# Patient Record
Sex: MALE | Race: WHITE | NOT HISPANIC OR LATINO | Employment: OTHER | URBAN - METROPOLITAN AREA
[De-identification: names, ages, dates, MRNs, and addresses within clinical notes are randomized per-mention and may not be internally consistent; named-entity substitution may affect disease eponyms.]

---

## 2018-09-05 ENCOUNTER — APPOINTMENT (OUTPATIENT)
Dept: PREADMISSION TESTING | Facility: HOSPITAL | Age: 78
End: 2018-09-05

## 2018-09-05 VITALS
TEMPERATURE: 97.2 F | HEIGHT: 67 IN | HEART RATE: 77 BPM | DIASTOLIC BLOOD PRESSURE: 70 MMHG | OXYGEN SATURATION: 96 % | SYSTOLIC BLOOD PRESSURE: 103 MMHG | WEIGHT: 201 LBS | RESPIRATION RATE: 18 BRPM | BODY MASS INDEX: 31.55 KG/M2

## 2018-09-05 LAB
ANION GAP SERPL CALCULATED.3IONS-SCNC: 12.7 MMOL/L
BILIRUB UR QL STRIP: NEGATIVE
BUN BLD-MCNC: 16 MG/DL (ref 8–23)
BUN/CREAT SERPL: 16 (ref 7–25)
CALCIUM SPEC-SCNC: 9.7 MG/DL (ref 8.6–10.5)
CHLORIDE SERPL-SCNC: 102 MMOL/L (ref 98–107)
CLARITY UR: CLEAR
CO2 SERPL-SCNC: 22.3 MMOL/L (ref 22–29)
COLOR UR: YELLOW
CREAT BLD-MCNC: 1 MG/DL (ref 0.76–1.27)
DEPRECATED RDW RBC AUTO: 45.2 FL (ref 37–54)
ERYTHROCYTE [DISTWIDTH] IN BLOOD BY AUTOMATED COUNT: 12.9 % (ref 11.5–14.5)
GFR SERPL CREATININE-BSD FRML MDRD: 72 ML/MIN/1.73
GLUCOSE BLD-MCNC: 182 MG/DL (ref 65–99)
GLUCOSE UR STRIP-MCNC: ABNORMAL MG/DL
HCT VFR BLD AUTO: 44.3 % (ref 40.4–52.2)
HGB BLD-MCNC: 14.8 G/DL (ref 13.7–17.6)
HGB UR QL STRIP.AUTO: NEGATIVE
KETONES UR QL STRIP: NEGATIVE
LEUKOCYTE ESTERASE UR QL STRIP.AUTO: NEGATIVE
MCH RBC QN AUTO: 32.1 PG (ref 27–32.7)
MCHC RBC AUTO-ENTMCNC: 33.4 G/DL (ref 32.6–36.4)
MCV RBC AUTO: 96.1 FL (ref 79.8–96.2)
NITRITE UR QL STRIP: NEGATIVE
PH UR STRIP.AUTO: 5.5 [PH] (ref 5–8)
PLATELET # BLD AUTO: 99 10*3/MM3 (ref 140–500)
PMV BLD AUTO: 11.4 FL (ref 6–12)
POTASSIUM BLD-SCNC: 4.5 MMOL/L (ref 3.5–5.2)
PROT UR QL STRIP: NEGATIVE
RBC # BLD AUTO: 4.61 10*6/MM3 (ref 4.6–6)
SODIUM BLD-SCNC: 137 MMOL/L (ref 136–145)
SP GR UR STRIP: 1.02 (ref 1–1.03)
UROBILINOGEN UR QL STRIP: ABNORMAL
WBC NRBC COR # BLD: 5.12 10*3/MM3 (ref 4.5–10.7)

## 2018-09-05 PROCEDURE — 81003 URINALYSIS AUTO W/O SCOPE: CPT | Performed by: UROLOGY

## 2018-09-05 PROCEDURE — 36415 COLL VENOUS BLD VENIPUNCTURE: CPT

## 2018-09-05 PROCEDURE — 80048 BASIC METABOLIC PNL TOTAL CA: CPT | Performed by: UROLOGY

## 2018-09-05 PROCEDURE — 93005 ELECTROCARDIOGRAM TRACING: CPT

## 2018-09-05 PROCEDURE — 93010 ELECTROCARDIOGRAM REPORT: CPT | Performed by: INTERNAL MEDICINE

## 2018-09-05 PROCEDURE — 85027 COMPLETE CBC AUTOMATED: CPT | Performed by: UROLOGY

## 2018-09-05 RX ORDER — FLUOXETINE HYDROCHLORIDE 20 MG/1
20 CAPSULE ORAL DAILY
COMMUNITY
End: 2019-05-08

## 2018-09-05 RX ORDER — NAPROXEN SODIUM 220 MG
220 TABLET ORAL 2 TIMES DAILY PRN
COMMUNITY
End: 2021-09-28

## 2018-09-05 RX ORDER — FINASTERIDE 5 MG/1
5 TABLET, FILM COATED ORAL DAILY
Status: ON HOLD | COMMUNITY
End: 2018-09-10

## 2018-09-05 RX ORDER — TAMSULOSIN HYDROCHLORIDE 0.4 MG/1
1 CAPSULE ORAL NIGHTLY
Status: ON HOLD | COMMUNITY
End: 2018-09-10

## 2018-09-05 RX ORDER — LISINOPRIL 5 MG/1
5 TABLET ORAL DAILY
COMMUNITY
End: 2022-11-01 | Stop reason: HOSPADM

## 2018-09-05 RX ORDER — LANOLIN ALCOHOL/MO/W.PET/CERES
1000 CREAM (GRAM) TOPICAL DAILY
COMMUNITY
End: 2019-05-08

## 2018-09-05 NOTE — DISCHARGE INSTRUCTIONS
Take the following medications the morning of surgery with a small sip of water:  NONE    Arrive to hospital on your day of surgery at 8:00 AM.      General Instructions:  • Do not eat or drink anything after midnight the night before surgery.  • Infants may have breast milk up to four hours before surgery.  • Infants drinking formula may drink formula up to six hours before surgery.   • Patients who avoid smoking, chewing tobacco and alcohol for 4 weeks prior to surgery have a reduced risk of post-operative complications.  Quit smoking as many days before surgery as you can.  • Do not smoke, use chewing tobacco or drink alcohol the day of surgery.   • If applicable bring your C-PAP/ BI-PAP machine.  • Bring any papers given to you in the doctor’s office.  • Wear clean comfortable clothes and socks.  • Do not wear contact lenses or make-up.  Bring a case for your glasses.   • Bring crutches or walker if applicable.  • Remove all piercings.  Leave jewelry and any other valuables at home.  • Hair extensions with metal clips must be removed prior to surgery.  • The Pre-Admission Testing nurse will instruct you to bring medications if unable to obtain an accurate list in Pre-Admission Testing.        If you were given a blood bank ID arm band remember to bring it with you the day of surgery.    Preventing a Surgical Site Infection:  • For 2 to 3 days before surgery, avoid shaving with a razor because the razor can irritate skin and make it easier to develop an infection.    • Any areas of open skin can increase the risk of a post-operative wound infection by allowing bacteria to enter and travel throughout the body.  Notify your surgeon if you have any skin wounds / rashes even if it is not near the expected surgical site.  The area will need assessed to determine if surgery should be delayed until it is healed.  • The night prior to surgery sleep in a clean bed with clean clothing.  Do not allow pets to sleep with  you.  • Shower on the morning of surgery using a fresh bar of anti-bacterial soap (such as Dial) and clean washcloth.  Dry with a clean towel and dress in clean clothing.  • Ask your surgeon if you will be receiving antibiotics prior to surgery.  • Make sure you, your family, and all healthcare providers clean their hands with soap and water or an alcohol based hand  before caring for you or your wound.    Day of surgery:  Upon arrival, a Pre-op nurse and Anesthesiologist will review your health history, obtain vital signs, and answer questions you may have.  The only belongings needed at this time will be your home medications and if applicable your C-PAP/BI-PAP machine.  If you are staying overnight your family can leave the rest of your belongings in the car and bring them to your room later.  A Pre-op nurse will start an IV and you may receive medication in preparation for surgery, including something to help you relax.  Your family will be able to see you in the Pre-op area.  While you are in surgery your family should notify the waiting room  if they leave the waiting room area and provide a contact phone number.    Please be aware that surgery does come with discomfort.  We want to make every effort to control your discomfort so please discuss any uncontrolled symptoms with your nurse.   Your doctor will most likely have prescribed pain medications.      If you are going home after surgery you will receive individualized written care instructions before being discharged.  A responsible adult must drive you to and from the hospital on the day of your surgery and stay with you for 24 hours.    If you are staying overnight following surgery, you will be transported to your hospital room following the recovery period.  [facility] has all private rooms.    You have received a list of surgical assistants for your reference.  If you have any questions please call Pre-Admission  Testing at [phone].  Deductibles and co-payments are collected on the day of service. Please be prepared to pay the required co-pay, deductible or deposit on the day of service as defined by your plan.

## 2018-09-10 ENCOUNTER — ANESTHESIA EVENT (OUTPATIENT)
Dept: PERIOP | Facility: HOSPITAL | Age: 78
End: 2018-09-10

## 2018-09-10 ENCOUNTER — ANESTHESIA (OUTPATIENT)
Dept: PERIOP | Facility: HOSPITAL | Age: 78
End: 2018-09-10

## 2018-09-10 ENCOUNTER — HOSPITAL ENCOUNTER (OUTPATIENT)
Facility: HOSPITAL | Age: 78
Discharge: HOME | End: 2018-09-12
Attending: UROLOGY | Admitting: UROLOGY

## 2018-09-10 DIAGNOSIS — N13.8 BPH WITH OBSTRUCTION/LOWER URINARY TRACT SYMPTOMS: Primary | ICD-10-CM

## 2018-09-10 DIAGNOSIS — N32.0 BLADDER OUTLET OBSTRUCTION: ICD-10-CM

## 2018-09-10 DIAGNOSIS — E11.9 TYPE 2 DIABETES MELLITUS WITHOUT COMPLICATION, WITH LONG-TERM CURRENT USE OF INSULIN (HCC): ICD-10-CM

## 2018-09-10 DIAGNOSIS — Z79.4 TYPE 2 DIABETES MELLITUS WITHOUT COMPLICATION, WITH LONG-TERM CURRENT USE OF INSULIN (HCC): ICD-10-CM

## 2018-09-10 DIAGNOSIS — N40.1 BPH WITH OBSTRUCTION/LOWER URINARY TRACT SYMPTOMS: Primary | ICD-10-CM

## 2018-09-10 DIAGNOSIS — I10 ESSENTIAL HYPERTENSION: ICD-10-CM

## 2018-09-10 PROBLEM — D69.6 THROMBOCYTOPENIA (HCC): Status: ACTIVE | Noted: 2018-09-10

## 2018-09-10 PROBLEM — E53.8 B12 DEFICIENCY: Status: ACTIVE | Noted: 2018-09-10

## 2018-09-10 LAB
GLUCOSE BLDC GLUCOMTR-MCNC: 130 MG/DL (ref 70–130)
GLUCOSE BLDC GLUCOMTR-MCNC: 161 MG/DL (ref 70–130)
GLUCOSE BLDC GLUCOMTR-MCNC: 236 MG/DL (ref 70–130)

## 2018-09-10 PROCEDURE — 25010000002 PROPOFOL 10 MG/ML EMULSION: Performed by: ANESTHESIOLOGY

## 2018-09-10 PROCEDURE — A9270 NON-COVERED ITEM OR SERVICE: HCPCS | Performed by: INTERNAL MEDICINE

## 2018-09-10 PROCEDURE — A9270 NON-COVERED ITEM OR SERVICE: HCPCS | Performed by: UROLOGY

## 2018-09-10 PROCEDURE — G0378 HOSPITAL OBSERVATION PER HR: HCPCS

## 2018-09-10 PROCEDURE — 25010000003 CEFAZOLIN IN DEXTROSE 2-4 GM/100ML-% SOLUTION: Performed by: UROLOGY

## 2018-09-10 PROCEDURE — 25010000002 ONDANSETRON PER 1 MG: Performed by: ANESTHESIOLOGY

## 2018-09-10 PROCEDURE — 88305 TISSUE EXAM BY PATHOLOGIST: CPT | Performed by: UROLOGY

## 2018-09-10 PROCEDURE — 63710000001 BELLADONNA-OPIUM 16.2-30 MG SUPPOSITORY: Performed by: UROLOGY

## 2018-09-10 PROCEDURE — 63710000001 INSULIN ASPART PER 5 UNITS: Performed by: INTERNAL MEDICINE

## 2018-09-10 PROCEDURE — 25010000002 MIDAZOLAM PER 1 MG: Performed by: ANESTHESIOLOGY

## 2018-09-10 PROCEDURE — 25010000002 FENTANYL CITRATE (PF) 100 MCG/2ML SOLUTION: Performed by: ANESTHESIOLOGY

## 2018-09-10 PROCEDURE — 63710000001 SENNOSIDES-DOCUSATE SODIUM 8.6-50 MG TABLET: Performed by: UROLOGY

## 2018-09-10 PROCEDURE — 82962 GLUCOSE BLOOD TEST: CPT

## 2018-09-10 RX ORDER — ONDANSETRON 4 MG/1
4 TABLET, ORALLY DISINTEGRATING ORAL EVERY 6 HOURS PRN
Status: DISCONTINUED | OUTPATIENT
Start: 2018-09-10 | End: 2018-09-12 | Stop reason: HOSPADM

## 2018-09-10 RX ORDER — CEFAZOLIN SODIUM 2 G/100ML
2 INJECTION, SOLUTION INTRAVENOUS ONCE
Status: COMPLETED | OUTPATIENT
Start: 2018-09-10 | End: 2018-09-10

## 2018-09-10 RX ORDER — FENTANYL CITRATE 50 UG/ML
INJECTION, SOLUTION INTRAMUSCULAR; INTRAVENOUS AS NEEDED
Status: DISCONTINUED | OUTPATIENT
Start: 2018-09-10 | End: 2018-09-10 | Stop reason: SURG

## 2018-09-10 RX ORDER — NALOXONE HCL 0.4 MG/ML
0.2 VIAL (ML) INJECTION AS NEEDED
Status: DISCONTINUED | OUTPATIENT
Start: 2018-09-10 | End: 2018-09-10 | Stop reason: HOSPADM

## 2018-09-10 RX ORDER — FLUMAZENIL 0.1 MG/ML
0.2 INJECTION INTRAVENOUS AS NEEDED
Status: DISCONTINUED | OUTPATIENT
Start: 2018-09-10 | End: 2018-09-10 | Stop reason: HOSPADM

## 2018-09-10 RX ORDER — OXYCODONE AND ACETAMINOPHEN 7.5; 325 MG/1; MG/1
1 TABLET ORAL ONCE AS NEEDED
Status: DISCONTINUED | OUTPATIENT
Start: 2018-09-10 | End: 2018-09-10 | Stop reason: HOSPADM

## 2018-09-10 RX ORDER — ATROPA BELLADONNA AND OPIUM 16.2; 6 MG/1; MG/1
30 SUPPOSITORY RECTAL DAILY PRN
Status: CANCELLED | OUTPATIENT
Start: 2018-09-10 | End: 2018-09-20

## 2018-09-10 RX ORDER — MIDAZOLAM HYDROCHLORIDE 1 MG/ML
1 INJECTION INTRAMUSCULAR; INTRAVENOUS
Status: DISCONTINUED | OUTPATIENT
Start: 2018-09-10 | End: 2018-09-10 | Stop reason: HOSPADM

## 2018-09-10 RX ORDER — SODIUM CHLORIDE 9 MG/ML
75 INJECTION, SOLUTION INTRAVENOUS CONTINUOUS
Status: DISCONTINUED | OUTPATIENT
Start: 2018-09-10 | End: 2018-09-12 | Stop reason: HOSPADM

## 2018-09-10 RX ORDER — PROPOFOL 10 MG/ML
VIAL (ML) INTRAVENOUS AS NEEDED
Status: DISCONTINUED | OUTPATIENT
Start: 2018-09-10 | End: 2018-09-10 | Stop reason: SURG

## 2018-09-10 RX ORDER — PROMETHAZINE HYDROCHLORIDE 25 MG/1
12.5 TABLET ORAL ONCE AS NEEDED
Status: DISCONTINUED | OUTPATIENT
Start: 2018-09-10 | End: 2018-09-10 | Stop reason: HOSPADM

## 2018-09-10 RX ORDER — ONDANSETRON 2 MG/ML
4 INJECTION INTRAMUSCULAR; INTRAVENOUS ONCE AS NEEDED
Status: DISCONTINUED | OUTPATIENT
Start: 2018-09-10 | End: 2018-09-10 | Stop reason: HOSPADM

## 2018-09-10 RX ORDER — ONDANSETRON 2 MG/ML
4 INJECTION INTRAMUSCULAR; INTRAVENOUS EVERY 6 HOURS PRN
Status: DISCONTINUED | OUTPATIENT
Start: 2018-09-10 | End: 2018-09-12 | Stop reason: HOSPADM

## 2018-09-10 RX ORDER — EPHEDRINE SULFATE 50 MG/ML
5 INJECTION, SOLUTION INTRAVENOUS ONCE AS NEEDED
Status: DISCONTINUED | OUTPATIENT
Start: 2018-09-10 | End: 2018-09-10 | Stop reason: HOSPADM

## 2018-09-10 RX ORDER — SODIUM CHLORIDE 0.9 % (FLUSH) 0.9 %
1-10 SYRINGE (ML) INJECTION AS NEEDED
Status: DISCONTINUED | OUTPATIENT
Start: 2018-09-10 | End: 2018-09-10 | Stop reason: HOSPADM

## 2018-09-10 RX ORDER — DIPHENHYDRAMINE HYDROCHLORIDE 50 MG/ML
12.5 INJECTION INTRAMUSCULAR; INTRAVENOUS
Status: DISCONTINUED | OUTPATIENT
Start: 2018-09-10 | End: 2018-09-10 | Stop reason: HOSPADM

## 2018-09-10 RX ORDER — DEXTROSE MONOHYDRATE 25 G/50ML
25 INJECTION, SOLUTION INTRAVENOUS
Status: DISCONTINUED | OUTPATIENT
Start: 2018-09-10 | End: 2018-09-12 | Stop reason: HOSPADM

## 2018-09-10 RX ORDER — LABETALOL HYDROCHLORIDE 5 MG/ML
5 INJECTION, SOLUTION INTRAVENOUS
Status: DISCONTINUED | OUTPATIENT
Start: 2018-09-10 | End: 2018-09-10 | Stop reason: HOSPADM

## 2018-09-10 RX ORDER — ONDANSETRON 4 MG/1
4 TABLET, FILM COATED ORAL EVERY 6 HOURS PRN
Status: DISCONTINUED | OUTPATIENT
Start: 2018-09-10 | End: 2018-09-12 | Stop reason: HOSPADM

## 2018-09-10 RX ORDER — HYDROCODONE BITARTRATE AND ACETAMINOPHEN 7.5; 325 MG/1; MG/1
1 TABLET ORAL ONCE AS NEEDED
Status: DISCONTINUED | OUTPATIENT
Start: 2018-09-10 | End: 2018-09-10 | Stop reason: HOSPADM

## 2018-09-10 RX ORDER — MIDAZOLAM HYDROCHLORIDE 1 MG/ML
2 INJECTION INTRAMUSCULAR; INTRAVENOUS
Status: DISCONTINUED | OUTPATIENT
Start: 2018-09-10 | End: 2018-09-10 | Stop reason: HOSPADM

## 2018-09-10 RX ORDER — PROMETHAZINE HYDROCHLORIDE 25 MG/ML
12.5 INJECTION, SOLUTION INTRAMUSCULAR; INTRAVENOUS ONCE AS NEEDED
Status: DISCONTINUED | OUTPATIENT
Start: 2018-09-10 | End: 2018-09-10 | Stop reason: HOSPADM

## 2018-09-10 RX ORDER — OXYCODONE AND ACETAMINOPHEN 7.5; 325 MG/1; MG/1
2 TABLET ORAL EVERY 4 HOURS PRN
Status: CANCELLED | OUTPATIENT
Start: 2018-09-10 | End: 2018-09-20

## 2018-09-10 RX ORDER — FAMOTIDINE 10 MG/ML
20 INJECTION, SOLUTION INTRAVENOUS ONCE
Status: COMPLETED | OUTPATIENT
Start: 2018-09-10 | End: 2018-09-10

## 2018-09-10 RX ORDER — MAGNESIUM HYDROXIDE 1200 MG/15ML
LIQUID ORAL AS NEEDED
Status: DISCONTINUED | OUTPATIENT
Start: 2018-09-10 | End: 2018-09-10 | Stop reason: HOSPADM

## 2018-09-10 RX ORDER — SENNA AND DOCUSATE SODIUM 50; 8.6 MG/1; MG/1
2 TABLET, FILM COATED ORAL NIGHTLY
Status: DISCONTINUED | OUTPATIENT
Start: 2018-09-10 | End: 2018-09-12 | Stop reason: HOSPADM

## 2018-09-10 RX ORDER — FENTANYL CITRATE 50 UG/ML
50 INJECTION, SOLUTION INTRAMUSCULAR; INTRAVENOUS
Status: DISCONTINUED | OUTPATIENT
Start: 2018-09-10 | End: 2018-09-10 | Stop reason: HOSPADM

## 2018-09-10 RX ORDER — LISINOPRIL 5 MG/1
5 TABLET ORAL DAILY
Status: DISCONTINUED | OUTPATIENT
Start: 2018-09-10 | End: 2018-09-10

## 2018-09-10 RX ORDER — SODIUM CHLORIDE, SODIUM LACTATE, POTASSIUM CHLORIDE, CALCIUM CHLORIDE 600; 310; 30; 20 MG/100ML; MG/100ML; MG/100ML; MG/100ML
9 INJECTION, SOLUTION INTRAVENOUS CONTINUOUS
Status: DISCONTINUED | OUTPATIENT
Start: 2018-09-10 | End: 2018-09-10

## 2018-09-10 RX ORDER — PROMETHAZINE HYDROCHLORIDE 25 MG/1
25 TABLET ORAL ONCE AS NEEDED
Status: DISCONTINUED | OUTPATIENT
Start: 2018-09-10 | End: 2018-09-10 | Stop reason: HOSPADM

## 2018-09-10 RX ORDER — CEFAZOLIN SODIUM 2 G/100ML
2 INJECTION, SOLUTION INTRAVENOUS EVERY 8 HOURS
Status: COMPLETED | OUTPATIENT
Start: 2018-09-10 | End: 2018-09-11

## 2018-09-10 RX ORDER — LIDOCAINE HYDROCHLORIDE 20 MG/ML
INJECTION, SOLUTION INFILTRATION; PERINEURAL AS NEEDED
Status: DISCONTINUED | OUTPATIENT
Start: 2018-09-10 | End: 2018-09-10 | Stop reason: SURG

## 2018-09-10 RX ORDER — NICOTINE POLACRILEX 4 MG
15 LOZENGE BUCCAL
Status: DISCONTINUED | OUTPATIENT
Start: 2018-09-10 | End: 2018-09-12 | Stop reason: HOSPADM

## 2018-09-10 RX ORDER — PROMETHAZINE HYDROCHLORIDE 25 MG/1
25 SUPPOSITORY RECTAL ONCE AS NEEDED
Status: DISCONTINUED | OUTPATIENT
Start: 2018-09-10 | End: 2018-09-10 | Stop reason: HOSPADM

## 2018-09-10 RX ORDER — ONDANSETRON 2 MG/ML
INJECTION INTRAMUSCULAR; INTRAVENOUS AS NEEDED
Status: DISCONTINUED | OUTPATIENT
Start: 2018-09-10 | End: 2018-09-10 | Stop reason: SURG

## 2018-09-10 RX ORDER — FLUOXETINE HYDROCHLORIDE 20 MG/1
20 CAPSULE ORAL DAILY
Status: DISCONTINUED | OUTPATIENT
Start: 2018-09-10 | End: 2018-09-12 | Stop reason: HOSPADM

## 2018-09-10 RX ADMIN — INSULIN ASPART 4 UNITS: 100 INJECTION, SOLUTION INTRAVENOUS; SUBCUTANEOUS at 22:07

## 2018-09-10 RX ADMIN — SODIUM CHLORIDE 75 ML/HR: 9 INJECTION, SOLUTION INTRAVENOUS at 17:46

## 2018-09-10 RX ADMIN — FENTANYL CITRATE 25 MCG: 50 INJECTION INTRAMUSCULAR; INTRAVENOUS at 12:06

## 2018-09-10 RX ADMIN — EPHEDRINE SULFATE 10 MG: 50 INJECTION INTRAMUSCULAR; INTRAVENOUS; SUBCUTANEOUS at 12:27

## 2018-09-10 RX ADMIN — CEFAZOLIN SODIUM 2 G: 2 INJECTION, SOLUTION INTRAVENOUS at 22:07

## 2018-09-10 RX ADMIN — CEFAZOLIN SODIUM 2 G: 2 INJECTION, SOLUTION INTRAVENOUS at 12:09

## 2018-09-10 RX ADMIN — DOCUSATE SODIUM -SENNOSIDES 2 TABLET: 50; 8.6 TABLET, COATED ORAL at 22:07

## 2018-09-10 RX ADMIN — FENTANYL CITRATE 25 MCG: 50 INJECTION INTRAMUSCULAR; INTRAVENOUS at 12:32

## 2018-09-10 RX ADMIN — PROPOFOL 80 MG: 10 INJECTION, EMULSION INTRAVENOUS at 12:06

## 2018-09-10 RX ADMIN — ONDANSETRON 4 MG: 2 INJECTION INTRAMUSCULAR; INTRAVENOUS at 12:06

## 2018-09-10 RX ADMIN — FENTANYL CITRATE 25 MCG: 50 INJECTION INTRAMUSCULAR; INTRAVENOUS at 12:05

## 2018-09-10 RX ADMIN — FAMOTIDINE 20 MG: 10 INJECTION, SOLUTION INTRAVENOUS at 08:58

## 2018-09-10 RX ADMIN — EPHEDRINE SULFATE 10 MG: 50 INJECTION INTRAMUSCULAR; INTRAVENOUS; SUBCUTANEOUS at 12:29

## 2018-09-10 RX ADMIN — SODIUM CHLORIDE, POTASSIUM CHLORIDE, SODIUM LACTATE AND CALCIUM CHLORIDE 9 ML/HR: 600; 310; 30; 20 INJECTION, SOLUTION INTRAVENOUS at 08:57

## 2018-09-10 RX ADMIN — ATROPA BELLADONNA AND OPIUM 30 MG: 16.2; 3 SUPPOSITORY RECTAL at 22:07

## 2018-09-10 RX ADMIN — FENTANYL CITRATE 25 MCG: 50 INJECTION INTRAMUSCULAR; INTRAVENOUS at 12:34

## 2018-09-10 RX ADMIN — MIDAZOLAM HYDROCHLORIDE 0.5 MG: 2 INJECTION, SOLUTION INTRAMUSCULAR; INTRAVENOUS at 08:57

## 2018-09-10 RX ADMIN — SODIUM CHLORIDE 75 ML/HR: 9 INJECTION, SOLUTION INTRAVENOUS at 22:17

## 2018-09-10 RX ADMIN — LIDOCAINE HYDROCHLORIDE 40 MG: 20 INJECTION, SOLUTION INFILTRATION; PERINEURAL at 12:06

## 2018-09-10 NOTE — ANESTHESIA POSTPROCEDURE EVALUATION
"Patient: Teto Castle    Procedure Summary     Date:  09/10/18 Room / Location:  [facility] OR 01 / [facility]    Anesthesia Start:  1202 Anesthesia Stop:  1257    Procedure:  TRANSURETHRAL RESECTION OF PROSTATE (N/A ) Diagnosis:       BPH with obstruction/lower urinary tract symptoms      (BPH with GIRON)    Surgeon:  Art Dickerson MD Provider:  Riley Fair MD    Anesthesia Type:  general ASA Status:  3          Anesthesia Type: general  Last vitals  BP   119/78 (09/10/18 1315)   Temp   36.7 °C (98.1 °F) (09/10/18 1255)   Pulse   77 (09/10/18 1315)   Resp   12 (09/10/18 1315)     SpO2   95 % (09/10/18 1315)     Post Anesthesia Care and Evaluation    Patient location during evaluation: PACU  Patient participation: complete - patient participated  Level of consciousness: awake and alert  Pain score: 0  Pain management: adequate  Airway patency: patent  Anesthetic complications: No anesthetic complications    Cardiovascular status: acceptable  Respiratory status: acceptable  Hydration status: acceptable    Comments: /78   Pulse 77   Temp 36.7 °C (98.1 °F) (Oral)   Resp 12   Ht 170.2 cm (67.01\")   Wt 90.8 kg (200 lb 3 oz)   SpO2 95%   BMI 31.35 kg/m²       "

## 2018-09-10 NOTE — OP NOTE
CYSTOSCOPY TRANSURETHRAL RESECTION OF PROSTATE  Procedure Note    Teto Castle  9/10/2018    Pre-op Diagnosis:   BPH with GIRON    Post-op Diagnosis:     Post-Op Diagnosis Codes:     * BPH with obstruction/lower urinary tract symptoms [N40.1, N13.8]    Procedure(s):  TRANSURETHRAL RESECTION OF PROSTATE    Surgeon(s):  Art Dickerson MD    Anesthesia: General    Staff:   Circulator: Jana Conner RN; Marilee Shin RN  Scrub Person: Jocy Jerez; Chrissy León    Estimated Blood Loss: 100ml    Specimens:                  Order Name Source Comment Collection Info Order Time   TISSUE PATHOLOGY EXAM Prostate  Collected By: Art Dickerson MD 9/10/2018 12:43 PM         Drains:   Urethral Catheter Other (Comment) 24 Fr. (Active)       Findings: trilobar hyperplasia with small median lobe, nl orifices    Complications: none    Indications: 77yo CM with GIRON failing meds and having recurrent UTIs.    Procedure: Patient was taken to operative suite and given general anesthesia. Placed in lithotomy and prepped and draped. Resectoscope placed. Trilobar disease noted. Orifices were normal and away from bladder neck. Prostate resected with A[facility] Gyrus scoep in a [place] fashion. Hemostasis achieved and chips irrigate clear. Prostate now side open. Honey 24f 3way placed to continuous irrigation.      Art Dickerson MD     Date: 9/10/2018  Time: 1:02 PM

## 2018-09-10 NOTE — CONSULTS
Patient Name:  Teto Castle  YOB: 1940  MRN:  [id number]  Admit Date:  9/10/2018  Patient Care Team:  Cam Dickey MD as PCP - General (Family Medicine)    Referring Provider: Dr. Dickerson  Reason for Consult: Diabetes Management    Subjective   Mr. Castle is a 78 y.o. male with a history of BPH, HTN, and Type 2 DM admitted to [facility] by the urology service for a TURP, which was done earlier today without issues.  He has no complaints at this time.  He denies nausea, vomiting, and shortness of breath.  His pain is well-controlled.  I was consulted for management of his diabetes.  He takes Tresiba insulin, metformin, and Januvia at home.  He states that for at least the past month his BG levels have been from about 115-140.  He denies hypoglycemic episodes.  He does not recall his A1C.  He additionally takes lisinopril to control his blood pressure and he has not had issues with this.        History of Present Illness    Past Medical History:   Diagnosis Date   • Anxiety and depression    • Arthritis    • BPH (benign prostatic hyperplasia)    • Diabetes mellitus (CMS/HCC)    • Hard of hearing    • History of frequent urinary tract infections    • History of MI (myocardial infarction)     STATES WAS TOLD HE HAD IN PAST, NEVER SAW CARDIOLOGY   • Hypertension      Past Surgical History:   Procedure Laterality Date   • CATARACT EXTRACTION, BILATERAL     • FRACTURE SURGERY      LT ARM   • HEMORRHOIDECTOMY       Family History   Problem Relation Age of Onset   • Malig Hyperthermia Neg Hx      Social History   Substance Use Topics   • Smoking status: Former Smoker     Types: Cigars   • Smokeless tobacco: Current User     Types: Chew      Comment: 25 YEARS AGO   • Alcohol use No     Prescriptions Prior to Admission   Medication Sig Dispense Refill Last Dose   • Cariprazine HCl (VRAYLAR) 1.5 MG capsule capsule Take 1.5 mg by mouth Every Evening.   9/9/2018 at 2000   • FLUoxetine  (PROzac) 20 MG capsule Take 20 mg by mouth Daily.   9/9/2018 at 2000   • Insulin Degludec (TRESIBA FLEXTOUCH SC) Inject 12-15 Units under the skin into the appropriate area as directed Daily.   9/9/2018 at 1000   • lisinopril (PRINIVIL,ZESTRIL) 5 MG tablet Take 5 mg by mouth Daily.   9/10/2018 at 0530   • metFORMIN (GLUCOPHAGE) 500 MG tablet Take 500 mg by mouth 2 (Two) Times a Day With Meals.   9/10/2018 at 0530   • Multiple Vitamins-Minerals (MULTIVITAMIN ADULT PO) Take 1 tablet by mouth Every Evening.   2 WEEKS   • naproxen sodium (ALEVE) 220 MG tablet Take 220 mg by mouth 2 (Two) Times a Day As Needed. PT TO HOLD FOR SURGERY   2 WEEKS   • SITagliptin (JANUVIA) 100 MG tablet Take 100 mg by mouth Daily.   9/10/2018 at 0530   • vitamin B-12 (CYANOCOBALAMIN) 1000 MCG tablet Take 1,000 mcg by mouth Daily.   2 WEEKS at Unknown time   • VITAMIN E PO Take 1 tablet by mouth Every Other Day. PT TO HOLD FOR SURGERY   2 WEEKS     Allergies:    Allergies   Allergen Reactions   • Morphine Itching and Confusion       Review of Systems   Constitutional: Negative for chills and fever.   HENT: Negative for congestion, nosebleeds, sore throat and trouble swallowing.    Eyes: Negative for redness and visual disturbance.   Respiratory: Negative for cough, choking, chest tightness and shortness of breath.    Cardiovascular: Negative for chest pain, palpitations and leg swelling.   Gastrointestinal: Negative for abdominal pain, blood in stool, constipation, diarrhea, nausea and vomiting.   Endocrine: Negative for cold intolerance and heat intolerance.   Genitourinary: Positive for difficulty urinating and hematuria.   Musculoskeletal: Negative for arthralgias and myalgias.   Skin: Negative for pallor and rash.   Neurological: Negative for dizziness, weakness, light-headedness and headaches.   Hematological: Negative for adenopathy. Does not bruise/bleed easily.   Psychiatric/Behavioral: Negative for confusion and decreased  concentration.        Objective    Vital Signs  Temp:  [97.2 °F (36.2 °C)-98.3 °F (36.8 °C)] 97.2 °F (36.2 °C)  Heart Rate:  [64-91] 82  Resp:  [12-18] 18  BP: (100-135)/() 115/73  SpO2:  [94 %-99 %] 95 %  on  Flow (L/min):  [4] 4;   Device (Oxygen Therapy): room air  Body mass index is 31.35 kg/m².    Physical Exam   Constitutional: He is oriented to person, place, and time. No distress.   HENT:   Head: Normocephalic and atraumatic.   Mouth/Throat: Oropharynx is clear and moist.   Eyes: Pupils are equal, round, and reactive to light. Conjunctivae and EOM are normal.   Neck: Normal range of motion. Neck supple.   Cardiovascular: Normal rate, regular rhythm and intact distal pulses.    Pulmonary/Chest: Effort normal and breath sounds normal. He has no rales.   Abdominal: Soft. Bowel sounds are normal.   Genitourinary:   Genitourinary Comments: Yip catheter with irrigation in place draining punch-colored   Musculoskeletal: He exhibits no edema or tenderness.   Neurological: He is alert and oriented to person, place, and time. He has normal strength. No cranial nerve deficit.   Skin: Skin is warm and dry. He is not diaphoretic.   Psychiatric: He has a normal mood and affect. His behavior is normal.   Nursing note and vitals reviewed.      Results Review:  I reviewed the patient's new clinical results.  I reviewed the patient's other test results and agree with the interpretation  I personally viewed and interpreted the patient's EKG/Telemetry data (from 9/5/18)    Lab Results (last 24 hours)     Procedure Component Value Units Date/Time    POC Glucose Once [[id number]]  (Normal) Collected:  09/10/18 0815    Specimen:  Blood Updated:  09/10/18 0816     Glucose 130 mg/dL     Narrative:       Meter: [id number] : [id number] Eduin GOMEZ    Tissue Pathology Exam [[id number]] Collected:  09/10/18 1227    Specimen:  Tissue from Prostate Updated:  09/10/18 1406    POC Glucose Once [[id number]]  (Abnormal)  Collected:  09/10/18 1724    Specimen:  Blood Updated:  09/10/18 1730     Glucose 161 (H) mg/dL     Narrative:       Meter: [id number] : [id number] Conrad GOMEZ          No orders to display     Assessment/Plan   Active Hospital Problems    Diagnosis Date Noted   • **BPH with obstruction/lower urinary tract symptoms [N40.1, N13.8] 09/10/2018   • Essential hypertension [I10] 09/10/2018   • Type 2 diabetes mellitus without complication (CMS/[facility]) [E11.9] 09/10/2018   • Thrombocytopenia (CMS/[facility]) [D69.6] 09/10/2018   • B12 deficiency [E53.8] 09/10/2018      Resolved Hospital Problems    Diagnosis Date Noted Date Resolved   No resolved problems to display.   BPH with Obstruction/LUTS  - s/p TURP on 9/10/18  - management per primary    Type 2 DM  - BG acceptable  - on 12 units of Tresiba QAM at home-will order 10 units of levemir for tomorrow morning  - cover with ssi  - hold metformin  - can restart januvia (substitute Tradjenta per formulary)    Hypertension  - BP is on the low end of normal  - hold lisinopril for now to avoid hypotension  - BMP in AM    Thrombocytopenia  - mild, patient states he was unaware of this diagnosis and no recent labs in our system  - CBC ordered for the AM, will follow    B12 Deficiency  - resume home dose of B12 at discharge    DVT Prophylaxis  - SCDs    Thank you very much for this consult.  [facility] will continue to follow the patient with you.    I discussed the patients findings and my recommendations with patient and nursing staff.      Jonas Antonio MD  [facility]  09/10/18  7:13 PM

## 2018-09-10 NOTE — H&P
First Urology History and Physical    Patient Care Team:  Cam Dickey MD as PCP - General (Family Medicine)    Chief complaint bladder outlet obstruction    Subjective     Patient is a 78 y.o. male presents with severe progressive LUTS and recurrent UTIs. He is already maximized on medications with flomax and proscar. Onset of symptoms was gradual starting several years ago.   Associated symptoms include obstructive and irritative LUTS.      Review of Systems   Pertinent items are noted in HPI    Past Medical History:   Diagnosis Date   • Anxiety and depression    • Arthritis    • BPH (benign prostatic hyperplasia)    • Diabetes mellitus (CMS/HCC)    • Hard of hearing    • History of frequent urinary tract infections    • History of MI (myocardial infarction)     STATES WAS TOLD HE HAD IN PAST, NEVER SAW CARDIOLOGY   • Hypertension      Past Surgical History:   Procedure Laterality Date   • CATARACT EXTRACTION, BILATERAL     • FRACTURE SURGERY      LT ARM   • HEMORRHOIDECTOMY       Family History   Problem Relation Age of Onset   • Malig Hyperthermia Neg Hx      Social History   Substance Use Topics   • Smoking status: Former Smoker     Types: Cigars   • Smokeless tobacco: Current User     Types: Chew      Comment: 25 YEARS AGO   • Alcohol use No     Prescriptions Prior to Admission   Medication Sig Dispense Refill Last Dose   • Cariprazine HCl (VRAYLAR) 1.5 MG capsule capsule Take 1.5 mg by mouth Every Evening.      • finasteride (PROSCAR) 5 MG tablet Take 5 mg by mouth Daily.      • FLUoxetine (PROzac) 20 MG capsule Take 20 mg by mouth Daily.      • Insulin Degludec (TRESIBA FLEXTOUCH SC) Inject 12-15 Units under the skin into the appropriate area as directed Daily.      • lisinopril (PRINIVIL,ZESTRIL) 5 MG tablet Take 5 mg by mouth Daily.      • metFORMIN (GLUCOPHAGE) 500 MG tablet Take 500 mg by mouth 2 (Two) Times a Day With Meals.      • Multiple Vitamins-Minerals (MULTIVITAMIN ADULT PO) Take 1  "tablet by mouth Every Evening.      • naproxen sodium (ALEVE) 220 MG tablet Take 220 mg by mouth 2 (Two) Times a Day As Needed. PT TO HOLD FOR SURGERY      • SITagliptin (JANUVIA) 100 MG tablet Take 100 mg by mouth Daily.      • tamsulosin (FLOMAX) 0.4 MG capsule 24 hr capsule Take 1 capsule by mouth Every Night.      • vitamin B-12 (CYANOCOBALAMIN) 1000 MCG tablet Take 1,000 mcg by mouth Daily.      • VITAMIN E PO Take 1 tablet by mouth Every Other Day. PT TO HOLD FOR SURGERY        Allergies:  Morphine    Objective     Vital Signs  Temp:  [97.6 °F (36.4 °C)] 97.6 °F (36.4 °C)  Heart Rate:  [64] 64  Resp:  [18] 18  BP: (122)/(79) 122/79  No intake or output data in the 24 hours ending 09/10/18 0849  Flowsheet Rows      First Filed Value   Admission Height  170.2 cm (67.01\") Documented at 09/10/2018 0838   Admission Weight  90.8 kg (200 lb 3 oz) Documented at 09/10/2018 0838           Physical Exam:      General Appearance:    Alert, cooperative, in no acute distress   Head:    Normocephalic, without obvious abnormality, atraumatic   Eyes:            Lids and lashes normal, conjunctivae and sclerae normal, no   icterus, no pallor, corneas clear, PERRLA   Ears:    Ears appear intact with no abnormalities noted   Throat:   No oral lesions, no thrush, oral mucosa moist   Neck:   No adenopathy, supple, trachea midline, no thyromegaly, no   carotid bruit, no JVD   Back:     No kyphosis present, no scoliosis present, no skin lesions,      erythema or scars, no tenderness to percussion or                   palpation,   range of motion normal   Lungs:     Clear to auscultation,respirations regular, even and                  unlabored    Heart:    Regular rhythm and normal rate, normal S1 and S2, no            murmur, no gallop, no rub, no click   Chest Wall:    No abnormalities observed   Abdomen:     Normal bowel sounds, no masses, no organomegaly, soft        non-tender, non-distended, no guarding, no rebound              "   tenderness   Rectal:     Deferred   Extremities:   Moves all extremities well, no edema, no cyanosis, no             redness   Pulses:   Pulses palpable and equal bilaterally   Skin:   No bleeding, bruising or rash   Lymph nodes:   No palpable adenopathy   Neurologic:   Cranial nerves 2 - 12 grossly intact, sensation intact, DTR       present and equal bilaterally     Results Review:    I reviewed the patient's new clinical results.  Results for orders placed or performed during the hospital encounter of 09/10/18   POC Glucose Once   Result Value Ref Range    Glucose 130 70 - 130 mg/dL        Assessment/Plan:  Assessment/Plan     Active Problems:    BPH with obstruction/lower urinary tract symptoms      Plan- TURP    I discussed the patients findings and my recommendations with patient.     Art Dickerson MD  09/10/18  8:49 AM

## 2018-09-10 NOTE — ANESTHESIA PROCEDURE NOTES
Airway  Airway not difficult    General Information and Staff    Anesthesiologist: JOSY BLAKELY    Indications and Patient Condition    Preoxygenated: yes      Final Airway Details  Final airway type: supraglottic airway      Successful airway: unique  Size 5

## 2018-09-10 NOTE — ANESTHESIA PREPROCEDURE EVALUATION
Anesthesia Evaluation     no history of anesthetic complications:               Airway   Mallampati: I  TM distance: >3 FB  Small opening  Dental    (+) edentulous    Pulmonary    (+) a smoker Former,   (-) asthma, recent URI    ROS comment: Chews tobacco  Cardiovascular     (+) hypertension,   (-) dysrhythmias, angina    ROS comment: NSR    Neuro/Psych  GI/Hepatic/Renal/Endo    (+)   diabetes mellitus,   (-) GERD    Musculoskeletal     Abdominal    Substance History      OB/GYN          Other                        Anesthesia Plan    ASA 3     general     intravenous induction   Anesthetic plan, all risks, benefits, and alternatives have been provided, discussed and informed consent has been obtained with: patient.

## 2018-09-10 NOTE — PLAN OF CARE
Problem: Patient Care Overview  Goal: Plan of Care Review  Outcome: Ongoing (interventions implemented as appropriate)   09/10/18 3002   Coping/Psychosocial   Plan of Care Reviewed With patient;family   Plan of Care Review   Progress no change   OTHER   Outcome Summary Yip patent with CBI. Urine clear pink. No c/o pain or nausea. VSS.      Goal: Individualization and Mutuality  Outcome: Ongoing (interventions implemented as appropriate)      Problem: Fall Risk (Adult)  Goal: Identify Related Risk Factors and Signs and Symptoms  Outcome: Ongoing (interventions implemented as appropriate)    Goal: Absence of Fall  Outcome: Ongoing (interventions implemented as appropriate)      Problem: Urine Elimination Impaired (Adult)  Goal: Effective Containment of Urine  Outcome: Ongoing (interventions implemented as appropriate)

## 2018-09-11 LAB
ANION GAP SERPL CALCULATED.3IONS-SCNC: 10.5 MMOL/L
BUN BLD-MCNC: 14 MG/DL (ref 8–23)
BUN/CREAT SERPL: 13.6 (ref 7–25)
CALCIUM SPEC-SCNC: 8.7 MG/DL (ref 8.6–10.5)
CHLORIDE SERPL-SCNC: 98 MMOL/L (ref 98–107)
CO2 SERPL-SCNC: 26.5 MMOL/L (ref 22–29)
CREAT BLD-MCNC: 1.03 MG/DL (ref 0.76–1.27)
CYTO UR: NORMAL
DEPRECATED RDW RBC AUTO: 45.2 FL (ref 37–54)
ERYTHROCYTE [DISTWIDTH] IN BLOOD BY AUTOMATED COUNT: 13.1 % (ref 11.5–14.5)
GFR SERPL CREATININE-BSD FRML MDRD: 70 ML/MIN/1.73
GLUCOSE BLD-MCNC: 175 MG/DL (ref 65–99)
GLUCOSE BLDC GLUCOMTR-MCNC: 129 MG/DL (ref 70–130)
GLUCOSE BLDC GLUCOMTR-MCNC: 149 MG/DL (ref 70–130)
GLUCOSE BLDC GLUCOMTR-MCNC: 172 MG/DL (ref 70–130)
GLUCOSE BLDC GLUCOMTR-MCNC: 246 MG/DL (ref 70–130)
HCT VFR BLD AUTO: 39.4 % (ref 40.4–52.2)
HGB BLD-MCNC: 13.4 G/DL (ref 13.7–17.6)
LAB AP CASE REPORT: NORMAL
MCH RBC QN AUTO: 32.4 PG (ref 27–32.7)
MCHC RBC AUTO-ENTMCNC: 34 G/DL (ref 32.6–36.4)
MCV RBC AUTO: 95.2 FL (ref 79.8–96.2)
PATH REPORT.FINAL DX SPEC: NORMAL
PATH REPORT.GROSS SPEC: NORMAL
PLATELET # BLD AUTO: 74 10*3/MM3 (ref 140–500)
PMV BLD AUTO: 11.1 FL (ref 6–12)
POTASSIUM BLD-SCNC: 3.9 MMOL/L (ref 3.5–5.2)
RBC # BLD AUTO: 4.14 10*6/MM3 (ref 4.6–6)
SODIUM BLD-SCNC: 135 MMOL/L (ref 136–145)
WBC NRBC COR # BLD: 4.99 10*3/MM3 (ref 4.5–10.7)

## 2018-09-11 PROCEDURE — 63710000001 CARIPRAZINE HCL 1.5 MG CAPSULE: Performed by: UROLOGY

## 2018-09-11 PROCEDURE — A9270 NON-COVERED ITEM OR SERVICE: HCPCS | Performed by: INTERNAL MEDICINE

## 2018-09-11 PROCEDURE — 85027 COMPLETE CBC AUTOMATED: CPT | Performed by: UROLOGY

## 2018-09-11 PROCEDURE — 80048 BASIC METABOLIC PNL TOTAL CA: CPT | Performed by: UROLOGY

## 2018-09-11 PROCEDURE — A9270 NON-COVERED ITEM OR SERVICE: HCPCS | Performed by: UROLOGY

## 2018-09-11 PROCEDURE — G0378 HOSPITAL OBSERVATION PER HR: HCPCS

## 2018-09-11 PROCEDURE — 63710000001 FLUOXETINE 20 MG CAPSULE: Performed by: UROLOGY

## 2018-09-11 PROCEDURE — 63710000001 LINAGLIPTIN 5 MG TABLET: Performed by: INTERNAL MEDICINE

## 2018-09-11 PROCEDURE — 25010000003 CEFAZOLIN IN DEXTROSE 2-4 GM/100ML-% SOLUTION: Performed by: UROLOGY

## 2018-09-11 PROCEDURE — 63710000001 INSULIN ASPART PER 5 UNITS: Performed by: INTERNAL MEDICINE

## 2018-09-11 PROCEDURE — 82962 GLUCOSE BLOOD TEST: CPT

## 2018-09-11 PROCEDURE — 63710000001 SENNOSIDES-DOCUSATE SODIUM 8.6-50 MG TABLET: Performed by: UROLOGY

## 2018-09-11 PROCEDURE — 63710000001 INSULIN DETEMIR PER 5 UNITS: Performed by: INTERNAL MEDICINE

## 2018-09-11 RX ADMIN — DOCUSATE SODIUM -SENNOSIDES 2 TABLET: 50; 8.6 TABLET, COATED ORAL at 21:04

## 2018-09-11 RX ADMIN — LINAGLIPTIN 5 MG: 5 TABLET, FILM COATED ORAL at 08:18

## 2018-09-11 RX ADMIN — INSULIN ASPART 2 UNITS: 100 INJECTION, SOLUTION INTRAVENOUS; SUBCUTANEOUS at 08:19

## 2018-09-11 RX ADMIN — SODIUM CHLORIDE 75 ML/HR: 9 INJECTION, SOLUTION INTRAVENOUS at 14:38

## 2018-09-11 RX ADMIN — CEFAZOLIN SODIUM 2 G: 2 INJECTION, SOLUTION INTRAVENOUS at 04:27

## 2018-09-11 RX ADMIN — INSULIN DETEMIR 10 UNITS: 100 INJECTION, SOLUTION SUBCUTANEOUS at 08:18

## 2018-09-11 RX ADMIN — INSULIN ASPART 4 UNITS: 100 INJECTION, SOLUTION INTRAVENOUS; SUBCUTANEOUS at 17:19

## 2018-09-11 RX ADMIN — CARIPRAZINE 1.5 MG: 1.5 CAPSULE, GELATIN COATED ORAL at 17:18

## 2018-09-11 RX ADMIN — FLUOXETINE HYDROCHLORIDE 20 MG: 20 CAPSULE ORAL at 21:04

## 2018-09-11 NOTE — PROGRESS NOTES
Adult Nutrition  Assessment/PES    Patient Name:  Teto Castle  YOB: 1940  MRN: [id number]  Admit Date:  9/10/2018    Assessment Date:  9/11/2018    Nutrition assessment triggered by MST score-2. Patient reported good appetite, weight stable.  Patient likes ensure-recommend glucerna or boost glucose control for diabetics; he agreed to try Glucerna.  RD to monitor/follow.          Reason for Assessment     Row Name 09/11/18 1041          Reason for Assessment    Reason For Assessment identified at risk by screening criteria     Diagnosis   Primary Problem:  BPH with obstruction/lower urinary tract symptoms      Identified At Risk by Screening Criteria MST SCORE 2+               Anthropometrics     Row Name 09/11/18 1041          Body Mass Index (BMI)    BMI Assessment BMI 30-34.9: obesity grade I             Labs/Tests/Procedures/Meds     Row Name 09/11/18 1042          Labs/Procedures/Meds    Lab Results Reviewed reviewed, pertinent        Diagnostic Tests/Procedures    Diagnostic Test/Procedure Reviewed reviewed, pertinent     Diagnostic Test/Procedures Comments POD 1 TURP        Medications    Pertinent Medications Reviewed reviewed, pertinent     Pertinent Medications Comments insulin, NaCl             Physical Findings     Row Name 09/11/18 1042          Physical Findings    Overall Physical Appearance overweight   B=21             Estimated/Assessed Needs     Row Name 09/11/18 1042          Calculation Measurements    Weight Used For Calculations 90.8 kg (200 lb 2.8 oz)        Estimated/Assessed Needs    Additional Documentation KCAL/KG (Group);Protein Requirements (Group);Fluid Requirements (Group)        KCAL/KG                                                                kcal/kg (Specify) --   5921-2574        [place] Jeor Equation    RMR ([place]. Jeor Equation) 1591.75        Protein Requirements    Est Protein Requirement Amount (gms/kg) 1.1 gm protein     Estimated Protein  Requirements (gms/day) 99.88        Fluid Requirements    Estimated Fluid Requirements (mL/day) 2200     RDA Method (mL) 2200     [place]-Segar Method (over 20 kg) 3316             Nutrition Prescription Ordered     Row Name 09/11/18 1042          Nutrition Prescription PO    Common Modifiers Consistent Carbohydrate             Evaluation of Received Nutrient/Fluid Intake     Row Name 09/11/18 1042          Calculation Measurements    Weight Used For Calculations 90.8 kg (200 lb 2.8 oz)        PO Evaluation    Number of Meals 1     % PO Intake 100             Evaluation of Prescribed Nutrient/Fluid Intake     Row Name 09/11/18 1042          Calculation Measurements    Weight Used For Calculations 90.8 kg (200 lb 2.8 oz)           Problem/Interventions:        Problem 1     Row Name 09/11/18 1043          Nutrition Diagnoses Problem 1    Problem 1 Nutrition Appropriate for Condition at this Time                     Intervention Goal     Row Name 09/11/18 1043          Intervention Goal    General Maintain nutrition;Meet nutritional needs for age/condition     PO Tolerate PO;Maintain intake     Weight Maintain weight             Nutrition Intervention     Row Name 09/11/18 1043          Nutrition Intervention    RD/Tech Action Interview for preference;Follow Tx progress;Care plan reviewd;Encourage intake;Recommend/ordered;Supplement provided     Recommended/Ordered Supplement             Nutrition Prescription     Row Name 09/11/18 1043          Nutrition Prescription PO    PO Prescription Begin/change supplement     Supplement Glucerna Shake     Supplement Frequency Daily     New PO Prescription Ordered? Yes             Education/Evaluation     Row Name 09/11/18 1043          Education    Education Will Instruct as appropriate        Monitor/Evaluation    Monitor Per protocol     Education Follow-up Reinforce PRN         Electronically signed by:  Emily Cabezas RD  09/11/18 10:44 AM

## 2018-09-11 NOTE — PROGRESS NOTES
"[facility] Progress Note    Chief Complaint:  retention    Doing better, jones out and voiding    Review of Systems:    The following systems were reviewed and negative;  respiratory and cardiovascular          Vital Signs  /61 (BP Location: Left arm, Patient Position: Lying)   Pulse 68   Temp 97.4 °F (36.3 °C) (Oral)   Resp 18   Ht 170.2 cm (67.01\")   Wt 90.8 kg (200 lb 3 oz)   SpO2 97%   BMI 31.35 kg/m²     Physical Exam:     General Appearance:    Alert, cooperative, in no acute distress   Head:    Normocephalic, without obvious abnormality, atraumatic   Eyes:            Lids and lashes normal, conjunctivae and sclerae normal, no   icterus, no pallor, corneas clear, PERRLA   Ears:    Ears appear intact with no abnormalities noted   Throat:   No oral lesions, no thrush, oral mucosa moist   Neck:   No adenopathy, supple, trachea midline, no thyromegaly, no   carotid bruit, no JVD   Back:     No kyphosis present, no scoliosis present, no skin lesions,      erythema or scars, no tenderness to percussion or                   palpation,   range of motion normal   Lungs:     Clear to auscultation,respirations regular, even and                  unlabored    Heart:    Regular rhythm and normal rate, normal S1 and S2, no            murmur, no gallop, no rub, no click   Chest Wall:    No abnormalities observed   Abdomen:     Normal bowel sounds, no masses, no organomegaly, soft        non-tender, non-distended, no guarding, no rebound                tenderness   Rectal:     Deferred   Extremities:   Moves all extremities well, no edema, no cyanosis, no             redness   Pulses:   Pulses palpable and equal bilaterally   Skin:   No bleeding, bruising or rash   Lymph nodes:   No palpable adenopathy   Neurologic:   Cranial nerves 2 - 12 grossly intact, sensation intact, DTR       present and equal bilaterally        Results Review:     I reviewed the patient's new clinical results.  Lab Results (last 24 " "hours)     Procedure Component Value Units Date/Time    POC Glucose Once [[id number]]  (Abnormal) Collected:  09/11/18 1714    Specimen:  Blood Updated:  09/11/18 1719     Glucose 246 (H) mg/dL     Narrative:       Meter: [id number] : [id number] Marck Armijo RN    Tissue Pathology Exam [[id number]] Collected:  09/10/18 1227    Specimen:  Tissue from Prostate Updated:  09/11/18 1430     Case Report --     Surgical Pathology Report                         Case: [id number]                                  Authorizing Provider:  Art Dickerson MD   Collected:           09/10/2018 12:27 PM          Ordering Location:     [facility]  Received:            09/10/2018 02:06 PM                                 MAIN OR                                                                      Pathologist:           Wong De Paz MD                                                         Specimen:    Prostate, Prostatic chips                                                                   Final Diagnosis --     PROSTATE, TRANSURETHRAL RESECTION:    BENIGN PROSTATIC TISSUE WITH STROMAL AND GLANDULAR HYPERPLASIA.    FOCI OF MILD TO MODERATE CHRONIC INFLAMMATION.   NO EVIDENCE OF MALIGNANCY.     TDJ/brb     CPT CODES:  1.  57234       Gross Description --     1.  Received in formalin labeled \"prostatic chips\" is  4.5 x 4.5 x 1.5 cm 13 gram aggregate of tan fragments of tissue.  The specimen is entirely submitted in 8 blocks labeled 1A-1H.  CC/USO/TDJ/brb       Microscopic Description --     Microscopic performed, incorporated in diagnosis.       POC Glucose Once [[id number]]  (Abnormal) Collected:  09/11/18 1151    Specimen:  Blood Updated:  09/11/18 1155     Glucose 149 (H) mg/dL     Narrative:       Meter: [id number] : [id number] Rush GOMEZ    POC Glucose Once [[id number]]  (Abnormal) Collected:  09/11/18 0755    Specimen:  Blood Updated:  09/11/18 0756     Glucose 172 (H) mg/dL     Narrative:   "     Meter: [id number] : [id number] Rush GOMEZ    Basic Metabolic Panel [[id number]]  (Abnormal) Collected:  09/11/18 0604    Specimen:  Blood Updated:  09/11/18 0656     Glucose 175 (H) mg/dL      BUN 14 mg/dL      Creatinine 1.03 mg/dL      Sodium 135 (L) mmol/L      Potassium 3.9 mmol/L      Chloride 98 mmol/L      CO2 26.5 mmol/L      Calcium 8.7 mg/dL      eGFR Non African Amer 70 mL/min/1.73      BUN/Creatinine Ratio 13.6     Anion Gap 10.5 mmol/L     Narrative:       The MDRD GFR formula is only valid for adults with stable renal function between ages 18 and 70.    CBC (No Diff) [[id number]]  (Abnormal) Collected:  09/11/18 0604    Specimen:  Blood Updated:  09/11/18 0643     WBC 4.99 10*3/mm3      RBC 4.14 (L) 10*6/mm3      Hemoglobin 13.4 (L) g/dL      Hematocrit 39.4 (L) %      MCV 95.2 fL      MCH 32.4 pg      MCHC 34.0 g/dL      RDW 13.1 %      RDW-SD 45.2 fl      MPV 11.1 fL      Platelets 74 (L) 10*3/mm3     POC Glucose Once [[id number]]  (Abnormal) Collected:  09/10/18 2012    Specimen:  Blood Updated:  09/10/18 2013     Glucose 236 (H) mg/dL     Narrative:       Meter: [id number] : [id number] Toni GOMEZ        Imaging Results (last 24 hours)     ** No results found for the last 24 hours. **          Medication Review:   I have personally reviewed    Current Facility-Administered Medications:   •  belladonna-opium (B&O SUPPRETTES) suppository 30 mg, 30 mg, Rectal, Q8H PRN, Major Maharaj Jr., MD, 30 mg at 09/10/18 2207  •  Cariprazine HCl (VRAYLAR) capsule capsule 1.5 mg, 1.5 mg, Oral, Q PM, Art Dickerson MD, 1.5 mg at 09/11/18 1718  •  dextrose (D50W) 25 g/ 50mL Intravenous Solution 25 g, 25 g, Intravenous, Q15 Min PRN, Jonas Antonio MD  •  dextrose (GLUTOSE) oral gel 15 g, 15 g, Oral, Q15 Min PRN, Jonas Antonio MD  •  FLUoxetine (PROzac) capsule 20 mg, 20 mg, Oral, Daily, Art Dickerson MD  •  glucagon (human recombinant) (GLUCAGEN DIAGNOSTIC) injection  1 mg, 1 mg, Subcutaneous, PRN, Jonas Antonio MD  •  insulin aspart (novoLOG) injection 0-9 Units, 0-9 Units, Subcutaneous, 4x Daily With Meals & Nightly, Jonas Antonio MD, 4 Units at 09/11/18 1719  •  insulin detemir (LEVEMIR) injection 10 Units, 10 Units, Subcutaneous, QAM, Jonas Antonio MD, 10 Units at 09/11/18 0818  •  linagliptin (TRADJENTA) tablet 5 mg, 5 mg, Oral, Daily, Jonas Antonio MD, 5 mg at 09/11/18 0818  •  ondansetron (ZOFRAN) tablet 4 mg, 4 mg, Oral, Q6H PRN **OR** ondansetron ODT (ZOFRAN-ODT) disintegrating tablet 4 mg, 4 mg, Oral, Q6H PRN **OR** ondansetron (ZOFRAN) injection 4 mg, 4 mg, Intravenous, Q6H PRN, Art Dickerson MD  •  pneumococcal polysaccharide 23-valent (PNEUMOVAX-23) vaccine 0.5 mL, 0.5 mL, Intramuscular, During Hospitalization, Art Dickerson MD  •  sennosides-docusate sodium (SENOKOT-S) 8.6-50 MG tablet 2 tablet, 2 tablet, Oral, Nightly, Art Dickerson MD, 2 tablet at 09/10/18 2207  •  sodium chloride 0.9 % infusion, 75 mL/hr, Intravenous, Continuous, Art Dickerson MD, Last Rate: 75 mL/hr at 09/11/18 1640, 75 mL/hr at 09/11/18 1640    Allergies:    Morphine    Assessment:    Active Problems:  Patient Active Problem List   Diagnosis   • BPH with obstruction/lower urinary tract symptoms   • Essential hypertension   • Type 2 diabetes mellitus without complication (CMS/HCC)   • Thrombocytopenia (CMS/HCC)   • B12 deficiency       GIRON s/p TURP    Plan:    Home likely in am- he lives far away       Art Dickerson MD    9/11/2018  6:27 PM

## 2018-09-11 NOTE — PLAN OF CARE
Problem: Patient Care Overview  Goal: Plan of Care Review  Outcome: Ongoing (interventions implemented as appropriate)   09/11/18 0358   Coping/Psychosocial   Plan of Care Reviewed With patient   Plan of Care Review   Progress no change   OTHER   Outcome Summary NS jones irrigation cont. Urine red to pink tinged at times. B & O suppost. given x 1 for bladder spasms. Vitals stable.       Problem: Fall Risk (Adult)  Goal: Identify Related Risk Factors and Signs and Symptoms  Outcome: Ongoing (interventions implemented as appropriate)

## 2018-09-11 NOTE — PLAN OF CARE
Problem: Patient Care Overview  Goal: Plan of Care Review  Outcome: Ongoing (interventions implemented as appropriate)   09/11/18 1522   Coping/Psychosocial   Plan of Care Reviewed With patient   Plan of Care Review   Progress improving   OTHER   Outcome Summary Pt continues to have clear yellow urine through CBI this shift, orders from dr. virgen to remove. Pt DTV by 2030, instructed him to call when he feels the urge to urinate, assistance to bathroom per staff. pt verbalizes undertanding. VSS, will continue to monitor.      Goal: Individualization and Mutuality  Outcome: Ongoing (interventions implemented as appropriate)   09/11/18 1522   Individualization   Patient Specific Goals (Include Timeframe) recover from surgery and get jones out to go home   Patient Specific Interventions jones removed this afternoon       Problem: Fall Risk (Adult)  Goal: Identify Related Risk Factors and Signs and Symptoms  Outcome: Outcome(s) achieved Date Met: 09/11/18 09/11/18 1522   Fall Risk (Adult)   Related Risk Factors (Fall Risk) age-related changes;bladder function altered   Signs and Symptoms (Fall Risk) presence of risk factors     Goal: Absence of Fall  Outcome: Ongoing (interventions implemented as appropriate)      Problem: Urine Elimination Impaired (Adult)  Goal: Identify Related Risk Factors and Signs and Symptoms  Outcome: Outcome(s) achieved Date Met: 09/11/18

## 2018-09-11 NOTE — NURSING NOTE
"Falls Prevention Teach-Back Education     Teto Castle is a 78 y.o. male admitted to [facility] on 9/10/2018  8:00 AM. The encounter diagnosis was Bladder outlet obstruction.    Fall Risk Assessment  [facility] High Risk Falls Assessment (If Fall score is >/=13, add the Fall Risk CPG to the care plan)   Fallen in past 6 months: 0--> No  Mental Status: 0--> no mental status change  Elimination: 0--> No elimination issues  Mobility: 2--> Requires assistance- transfer, walker, etc.  Medications: 1--> Narcotics, 1--> Insulin/ Oral hypoglycemic  Nurses' Clinical Judgement: 6  Total Fall Risk Score: 12  Total Fall Risk Score: 12    The patient viewed the informational video on strategies to prevent falling while hospitalized entitled \"Patient Safety: Protecting Yourself in the Hospital (Part 3)\".  The patient was able to teach back at least three things that can be done to lessen the risk for falling while in the hospital.  Additionally, the patient was able to verbalize what they need to do before getting out of bed in an effort to prevent a fall.    Nurse: Zofia Acharya RN  "

## 2018-09-11 NOTE — PROGRESS NOTES
" LOS: 0 days     Name: Teto Castle  Age: 78 y.o.  Sex: male  :  1940  MRN: [id number]         Primary Care Physician: Cam Dickey MD    Subjective   Subjective  No new complaints this morning.  States that he feels better overall than he did yesterday.  He denies any dizziness or lightheadedness.    Objective   Vital Signs  Temp:  [97 °F (36.1 °C)-98.3 °F (36.8 °C)] 97.4 °F (36.3 °C)  Heart Rate:  [67-91] 67  Resp:  [12-18] 18  BP: ()/() 97/63  Body mass index is 31.35 kg/m².    Objective:  General Appearance:  Comfortable and in no acute distress.    Vital signs: (most recent): Blood pressure 97/63, pulse 67, temperature 97.4 °F (36.3 °C), temperature source Oral, resp. rate 18, height 170.2 cm (67.01\"), weight 90.8 kg (200 lb 3 oz), SpO2 97 %.    Lungs:  Normal effort and normal respiratory rate.    Heart: Normal rate.  Regular rhythm.    Abdomen: Abdomen is soft.  Bowel sounds are normal.   There is no abdominal tenderness.     Extremities: There is no dependent edema or local swelling.    Neurological: Patient is alert and oriented to person, place and time.    Skin:  Warm and dry.              Results Review:       I reviewed the patient's new clinical results.      Results from last 7 days  Lab Units 18  0604 18  1453   WBC 10*3/mm3 4.99 5.12   HEMOGLOBIN g/dL 13.4* 14.8   PLATELETS 10*3/mm3 74* 99*       Results from last 7 days  Lab Units 18  0604 18  1453   SODIUM mmol/L 135* 137   POTASSIUM mmol/L 3.9 4.5   CHLORIDE mmol/L 98 102   CO2 mmol/L 26.5 22.3   BUN mg/dL 14 16   CREATININE mg/dL 1.03 1.00   CALCIUM mg/dL 8.7 9.7   GLUCOSE mg/dL 175* 182*         Scheduled Meds:     Cariprazine HCl 1.5 mg Oral Q PM   FLUoxetine 20 mg Oral Daily   insulin aspart 0-9 Units Subcutaneous 4x Daily With Meals & Nightly   insulin detemir 10 Units Subcutaneous QAM   linagliptin 5 mg Oral Daily   sennosides-docusate sodium 2 tablet Oral Nightly     PRN Meds:   •  " belladonna-opium  •  dextrose  •  dextrose  •  glucagon (human recombinant)  •  ondansetron **OR** ondansetron ODT **OR** ondansetron  •  pneumococcal polysaccharide 23-valent  Continuous Infusions:    sodium chloride 75 mL/hr Last Rate: 75 mL/hr (09/10/18 0120)       Assessment/Plan   Active Hospital Problems    Diagnosis Date Noted   • **BPH with obstruction/lower urinary tract symptoms [N40.1, N13.8] 09/10/2018   • Essential hypertension [I10] 09/10/2018   • Type 2 diabetes mellitus without complication (CMS/[facility]) [E11.9] 09/10/2018   • Thrombocytopenia (CMS/[facility]) [D69.6] 09/10/2018   • B12 deficiency [E53.8] 09/10/2018      Resolved Hospital Problems    Diagnosis Date Noted Date Resolved   No resolved problems to display.       Assessment & Plan    BPH with Obstruction/LUTS  - s/p TURP on 9/10/18  - management per primary     Type 2 DM  - BG acceptable  - on 10 units of levemir  - cover with ssi  - hold metformin  - can restart januvia (substitute Tradjenta per formulary)     Hypertension  - BP is on the low end of normal  - hold lisinopril for now to avoid hypotension  - BMP in AM     Thrombocytopenia  - monitor cbc     B12 Deficiency  - resume home dose of B12 at discharge     DVT Prophylaxis  - SCDs, no heparin given TCP    Teto Aragon MD  [facility]  09/11/18  9:55 AM

## 2018-09-12 VITALS
HEIGHT: 67 IN | SYSTOLIC BLOOD PRESSURE: 110 MMHG | HEART RATE: 65 BPM | TEMPERATURE: 97 F | RESPIRATION RATE: 20 BRPM | OXYGEN SATURATION: 97 % | DIASTOLIC BLOOD PRESSURE: 70 MMHG | WEIGHT: 200.19 LBS | BODY MASS INDEX: 31.42 KG/M2

## 2018-09-12 LAB
BASOPHILS # BLD AUTO: 0.03 10*3/MM3 (ref 0–0.2)
BASOPHILS NFR BLD AUTO: 0.5 % (ref 0–1.5)
DEPRECATED RDW RBC AUTO: 45.8 FL (ref 37–54)
EOSINOPHIL # BLD AUTO: 0.17 10*3/MM3 (ref 0–0.7)
EOSINOPHIL NFR BLD AUTO: 3.1 % (ref 0.3–6.2)
ERYTHROCYTE [DISTWIDTH] IN BLOOD BY AUTOMATED COUNT: 13.2 % (ref 11.5–14.5)
GLUCOSE BLDC GLUCOMTR-MCNC: 141 MG/DL (ref 70–130)
GLUCOSE BLDC GLUCOMTR-MCNC: 173 MG/DL (ref 70–130)
HCT VFR BLD AUTO: 38.8 % (ref 40.4–52.2)
HGB BLD-MCNC: 13.2 G/DL (ref 13.7–17.6)
IMM GRANULOCYTES # BLD: 0 10*3/MM3 (ref 0–0.03)
IMM GRANULOCYTES NFR BLD: 0 % (ref 0–0.5)
LYMPHOCYTES # BLD AUTO: 1.33 10*3/MM3 (ref 0.9–4.8)
LYMPHOCYTES NFR BLD AUTO: 24.3 % (ref 19.6–45.3)
MCH RBC QN AUTO: 32.4 PG (ref 27–32.7)
MCHC RBC AUTO-ENTMCNC: 34 G/DL (ref 32.6–36.4)
MCV RBC AUTO: 95.3 FL (ref 79.8–96.2)
MONOCYTES # BLD AUTO: 0.57 10*3/MM3 (ref 0.2–1.2)
MONOCYTES NFR BLD AUTO: 10.4 % (ref 5–12)
NEUTROPHILS # BLD AUTO: 3.37 10*3/MM3 (ref 1.9–8.1)
NEUTROPHILS NFR BLD AUTO: 61.7 % (ref 42.7–76)
PLATELET # BLD AUTO: 75 10*3/MM3 (ref 140–500)
PMV BLD AUTO: 11.4 FL (ref 6–12)
RBC # BLD AUTO: 4.07 10*6/MM3 (ref 4.6–6)
WBC NRBC COR # BLD: 5.47 10*3/MM3 (ref 4.5–10.7)

## 2018-09-12 PROCEDURE — 85025 COMPLETE CBC W/AUTO DIFF WBC: CPT | Performed by: INTERNAL MEDICINE

## 2018-09-12 PROCEDURE — 82962 GLUCOSE BLOOD TEST: CPT

## 2018-09-12 PROCEDURE — A9270 NON-COVERED ITEM OR SERVICE: HCPCS | Performed by: INTERNAL MEDICINE

## 2018-09-12 PROCEDURE — 63710000001 INSULIN DETEMIR PER 5 UNITS: Performed by: INTERNAL MEDICINE

## 2018-09-12 PROCEDURE — 63710000001 LINAGLIPTIN 5 MG TABLET: Performed by: INTERNAL MEDICINE

## 2018-09-12 PROCEDURE — G0378 HOSPITAL OBSERVATION PER HR: HCPCS

## 2018-09-12 PROCEDURE — 63710000001 INSULIN ASPART PER 5 UNITS: Performed by: INTERNAL MEDICINE

## 2018-09-12 RX ORDER — SULFAMETHOXAZOLE AND TRIMETHOPRIM 800; 160 MG/1; MG/1
1 TABLET ORAL 2 TIMES DAILY
Qty: 14 TABLET | Refills: 0 | Status: SHIPPED | OUTPATIENT
Start: 2018-09-12 | End: 2019-03-25

## 2018-09-12 RX ADMIN — SODIUM CHLORIDE 75 ML/HR: 9 INJECTION, SOLUTION INTRAVENOUS at 04:21

## 2018-09-12 RX ADMIN — LINAGLIPTIN 5 MG: 5 TABLET, FILM COATED ORAL at 08:43

## 2018-09-12 RX ADMIN — INSULIN DETEMIR 10 UNITS: 100 INJECTION, SOLUTION SUBCUTANEOUS at 08:43

## 2018-09-12 RX ADMIN — INSULIN ASPART 2 UNITS: 100 INJECTION, SOLUTION INTRAVENOUS; SUBCUTANEOUS at 12:22

## 2018-09-12 NOTE — PROGRESS NOTES
Case Management Discharge Note    Final Note: Discharged.  No needs    Destination     No service has been selected for the patient.      Durable Medical Equipment     No service has been selected for the patient.      Dialysis/Infusion     No service has been selected for the patient.      Home Medical Care     No service has been selected for the patient.      Social Care     No service has been selected for the patient.        Other:  (Private auto)    Final Discharge Disposition Code: 01 - home or self-care

## 2018-09-12 NOTE — PROGRESS NOTES
Discharge Planning Assessment  [facility]     Patient Name: Teto Castle  MRN: [id number]  Today's Date: 9/12/2018    Admit Date: 9/10/2018          Discharge Needs Assessment     Row Name 09/12/18 0722       Living Environment    Lives With alone    Current Living Arrangements home/apartment/condo    Primary Care Provided by self    Provides Primary Care For no one    Family Caregiver if Needed child(juancho), adult    Family Caregiver Names Lucia Child/daughter [phone]    Quality of Family Relationships helpful;involved;supportive    Able to Return to Prior Arrangements yes    Living Arrangement Comments Lives alone in a multi-level house. No steps to enter. Bed & bath 1st floor.       Resource/Environmental Concerns    Resource/Environmental Concerns none       Transition Planning    Patient/Family Anticipates Transition to home with family    Patient/Family Anticipated Services at Transition none    Transportation Anticipated family or friend will provide       Discharge Needs Assessment    Concerns to be Addressed no discharge needs identified;denies needs/concerns at this time    Equipment Currently Used at Home glucometer    Anticipated Changes Related to Illness none    Equipment Needed After Discharge none    Offered/Gave Vendor List no    Patient's Choice of Community Agency(s) Denies using HH or SNF in the past.    Discharge Coordination/Progress Spoke with patient at bedside. Confirmed information on facesheet. CMS Hernandez 9/11/18.            Discharge Plan     Row Name 09/12/18 0725       Plan    Plan Plans dc home.    Patient/Family in Agreement with Plan yes    Plan Comments No needs identified. Family to transport per private auto. Continue to follow.        Destination     No service coordination in this encounter.      Durable Medical Equipment     No service coordination in this encounter.      Dialysis/Infusion     No service coordination in this encounter.      Home Medical Care     No  service coordination in this encounter.      Social Care     No service coordination in this encounter.                Demographic Summary     Row Name 09/12/18 0722       General Information    Admission Type observation    Referral Source admission list    Reason for Consult discharge planning            Functional Status     Row Name 09/12/18 0722       Functional Status    Usual Activity Tolerance good    Current Activity Tolerance good       Functional Status, IADL    Medications independent    Meal Preparation independent    Housekeeping independent    Laundry independent    Shopping independent            Psychosocial    No documentation.           Abuse/Neglect    No documentation.           Legal    No documentation.           Substance Abuse    No documentation.           Patient Forms    No documentation.         Leila Clark RN
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
## 2018-09-12 NOTE — PROGRESS NOTES
" LOS: 0 days     Name: Teto Castle  Age: 78 y.o.  Sex: male  :  1940  MRN: [id number]         Primary Care Physician: Cam Dickey MD    Subjective   Subjective  Feels better today.  No complaints.  Anxious for discharge today.    Objective   Vital Signs  Temp:  [97 °F (36.1 °C)-98.2 °F (36.8 °C)] 97 °F (36.1 °C)  Heart Rate:  [65-71] 65  Resp:  [16-20] 20  BP: (103-110)/(61-70) 110/70  Body mass index is 31.35 kg/m².    Objective:  General Appearance:  Comfortable and in no acute distress.    Vital signs: (most recent): Blood pressure 110/70, pulse 65, temperature 97 °F (36.1 °C), temperature source Oral, resp. rate 20, height 170.2 cm (67.01\"), weight 90.8 kg (200 lb 3 oz), SpO2 97 %.    Lungs:  Normal effort and normal respiratory rate.    Heart: Normal rate.  Regular rhythm.    Abdomen: Abdomen is soft.  Bowel sounds are normal.   There is no abdominal tenderness.     Extremities: There is no dependent edema or local swelling.    Neurological: Patient is alert and oriented to person, place and time.    Skin:  Warm and dry.              Results Review:       I reviewed the patient's new clinical results.      Results from last 7 days  Lab Units 18  0507 18  0604 18  1453   WBC 10*3/mm3 5.47 4.99 5.12   HEMOGLOBIN g/dL 13.2* 13.4* 14.8   PLATELETS 10*3/mm3 75* 74* 99*       Results from last 7 days  Lab Units 18  0604 18  1453   SODIUM mmol/L 135* 137   POTASSIUM mmol/L 3.9 4.5   CHLORIDE mmol/L 98 102   CO2 mmol/L 26.5 22.3   BUN mg/dL 14 16   CREATININE mg/dL 1.03 1.00   CALCIUM mg/dL 8.7 9.7   GLUCOSE mg/dL 175* 182*         Scheduled Meds:     Cariprazine HCl 1.5 mg Oral Q PM   FLUoxetine 20 mg Oral Daily   insulin aspart 0-9 Units Subcutaneous 4x Daily With Meals & Nightly   insulin detemir 10 Units Subcutaneous QAM   linagliptin 5 mg Oral Daily   sennosides-docusate sodium 2 tablet Oral Nightly     PRN Meds:   •  belladonna-opium  •  dextrose  •  dextrose  •  " glucagon (human recombinant)  •  ondansetron **OR** ondansetron ODT **OR** ondansetron  •  pneumococcal polysaccharide 23-valent  Continuous Infusions:    sodium chloride 75 mL/hr Last Rate: 75 mL/hr (09/12/18 0421)       Assessment/Plan   Active Hospital Problems    Diagnosis Date Noted   • **BPH with obstruction/lower urinary tract symptoms [N40.1, N13.8] 09/10/2018   • Essential hypertension [I10] 09/10/2018   • Type 2 diabetes mellitus without complication (CMS/[facility]) [E11.9] 09/10/2018   • Thrombocytopenia (CMS/[facility]) [D69.6] 09/10/2018   • B12 deficiency [E53.8] 09/10/2018      Resolved Hospital Problems    Diagnosis Date Noted Date Resolved   No resolved problems to display.       Assessment & Plan    - Blood glucose appears stable.  I have instructed him to resume his previous outpatient diabetic regimen upon discharge  - Blood pressure still running on the low side.  I have instructed him to hold his lisinopril and follow up with his primary care physician in one to 2 weeks.  - Thrombocytopenia appears stable.  This can be monitored in the outpatient setting.  - Okay for discharge today from a medical standpoint.      Teto Aragon MD  [facility]  09/12/18  9:32 AM

## 2019-03-25 ENCOUNTER — OFFICE VISIT (OUTPATIENT)
Dept: GASTROENTEROLOGY | Facility: CLINIC | Age: 79
End: 2019-03-25

## 2019-03-25 VITALS
HEIGHT: 67 IN | TEMPERATURE: 97.7 F | SYSTOLIC BLOOD PRESSURE: 110 MMHG | WEIGHT: 208.8 LBS | DIASTOLIC BLOOD PRESSURE: 72 MMHG | BODY MASS INDEX: 32.77 KG/M2

## 2019-03-25 DIAGNOSIS — R79.89 ABNORMAL LFTS: Primary | ICD-10-CM

## 2019-03-25 DIAGNOSIS — R93.2 ABNORMAL CT SCAN, LIVER: ICD-10-CM

## 2019-03-25 PROCEDURE — 99203 OFFICE O/P NEW LOW 30 MIN: CPT | Performed by: INTERNAL MEDICINE
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
## 2019-03-26 LAB
HAV IGM SERPL QL IA: NEGATIVE
HBV CORE IGM SERPL QL IA: NEGATIVE
HBV SURFACE AG SERPL QL IA: NEGATIVE
HCV AB S/CO SERPL IA: <0.1 S/CO RATIO (ref 0–0.9)
MITOCHONDRIA M2 IGG SER-ACNC: <20 UNITS (ref 0–20)

## 2019-04-02 ENCOUNTER — TELEPHONE (OUTPATIENT)
Dept: GASTROENTEROLOGY | Facility: CLINIC | Age: 79
End: 2019-04-02

## 2019-04-02 NOTE — TELEPHONE ENCOUNTER
Call to daughter, Chandni (see hipaa).  Advise per Dr Monsalve that labs essentially normal.  Awaiting CT results.    Chandni verb understanding. States waiting to hear about scheduling CT.   Instruct contact Schedule One.  Verb understanding.

## 2019-04-02 NOTE — TELEPHONE ENCOUNTER
----- Message from Yahir CALDERÓN MD sent at 3/26/2019  4:45 PM EDT -----  Regarding: Lab results  Okay to call results, essentially normal.  Awaiting CT scan results.  ----- Message -----  From: Interface, Reflab Results In  Sent: 3/26/2019   9:18 AM  To: Yahir CALDERÓN MD

## 2019-04-16 ENCOUNTER — HOSPITAL ENCOUNTER (OUTPATIENT)
Dept: OTHER | Facility: HOSPITAL | Age: 79
Discharge: HOME | End: 2019-04-16
Attending: FAMILY MEDICINE

## 2019-04-16 LAB
CREAT BLD-MCNC: 1 MG/DL (ref 0.6–1.4)
GFR SERPLBLD BASED ON 1.73 SQ M-ARVRAT: >60 ML/MIN/{1.73_M2}

## 2019-05-08 ENCOUNTER — APPOINTMENT (OUTPATIENT)
Dept: PREADMISSION TESTING | Facility: HOSPITAL | Age: 79
End: 2019-05-08

## 2019-05-08 VITALS
OXYGEN SATURATION: 99 % | SYSTOLIC BLOOD PRESSURE: 126 MMHG | HEART RATE: 75 BPM | HEIGHT: 67 IN | BODY MASS INDEX: 31.86 KG/M2 | RESPIRATION RATE: 18 BRPM | TEMPERATURE: 97 F | WEIGHT: 203 LBS | DIASTOLIC BLOOD PRESSURE: 75 MMHG

## 2019-05-08 LAB
ALBUMIN SERPL-MCNC: 4.1 G/DL (ref 3.5–5.2)
ALBUMIN/GLOB SERPL: 1.6 G/DL
ALP SERPL-CCNC: 70 U/L (ref 39–117)
ALT SERPL W P-5'-P-CCNC: 25 U/L (ref 1–41)
ANION GAP SERPL CALCULATED.3IONS-SCNC: 13.4 MMOL/L
AST SERPL-CCNC: 38 U/L (ref 1–40)
BILIRUB SERPL-MCNC: 1.2 MG/DL (ref 0.2–1.2)
BUN BLD-MCNC: 8 MG/DL (ref 8–23)
BUN/CREAT SERPL: 5.9 (ref 7–25)
CALCIUM SPEC-SCNC: 9.5 MG/DL (ref 8.6–10.5)
CHLORIDE SERPL-SCNC: 103 MMOL/L (ref 98–107)
CO2 SERPL-SCNC: 21.6 MMOL/L (ref 22–29)
CREAT BLD-MCNC: 1.35 MG/DL (ref 0.76–1.27)
DEPRECATED RDW RBC AUTO: 45.1 FL (ref 37–54)
ERYTHROCYTE [DISTWIDTH] IN BLOOD BY AUTOMATED COUNT: 13.2 % (ref 12.3–15.4)
GFR SERPL CREATININE-BSD FRML MDRD: 51 ML/MIN/1.73
GLOBULIN UR ELPH-MCNC: 2.6 GM/DL
GLUCOSE BLD-MCNC: 177 MG/DL (ref 65–99)
HCT VFR BLD AUTO: 43.1 % (ref 37.5–51)
HGB BLD-MCNC: 14.8 G/DL (ref 13–17.7)
MCH RBC QN AUTO: 32 PG (ref 26.6–33)
MCHC RBC AUTO-ENTMCNC: 34.3 G/DL (ref 31.5–35.7)
MCV RBC AUTO: 93.3 FL (ref 79–97)
PLATELET # BLD AUTO: 101 10*3/MM3 (ref 140–450)
PMV BLD AUTO: 11.4 FL (ref 6–12)
POTASSIUM BLD-SCNC: 4.6 MMOL/L (ref 3.5–5.2)
PROT SERPL-MCNC: 6.7 G/DL (ref 6–8.5)
RBC # BLD AUTO: 4.62 10*6/MM3 (ref 4.14–5.8)
SODIUM BLD-SCNC: 138 MMOL/L (ref 136–145)
WBC NRBC COR # BLD: 4.04 10*3/MM3 (ref 3.4–10.8)

## 2019-05-08 PROCEDURE — 36415 COLL VENOUS BLD VENIPUNCTURE: CPT

## 2019-05-08 PROCEDURE — 80053 COMPREHEN METABOLIC PANEL: CPT | Performed by: OTOLARYNGOLOGY

## 2019-05-08 PROCEDURE — 93010 ELECTROCARDIOGRAM REPORT: CPT | Performed by: INTERNAL MEDICINE

## 2019-05-08 PROCEDURE — 93005 ELECTROCARDIOGRAM TRACING: CPT

## 2019-05-08 PROCEDURE — 85027 COMPLETE CBC AUTOMATED: CPT | Performed by: OTOLARYNGOLOGY

## 2019-05-08 NOTE — DISCHARGE INSTRUCTIONS
Take the following medications the morning of surgery with a small sip of water: NONE    TO BE CALLED WITH ARRIVAL TIME    General Instructions:  • Do not eat solid food after midnight the night before surgery.  • You may drink clear liquids day of surgery but must stop at least one hour before your hospital arrival time.  • It is beneficial for you to have a clear drink that contains carbohydrates the day of surgery.  We suggest a 12 to 20 ounce bottle of Gatorade or Powerade for non-diabetic patients or a 12 to 20 ounce bottle of G2 or Powerade Zero for diabetic patients. (Pediatric patients, are not advised to drink a 12 to 20 ounce carbohydrate drink)    Clear liquids are liquids you can see through.  Nothing red in color.     Plain water                               Sports drinks  Sodas                                   Gelatin (Jell-O)  Fruit juices without pulp such as white grape juice and apple juice  Popsicles that contain no fruit or yogurt  Tea or coffee (no cream or milk added)  Gatorade / Powerade  G2 / Powerade Zero    • Infants may have breast milk up to four hours before surgery.  • Infants drinking formula may drink formula up to six hours before surgery.   • Patients who avoid smoking, chewing tobacco and alcohol for 4 weeks prior to surgery have a reduced risk of post-operative complications.  Quit smoking as many days before surgery as you can.  • Do not smoke, use chewing tobacco or drink alcohol the day of surgery.   • If applicable bring your C-PAP/ BI-PAP machine.  • Bring any papers given to you in the doctor’s office.  • Wear clean comfortable clothes and socks.  • Do not wear contact lenses, false eyelashes or make-up.  Bring a case for your glasses.   • Bring crutches or walker if applicable.  • Remove all piercings.  Leave jewelry and any other valuables at home.  • Hair extensions with metal clips must be removed prior to surgery.  • The Pre-Admission Testing nurse will instruct you  to bring medications if unable to obtain an accurate list in Pre-Admission Testing.            Preventing a Surgical Site Infection:  • For 2 to 3 days before surgery, avoid shaving with a razor because the razor can irritate skin and make it easier to develop an infection.    • Any areas of open skin can increase the risk of a post-operative wound infection by allowing bacteria to enter and travel throughout the body.  Notify your surgeon if you have any skin wounds / rashes even if it is not near the expected surgical site.  The area will need assessed to determine if surgery should be delayed until it is healed.  • The night prior to surgery sleep in a clean bed with clean clothing.  Do not allow pets to sleep with you.  • Shower on the morning of surgery using a fresh bar of anti-bacterial soap (such as Dial) and clean washcloth.  Dry with a clean towel and dress in clean clothing.  • Ask your surgeon if you will be receiving antibiotics prior to surgery.  • Make sure you, your family, and all healthcare providers clean their hands with soap and water or an alcohol based hand  before caring for you or your wound.    Day of surgery:  Upon arrival, a Pre-op nurse and Anesthesiologist will review your health history, obtain vital signs, and answer questions you may have.  The only belongings needed at this time will be a list of your home medications and if applicable your C-PAP/BI-PAP machine.  If you are staying overnight your family can leave the rest of your belongings in the car and bring them to your room later.  A Pre-op nurse will start an IV and you may receive medication in preparation for surgery, including something to help you relax.  Your family will be able to see you in the Pre-op area.  While you are in surgery your family should notify the waiting room  if they leave the waiting room area and provide a contact phone number.    Please be aware that surgery does come with  discomfort.  We want to make every effort to control your discomfort so please discuss any uncontrolled symptoms with your nurse.   Your doctor will most likely have prescribed pain medications.      If you are going home after surgery you will receive individualized written care instructions before being discharged.  A responsible adult must drive you to and from the hospital on the day of your surgery and stay with you for 24 hours.    If you are staying overnight following surgery, you will be transported to your hospital room following the recovery period.  [facility] has all private rooms.    You have received a list of surgical assistants for your reference.  If you have any questions please call Pre-Admission Testing at [phone].  Deductibles and co-payments are collected on the day of service. Please be prepared to pay the required co-pay, deductible or deposit on the day of service as defined by your plan.

## 2019-05-15 ENCOUNTER — ANESTHESIA (OUTPATIENT)
Dept: PERIOP | Facility: HOSPITAL | Age: 79
End: 2019-05-15

## 2019-05-15 ENCOUNTER — ANESTHESIA EVENT (OUTPATIENT)
Dept: PERIOP | Facility: HOSPITAL | Age: 79
End: 2019-05-15

## 2019-05-15 ENCOUNTER — HOSPITAL ENCOUNTER (OUTPATIENT)
Facility: HOSPITAL | Age: 79
Setting detail: HOSPITAL OUTPATIENT SURGERY
Discharge: HOME | End: 2019-05-15
Attending: OTOLARYNGOLOGY | Admitting: OTOLARYNGOLOGY

## 2019-05-15 VITALS
OXYGEN SATURATION: 97 % | HEART RATE: 69 BPM | SYSTOLIC BLOOD PRESSURE: 166 MMHG | DIASTOLIC BLOOD PRESSURE: 95 MMHG | TEMPERATURE: 98 F | RESPIRATION RATE: 16 BRPM

## 2019-05-15 DIAGNOSIS — J32.0 LEFT MAXILLARY SINUSITIS: ICD-10-CM

## 2019-05-15 LAB
GIE STN SPEC: NORMAL
GLUCOSE BLDC GLUCOMTR-MCNC: 107 MG/DL (ref 70–130)
GLUCOSE BLDC GLUCOMTR-MCNC: 124 MG/DL (ref 70–130)

## 2019-05-15 PROCEDURE — 88300 SURGICAL PATH GROSS: CPT | Performed by: OTOLARYNGOLOGY

## 2019-05-15 PROCEDURE — 87206 SMEAR FLUORESCENT/ACID STAI: CPT | Performed by: OTOLARYNGOLOGY

## 2019-05-15 PROCEDURE — 25010000002 EPINEPHRINE PER 0.1 MG: Performed by: OTOLARYNGOLOGY

## 2019-05-15 PROCEDURE — 25010000002 PROPOFOL 10 MG/ML EMULSION: Performed by: NURSE ANESTHETIST, CERTIFIED REGISTERED

## 2019-05-15 PROCEDURE — 88312 SPECIAL STAINS GROUP 1: CPT | Performed by: OTOLARYNGOLOGY

## 2019-05-15 PROCEDURE — 82962 GLUCOSE BLOOD TEST: CPT

## 2019-05-15 PROCEDURE — 87102 FUNGUS ISOLATION CULTURE: CPT | Performed by: OTOLARYNGOLOGY

## 2019-05-15 PROCEDURE — 25010000002 ONDANSETRON PER 1 MG: Performed by: NURSE ANESTHETIST, CERTIFIED REGISTERED

## 2019-05-15 PROCEDURE — 25010000002 PHENYLEPHRINE PER 1 ML: Performed by: NURSE ANESTHETIST, CERTIFIED REGISTERED

## 2019-05-15 PROCEDURE — 25010000002 FENTANYL CITRATE (PF) 100 MCG/2ML SOLUTION: Performed by: NURSE ANESTHETIST, CERTIFIED REGISTERED

## 2019-05-15 PROCEDURE — 88305 TISSUE EXAM BY PATHOLOGIST: CPT | Performed by: OTOLARYNGOLOGY

## 2019-05-15 RX ORDER — HYDROMORPHONE HYDROCHLORIDE 1 MG/ML
0.5 INJECTION, SOLUTION INTRAMUSCULAR; INTRAVENOUS; SUBCUTANEOUS
Status: DISCONTINUED | OUTPATIENT
Start: 2019-05-15 | End: 2019-05-15 | Stop reason: HOSPADM

## 2019-05-15 RX ORDER — NALOXONE HCL 0.4 MG/ML
0.2 VIAL (ML) INJECTION AS NEEDED
Status: DISCONTINUED | OUTPATIENT
Start: 2019-05-15 | End: 2019-05-15 | Stop reason: HOSPADM

## 2019-05-15 RX ORDER — PROMETHAZINE HYDROCHLORIDE 25 MG/ML
12.5 INJECTION, SOLUTION INTRAMUSCULAR; INTRAVENOUS ONCE AS NEEDED
Status: DISCONTINUED | OUTPATIENT
Start: 2019-05-15 | End: 2019-05-15 | Stop reason: HOSPADM

## 2019-05-15 RX ORDER — ONDANSETRON 2 MG/ML
INJECTION INTRAMUSCULAR; INTRAVENOUS AS NEEDED
Status: DISCONTINUED | OUTPATIENT
Start: 2019-05-15 | End: 2019-05-15 | Stop reason: SURG

## 2019-05-15 RX ORDER — EPINEPHRINE 1 MG/ML
INJECTION, SOLUTION, CONCENTRATE INTRAVENOUS AS NEEDED
Status: DISCONTINUED | OUTPATIENT
Start: 2019-05-15 | End: 2019-05-15 | Stop reason: HOSPADM

## 2019-05-15 RX ORDER — FAMOTIDINE 10 MG/ML
20 INJECTION, SOLUTION INTRAVENOUS ONCE
Status: COMPLETED | OUTPATIENT
Start: 2019-05-15 | End: 2019-05-15

## 2019-05-15 RX ORDER — SODIUM CHLORIDE, SODIUM LACTATE, POTASSIUM CHLORIDE, CALCIUM CHLORIDE 600; 310; 30; 20 MG/100ML; MG/100ML; MG/100ML; MG/100ML
9 INJECTION, SOLUTION INTRAVENOUS CONTINUOUS
Status: DISCONTINUED | OUTPATIENT
Start: 2019-05-15 | End: 2019-05-15 | Stop reason: HOSPADM

## 2019-05-15 RX ORDER — DIPHENHYDRAMINE HCL 25 MG
25 CAPSULE ORAL
Status: DISCONTINUED | OUTPATIENT
Start: 2019-05-15 | End: 2019-05-15 | Stop reason: HOSPADM

## 2019-05-15 RX ORDER — ACETAMINOPHEN 325 MG/1
650 TABLET ORAL ONCE AS NEEDED
Status: DISCONTINUED | OUTPATIENT
Start: 2019-05-15 | End: 2019-05-15 | Stop reason: HOSPADM

## 2019-05-15 RX ORDER — PROMETHAZINE HYDROCHLORIDE 25 MG/1
25 SUPPOSITORY RECTAL ONCE AS NEEDED
Status: DISCONTINUED | OUTPATIENT
Start: 2019-05-15 | End: 2019-05-15 | Stop reason: HOSPADM

## 2019-05-15 RX ORDER — LABETALOL HYDROCHLORIDE 5 MG/ML
5 INJECTION, SOLUTION INTRAVENOUS
Status: DISCONTINUED | OUTPATIENT
Start: 2019-05-15 | End: 2019-05-15 | Stop reason: HOSPADM

## 2019-05-15 RX ORDER — LIDOCAINE HYDROCHLORIDE 20 MG/ML
INJECTION, SOLUTION INFILTRATION; PERINEURAL AS NEEDED
Status: DISCONTINUED | OUTPATIENT
Start: 2019-05-15 | End: 2019-05-15 | Stop reason: SURG

## 2019-05-15 RX ORDER — LIDOCAINE HYDROCHLORIDE AND EPINEPHRINE 10; 10 MG/ML; UG/ML
INJECTION, SOLUTION INFILTRATION; PERINEURAL AS NEEDED
Status: DISCONTINUED | OUTPATIENT
Start: 2019-05-15 | End: 2019-05-15 | Stop reason: HOSPADM

## 2019-05-15 RX ORDER — EPHEDRINE SULFATE 50 MG/ML
5 INJECTION, SOLUTION INTRAVENOUS ONCE AS NEEDED
Status: DISCONTINUED | OUTPATIENT
Start: 2019-05-15 | End: 2019-05-15 | Stop reason: HOSPADM

## 2019-05-15 RX ORDER — ONDANSETRON 2 MG/ML
4 INJECTION INTRAMUSCULAR; INTRAVENOUS ONCE AS NEEDED
Status: DISCONTINUED | OUTPATIENT
Start: 2019-05-15 | End: 2019-05-15 | Stop reason: HOSPADM

## 2019-05-15 RX ORDER — BACITRACIN ZINC 500 [USP'U]/G
OINTMENT TOPICAL AS NEEDED
Status: DISCONTINUED | OUTPATIENT
Start: 2019-05-15 | End: 2019-05-15 | Stop reason: HOSPADM

## 2019-05-15 RX ORDER — FENTANYL CITRATE 50 UG/ML
50 INJECTION, SOLUTION INTRAMUSCULAR; INTRAVENOUS
Status: DISCONTINUED | OUTPATIENT
Start: 2019-05-15 | End: 2019-05-15 | Stop reason: HOSPADM

## 2019-05-15 RX ORDER — HYDRALAZINE HYDROCHLORIDE 20 MG/ML
5 INJECTION INTRAMUSCULAR; INTRAVENOUS
Status: DISCONTINUED | OUTPATIENT
Start: 2019-05-15 | End: 2019-05-15 | Stop reason: HOSPADM

## 2019-05-15 RX ORDER — MIDAZOLAM HYDROCHLORIDE 1 MG/ML
1 INJECTION INTRAMUSCULAR; INTRAVENOUS
Status: DISCONTINUED | OUTPATIENT
Start: 2019-05-15 | End: 2019-05-15 | Stop reason: HOSPADM

## 2019-05-15 RX ORDER — PROMETHAZINE HYDROCHLORIDE 25 MG/1
25 TABLET ORAL ONCE AS NEEDED
Status: DISCONTINUED | OUTPATIENT
Start: 2019-05-15 | End: 2019-05-15 | Stop reason: HOSPADM

## 2019-05-15 RX ORDER — LIDOCAINE HYDROCHLORIDE 10 MG/ML
0.5 INJECTION, SOLUTION EPIDURAL; INFILTRATION; INTRACAUDAL; PERINEURAL ONCE AS NEEDED
Status: DISCONTINUED | OUTPATIENT
Start: 2019-05-15 | End: 2019-05-15 | Stop reason: HOSPADM

## 2019-05-15 RX ORDER — ROCURONIUM BROMIDE 10 MG/ML
INJECTION, SOLUTION INTRAVENOUS AS NEEDED
Status: DISCONTINUED | OUTPATIENT
Start: 2019-05-15 | End: 2019-05-15 | Stop reason: SURG

## 2019-05-15 RX ORDER — SODIUM CHLORIDE 0.9 % (FLUSH) 0.9 %
1-10 SYRINGE (ML) INJECTION AS NEEDED
Status: DISCONTINUED | OUTPATIENT
Start: 2019-05-15 | End: 2019-05-15 | Stop reason: HOSPADM

## 2019-05-15 RX ORDER — FENTANYL CITRATE 50 UG/ML
INJECTION, SOLUTION INTRAMUSCULAR; INTRAVENOUS AS NEEDED
Status: DISCONTINUED | OUTPATIENT
Start: 2019-05-15 | End: 2019-05-15 | Stop reason: SURG

## 2019-05-15 RX ORDER — MAGNESIUM HYDROXIDE 1200 MG/15ML
LIQUID ORAL AS NEEDED
Status: DISCONTINUED | OUTPATIENT
Start: 2019-05-15 | End: 2019-05-15 | Stop reason: HOSPADM

## 2019-05-15 RX ORDER — OXYCODONE AND ACETAMINOPHEN 7.5; 325 MG/1; MG/1
1 TABLET ORAL ONCE AS NEEDED
Status: DISCONTINUED | OUTPATIENT
Start: 2019-05-15 | End: 2019-05-15 | Stop reason: HOSPADM

## 2019-05-15 RX ORDER — DIPHENHYDRAMINE HYDROCHLORIDE 50 MG/ML
12.5 INJECTION INTRAMUSCULAR; INTRAVENOUS
Status: DISCONTINUED | OUTPATIENT
Start: 2019-05-15 | End: 2019-05-15 | Stop reason: HOSPADM

## 2019-05-15 RX ORDER — SODIUM CHLORIDE 9 MG/ML
INJECTION, SOLUTION INTRAVENOUS AS NEEDED
Status: DISCONTINUED | OUTPATIENT
Start: 2019-05-15 | End: 2019-05-15 | Stop reason: HOSPADM

## 2019-05-15 RX ORDER — PROPOFOL 10 MG/ML
VIAL (ML) INTRAVENOUS AS NEEDED
Status: DISCONTINUED | OUTPATIENT
Start: 2019-05-15 | End: 2019-05-15 | Stop reason: SURG

## 2019-05-15 RX ORDER — OXYMETAZOLINE HYDROCHLORIDE 0.05 G/100ML
2 SPRAY NASAL 2 TIMES DAILY
Status: DISCONTINUED | OUTPATIENT
Start: 2019-05-15 | End: 2019-05-15 | Stop reason: HOSPADM

## 2019-05-15 RX ORDER — FLUMAZENIL 0.1 MG/ML
0.2 INJECTION INTRAVENOUS AS NEEDED
Status: DISCONTINUED | OUTPATIENT
Start: 2019-05-15 | End: 2019-05-15 | Stop reason: HOSPADM

## 2019-05-15 RX ORDER — GLYCOPYRROLATE 0.2 MG/ML
0.2 INJECTION INTRAMUSCULAR; INTRAVENOUS ONCE
Status: COMPLETED | OUTPATIENT
Start: 2019-05-15 | End: 2019-05-15

## 2019-05-15 RX ORDER — PROMETHAZINE HYDROCHLORIDE 25 MG/ML
6.25 INJECTION, SOLUTION INTRAMUSCULAR; INTRAVENOUS
Status: DISCONTINUED | OUTPATIENT
Start: 2019-05-15 | End: 2019-05-15 | Stop reason: HOSPADM

## 2019-05-15 RX ORDER — MIDAZOLAM HYDROCHLORIDE 1 MG/ML
2 INJECTION INTRAMUSCULAR; INTRAVENOUS
Status: DISCONTINUED | OUTPATIENT
Start: 2019-05-15 | End: 2019-05-15 | Stop reason: HOSPADM

## 2019-05-15 RX ORDER — HYDROCODONE BITARTRATE AND ACETAMINOPHEN 7.5; 325 MG/1; MG/1
1 TABLET ORAL ONCE AS NEEDED
Status: COMPLETED | OUTPATIENT
Start: 2019-05-15 | End: 2019-05-15

## 2019-05-15 RX ORDER — ACETAMINOPHEN 650 MG/1
650 SUPPOSITORY RECTAL ONCE AS NEEDED
Status: DISCONTINUED | OUTPATIENT
Start: 2019-05-15 | End: 2019-05-15 | Stop reason: HOSPADM

## 2019-05-15 RX ADMIN — ONDANSETRON 4 MG: 2 INJECTION INTRAMUSCULAR; INTRAVENOUS at 10:16

## 2019-05-15 RX ADMIN — SODIUM CHLORIDE, POTASSIUM CHLORIDE, SODIUM LACTATE AND CALCIUM CHLORIDE: 600; 310; 30; 20 INJECTION, SOLUTION INTRAVENOUS at 10:35

## 2019-05-15 RX ADMIN — PHENYLEPHRINE HYDROCHLORIDE 50 MCG: 10 INJECTION INTRAVENOUS at 08:58

## 2019-05-15 RX ADMIN — HYDROCODONE BITARTRATE AND ACETAMINOPHEN 1 TABLET: 7.5; 325 TABLET ORAL at 11:07

## 2019-05-15 RX ADMIN — FAMOTIDINE 20 MG: 10 INJECTION INTRAVENOUS at 07:56

## 2019-05-15 RX ADMIN — SODIUM CHLORIDE, POTASSIUM CHLORIDE, SODIUM LACTATE AND CALCIUM CHLORIDE 9 ML/HR: 600; 310; 30; 20 INJECTION, SOLUTION INTRAVENOUS at 07:50

## 2019-05-15 RX ADMIN — ROCURONIUM BROMIDE 50 MG: 10 INJECTION, SOLUTION INTRAVENOUS at 08:46

## 2019-05-15 RX ADMIN — FENTANYL CITRATE 50 MCG: 50 INJECTION INTRAMUSCULAR; INTRAVENOUS at 10:35

## 2019-05-15 RX ADMIN — GLYCOPYRROLATE 0.2 MG: 0.2 INJECTION, SOLUTION INTRAMUSCULAR; INTRAVENOUS at 07:56

## 2019-05-15 RX ADMIN — LIDOCAINE HYDROCHLORIDE 68 MG: 20 INJECTION, SOLUTION INFILTRATION; PERINEURAL at 08:46

## 2019-05-15 RX ADMIN — FENTANYL CITRATE 50 MCG: 50 INJECTION INTRAMUSCULAR; INTRAVENOUS at 09:01

## 2019-05-15 RX ADMIN — PROPOFOL 144 MG: 10 INJECTION, EMULSION INTRAVENOUS at 08:46

## 2019-05-15 RX ADMIN — SUGAMMADEX 200 MG: 100 INJECTION, SOLUTION INTRAVENOUS at 10:26

## 2019-05-15 RX ADMIN — FENTANYL CITRATE 50 MCG: 50 INJECTION INTRAMUSCULAR; INTRAVENOUS at 08:44

## 2019-05-15 NOTE — DISCHARGE INSTRUCTIONS
You had one Norco for pain at 11:07 AM.    Post op care as instructed by Dr. Linda.    Call Dr. Linda with any questions or concerns. Keep incision dry.    Change drip pad as needed.

## 2019-05-15 NOTE — ANESTHESIA POSTPROCEDURE EVALUATION
Patient: Teto Castle    Procedure Summary     Date:  05/15/19 Room / Location:  [facility]  /  [facility]    Anesthesia Start:  0843 Anesthesia Stop:  1039    Procedures:       NASAL SEPTOPLASTY, (N/A Nose)      ETHMOIDECTOMY,LEFT INTERNASAL  ANTROSTOMY (N/A Nose) Diagnosis:      Surgeon:  Mark Linda MD Provider:  Elia Lew MD    Anesthesia Type:  general ASA Status:  3          Anesthesia Type: general  Last vitals  BP   (!) 159/105 (05/15/19 1130)   Temp   36.7 °C (98 °F) (05/15/19 1115)   Pulse   67 (05/15/19 1130)   Resp   16 (05/15/19 1130)     SpO2   98 % (05/15/19 1130)     Post Anesthesia Care and Evaluation    Patient location during evaluation: bedside  Patient participation: complete - patient participated  Level of consciousness: awake  Pain score: 1  Pain management: adequate  Airway patency: patent  Anesthetic complications: No anesthetic complications    Cardiovascular status: acceptable  Respiratory status: acceptable  Hydration status: acceptable    Comments: ---------------------            05/15/19                1130      ---------------------   BP:     (!) 159/105   Pulse:      67        Resp:       16        Temp:                 SpO2:       98%      ---------------------

## 2019-05-15 NOTE — ANESTHESIA PREPROCEDURE EVALUATION
Anesthesia Evaluation     Patient summary reviewed and Nursing notes reviewed   NPO Solid Status: > 8 hours  NPO Liquid Status: > 2 hours           Airway   Mallampati: II  no difficulty expected  Dental - normal exam   (+) edentulous    Pulmonary     breath sounds clear to auscultation  (+) a smoker Former,   Cardiovascular     ECG reviewed  Rhythm: regular  Rate: normal    (+) hypertension, past MI , PVD,     ROS comment: MI long ago-no workup or tx    Neuro/Psych  (+) psychiatric history Anxiety and Depression,     GI/Hepatic/Renal/Endo    (+) obesity,   diabetes mellitus type 2,     Musculoskeletal     Abdominal    Substance History      OB/GYN          Other   (+) arthritis                     Anesthesia Plan    ASA 3     general     intravenous induction   Anesthetic plan, all risks, benefits, and alternatives have been provided, discussed and informed consent has been obtained with: father.

## 2019-05-15 NOTE — OP NOTE
Preoperative diagnosis: Chronic left maxillary sinusitis.    Postoperative diagnosis: Left maxillary fungal sinusitis    Operative procedures:    1.  Nasal septoplasty  2. left anterior ethmoidectomy  3.  Left maxillary sinus antrostomy with removal of fungal material from within the left maxillary sinus.    Surgeon Chmishanon    Anesthesia General    Operative findings: Following the induction of general anesthesia, utilizing oral endotracheal intubation, the patient's eyelids were taped shut with clear transparent tape.  The patient was then draped appropriately for nasal or sinus surgery.  The intranasal mucosa was vasoconstricted using topical oxymetazoline spray.  The intranasal examination, endoscopically performed revealed an S shaped nasal septal deviation, beginning to the right side anteriorly but significantly deviated to the left side more posteriorly.  A prominent left-sided premaxillary crest spur was present.    Both sides of the nasal septum were then injected with 1% Xylocaine with epinephrine 1-100,000.  Allowing for an appropriate time for vasoconstriction to occur ASIS, a septoplasty was performed through a left-sided Roddy incision, allowing elevation of the left-sided submucoperichondrial and submucoperiosteal flap.  A vertical incision was made in the anterior quadrilateral cartilage using a freer elevator allowing elevation of a contralateral or right sided nasal septal flap.  The posterior caudal cartilage of the bony cartilaginous junction were then resected using a Daniel-Rg forceps.  The vomer was submucosally resected and removed.    This procedure resulted in a small perforation involving the inferior the inferior mid nasal septum.    The septoplasty then allowed visualization of the left middle turbinate.  The anterior attachment of the left middle turbinate to the lateral nasal wall was injected with the same local anesthetic.    Allowing time for vasoconstriction to occur, the  left middle turbinate was displaced medially with a freer elevator revealing fungal material within the middle meatus.  A small neurosurgical Cottonoid dampened with topical epinephrine was placed within the middle meatus to further allow vasoconstriction.  The left uncinate process was then removed using a microdebrider with a 40 degree rad blade.  The natural opening of left next sinus was identified using a small right angle suction tip.  The antrostomy was enlarged inferiorly using a microdebrider, and posteriorly using through cut forceps.  The left maxillary sinus was filled with fungal material which was removed using various angled suction handpieces, collecting the specimen  within a sputum trap to be submitted for fungal culture and smear.    Next a hydro-debrider was used to thoroughly irrigate the left maxillary sinus    At the completion of the procedure palpation of the left eye showed no prolapse of periorbita within the left ethmoid cavity    Next the [place] incision was closed with 4-0 chromic.  Each nasal cavity was packed with a single 8.0 cm Merocel tampon, sutured with a gloved fingertip and a lubricated with bacitracin ointment.  The drawstrings of the packs were taped to the patient's cheeks with Steri-Strips, and a drip pad applied

## 2019-05-15 NOTE — ANESTHESIA PROCEDURE NOTES
Airway  Urgency: elective    Airway not difficult    General Information and Staff    Patient location during procedure: OR  Anesthesiologist: Elia Lew MD  CRNA: Jocy Rizo CRNA    Indications and Patient Condition  Indications for airway management: airway protection    Preoxygenated: yes  Mask difficulty assessment: 2 - vent by mask + OA or adjuvant +/- NMBA    Final Airway Details  Final airway type: endotracheal airway      Successful airway: ETT  Cuffed: yes   Successful intubation technique: direct laryngoscopy  Endotracheal tube insertion site: oral  Blade: Lamar  Blade size: 2  ETT size (mm): 8.0  Cormack-Lehane Classification: grade I - full view of glottis  Placement verified by: chest auscultation and capnometry   Cuff volume (mL): 8  Measured from: lips  ETT to lips (cm): 24  Number of attempts at approach: 1    Additional Comments  SIVI.  EYES TAPED CLOSED PRIOR TO DL.  INTUBATION AS CHARTED ABOVE.  APPEARS ATRAUMATIC.  NO CHANGE TO DENTITION. +ETCO2. +BBS. +CR.

## 2019-05-17 LAB
CYTO UR: NORMAL
LAB AP CASE REPORT: NORMAL
LAB AP DIAGNOSIS COMMENT: NORMAL
LAB AP SPECIAL STAINS: NORMAL
PATH REPORT.FINAL DX SPEC: NORMAL
PATH REPORT.GROSS SPEC: NORMAL

## 2019-05-30 LAB — FUNGUS WND CULT: ABNORMAL

## 2021-01-20 ENCOUNTER — HOSPITAL ENCOUNTER (OUTPATIENT)
Dept: GASTROENTEROLOGY | Facility: HOSPITAL | Age: 81
Discharge: HOME | End: 2021-01-20
Attending: FAMILY MEDICINE

## 2021-02-18 ENCOUNTER — HOSPITAL ENCOUNTER (OUTPATIENT)
Dept: OTHER | Facility: HOSPITAL | Age: 81
Discharge: HOME | End: 2021-02-18
Attending: FAMILY MEDICINE

## 2021-06-03 ENCOUNTER — OFFICE VISIT (OUTPATIENT)
Dept: NEUROLOGY | Facility: CLINIC | Age: 81
End: 2021-06-03

## 2021-06-03 VITALS — BODY MASS INDEX: 34.84 KG/M2 | HEART RATE: 71 BPM | HEIGHT: 67 IN | OXYGEN SATURATION: 100 % | WEIGHT: 222 LBS

## 2021-06-03 DIAGNOSIS — E10.9 TYPE 1 DIABETES MELLITUS WITHOUT COMPLICATIONS (HCC): ICD-10-CM

## 2021-06-03 DIAGNOSIS — G31.84 MCI (MILD COGNITIVE IMPAIRMENT): Primary | ICD-10-CM

## 2021-06-03 DIAGNOSIS — E11.9 TYPE 2 DIABETES MELLITUS WITHOUT COMPLICATION, WITHOUT LONG-TERM CURRENT USE OF INSULIN (HCC): ICD-10-CM

## 2021-06-03 PROCEDURE — 99204 OFFICE O/P NEW MOD 45 MIN: CPT | Performed by: PSYCHIATRY & NEUROLOGY

## 2021-06-03 RX ORDER — MELOXICAM 7.5 MG/1
7.5 TABLET ORAL DAILY
COMMUNITY
Start: 2021-05-13

## 2021-06-03 RX ORDER — TAMSULOSIN HYDROCHLORIDE 0.4 MG/1
1 CAPSULE ORAL EVERY EVENING
COMMUNITY
Start: 2021-05-03 | End: 2021-09-28

## 2021-06-03 RX ORDER — ATORVASTATIN CALCIUM 10 MG/1
10 TABLET, FILM COATED ORAL DAILY
Status: ON HOLD | COMMUNITY
Start: 2021-05-21 | End: 2022-08-02

## 2021-06-03 RX ORDER — PIOGLITAZONEHYDROCHLORIDE 30 MG/1
30 TABLET ORAL DAILY
COMMUNITY
Start: 2021-03-22

## 2021-06-03 RX ORDER — FINASTERIDE 5 MG/1
5 TABLET, FILM COATED ORAL DAILY
COMMUNITY
Start: 2021-05-03

## 2021-06-03 NOTE — PROGRESS NOTES
CC: Possible dementia with episodes of hallucinations    HPI:  Teto Castle is a  80 y.o.  right-handed male who we are seeing today at the request of his PCP Dr. Iraheta regarding diagnosis of dementia as well as some episodes of hallucinations.  He is in the company of his daughter today who adds to his history.  Patient's past medical history is notable for non-insulin-dependent type 2 diabetes diagnosed about 4 years ago, hypertension, hyperlipidemia, BPH status post TURP procedure, osteoarthritis, thoracic aortic aneurysm, bipolar disorder and hearing loss.     Per patient and his daughter he has had some very mild memory trouble for the last year or so, patient comments mostly he has trouble remembering names of people but specifically denies major word finding problems, retaining new information, handling his regular household tasks or getting lost in familiar places. He still drives and reports no MVAs where he was at fault or near misses. Patient currently lives with his wife and is frequently looked in on by his daughter (the one who accompanies him today) who is in the medical field. Apparently symptoms took a turn for the worse after patient contracted COVID-19. He tested positive on 1/18/2021, about 1 week after he had had his first Covid vaccination. He was seen the following day at the ER for cough, congestion shortness of breath, work-up included laboratory studies, chest x-ray and EKG which were essentially unremarkable.  There is no sign of viral pneumonia or hypoxia and he was discharged home with instruction to continue supportive care.  Per his daughter patient had extremely high fevers persisted for couple of weeks, it was at this time that he began to develop hallucinations, apparently reported seeing animals - mice and ants/bugs crawling around his house.  He also was noted to have some behavioral disturbances, per Dr. Dickey's note in February he was having intermittent episodes of  "crying, major bouts of depression - this was thought to be due to a medication change, apparently patient was switched from Vraylar to Abilify.  His Vraylar was resumed and this improved.     The hallucinations persisted a bit longer - into February and March. Of note, also around this time it was suspected that patient had a UTI - which he does get semi-frequently, this was treated with antibiotics and patient's condition improved further.  Today patient reports he has not had any hallucinations in the past several months.  He still reports some mild memory impairment but states this too has improved since his full recovery from Covid.  MMSE done in office today was abnormal with a total score of 22 out of 30.  Patient was oriented 6 out of 10, unable to recount the year, date or day - when I asked him about this he said he was (uncharacteristically) not wearing his watch and he always depends on his watch to tell what day it is.  He was able to recall 3 out of 3 objects in 3 minutes.  He did incorrectly spell world backwards earning him another two-point deduction.  He also lost points in language as he was unable to repeat the phrase no ifs, ands or buts.  Lastly he lost 1 point for sentence structure: patient wrote, \" what you do day\".  Noted that his clock draw and intersecting pentagons were within normal limits, and he was able to name 16 animals.  Patient denies any history of previous stroke or TIA though head CT done on 2/18/2021 did show an old lacunar infarct in his right parietal lobe.  He denies any history of seizure, syncope, meningitis, brain tumor or brain hemorrhage.  In terms of his family history patient reports no known dementias, Parkinson's or major stroke history in his family.  He is a former smoker and does currently use smokeless tobacco.  Also reports occasionally drinks alcohol.  He has a 6 grade level education.      I have reviewed and agree with HPI documented by KUMAR Ruano. " gns    Past Medical History:   Diagnosis Date   • Aneurysm of aorta (CMS/HCC)    • Anxiety and depression    • Arthritis    • BPH (benign prostatic hyperplasia)    • Diabetes mellitus (CMS/HCC)    • Hard of hearing    • History of frequent urinary tract infections    • History of MI (myocardial infarction)     STATES WAS TOLD HE HAD IN PAST, NEVER SAW CARDIOLOGY   • Hypertension    • Pulmonary nodules          Past Surgical History:   Procedure Laterality Date   • CATARACT EXTRACTION, BILATERAL     • COLONOSCOPY  approx 2017    normal per patient   • CYSTOSCOPY TRANSURETHRAL RESECTION OF PROSTATE N/A 9/10/2018    Procedure: TRANSURETHRAL RESECTION OF PROSTATE;  Surgeon: Art Dickerson MD;  Location: [facility] OR;  Service: Urology   • ENDOSCOPIC FUNCTIONAL SINUS SURGERY (FESS) N/A 5/15/2019    Procedure: ETHMOIDECTOMY,LEFT INTERNASAL  ANTROSTOMY;  Surgeon: Mark Linda MD;  Location: [facility] OR [facility] [place];  Service: ENT   • FRACTURE SURGERY      LT ARM   • HEMORRHOIDECTOMY     • SEPTOPLASTY N/A 5/15/2019    Procedure: NASAL SEPTOPLASTY,;  Surgeon: Mark Linda MD;  Location: [facility] OR [facility] [place];  Service: ENT           Current Outpatient Medications:   •  atorvastatin (LIPITOR) 10 MG tablet, Take 10 mg by mouth Daily., Disp: , Rfl:   •  Cariprazine HCl (VRAYLAR) 1.5 MG capsule capsule, Take 1.5 mg by mouth Every Evening., Disp: , Rfl:   •  finasteride (PROSCAR) 5 MG tablet, Take 5 mg by mouth Daily., Disp: , Rfl:   •  Insulin Degludec (TRESIBA FLEXTOUCH SC), Inject 12-15 Units under the skin into the appropriate area as directed Daily., Disp: , Rfl:   •  lisinopril (PRINIVIL,ZESTRIL) 5 MG tablet, Take 5 mg by mouth Daily., Disp: , Rfl:   •  meloxicam (MOBIC) 7.5 MG tablet, Take 7.5 mg by mouth Daily., Disp: , Rfl:   •  metFORMIN (GLUCOPHAGE) 500 MG tablet, Take 500 mg by mouth 2 (Two) Times a Day With Meals., Disp: , Rfl:   •  pioglitazone (ACTOS) 30 MG tablet, Take 30 mg by mouth Daily., Disp: , Rfl:   •   tamsulosin (FLOMAX) 0.4 MG capsule 24 hr capsule, Take 1 capsule by mouth Every Evening., Disp: , Rfl:   •  naproxen sodium (ALEVE) 220 MG tablet, Take 220 mg by mouth 2 (Two) Times a Day As Needed. PT TO HOLD FOR SURGERY, Disp: , Rfl:   •  SITagliptin (JANUVIA) 100 MG tablet, Take 100 mg by mouth Daily., Disp: , Rfl:       Family History   Problem Relation Age of Onset   • Malig Hyperthermia Neg Hx          Social History     Socioeconomic History   • Marital status:      Spouse name: Not on file   • Number of children: Not on file   • Years of education: Not on file   • Highest education level: Not on file   Tobacco Use   • Smoking status: Former Smoker     Types: Cigars, Cigarettes   • Smokeless tobacco: Current User     Types: Chew   • Tobacco comment: 25 YEARS AGO   Substance and Sexual Activity   • Alcohol use: No   • Drug use: No   • Sexual activity: Defer         Allergies   Allergen Reactions   • Morphine Itching and Confusion         Pain Scale:2/10        ROS:  Review of Systems   Constitutional: Positive for activity change, appetite change, chills, fatigue and fever.   HENT: Positive for congestion, dental problem, hearing loss and postnasal drip.    Eyes: Negative for pain, redness and itching.   Respiratory: Positive for cough, chest tightness, shortness of breath and wheezing.    Cardiovascular: Positive for palpitations and leg swelling.   Gastrointestinal: Positive for diarrhea and nausea.   Endocrine: Positive for cold intolerance. Negative for heat intolerance.   Musculoskeletal: Positive for back pain, joint swelling, neck pain and neck stiffness.   Skin: Positive for rash. Negative for color change and pallor.   Allergic/Immunologic: Negative for environmental allergies and food allergies.   Neurological: Positive for dizziness, tremors, weakness and light-headedness. Negative for seizures, syncope, facial asymmetry, speech difficulty, numbness and headaches.   Hematological:  "Bruises/bleeds easily.   Psychiatric/Behavioral: Positive for agitation, confusion, hallucinations and sleep disturbance. Negative for behavioral problems, decreased concentration, dysphoric mood, self-injury and suicidal ideas. The patient is nervous/anxious. The patient is not hyperactive.      I have reviewed the above ROS put in by medical assistant and am in agreement    Physical Exam:  Vitals:    06/03/21 1019   Pulse: 71   SpO2: 100%   Weight: 101 kg (222 lb)   Height: 170.2 cm (67\")       Body mass index is 34.77 kg/m².    Physical Exam  General: Obese white male, no acute distress  HEENT: Head is normocephalic, atraumatic.  Oropharynx is clear  Neck: Supple, no thyromegaly.  No cervical bruits  Heart: Regular rate and rhythm.  Radial pulses strong and equal bilaterally  Extremities: There is +1 pitting edema to lower extremities bilaterally.  Pedal pulses are palpable.       Neurological Exam:   Mental Status: Awake, alert, oriented 8/10.  Conversant without evidence of an affective disorder, thought disorder, delusions or hallucinations.  Attention span and concentration are normal.  HCF: No aphasia, apraxia or dysarthria.  Recent and remote memory intact.  Knowledge of recent events intact.  CN: I:   II: Visual fields full without left inattention   III, IV, VI: Eye movements intact without nystagmus or ptosis.  Pupils equal round and reactive to light.   V,VII: Light touch and pinprick intact all 3 divisions of V.  Facial muscles symmetrical.   VIII: Hearing diminished bilaterally.  Patient reports he has hearing aids but does not wear them   IX,X: Soft palate elevates symmetrically   XI: Sternomastoid and trapezius are strong.   XII: Tongue midline without atrophy or fasciculations  Motor: Normal tone and bulk in the upper and lower extremities.  No cogwheeling.  Left forearm with decreased range of motion status post previous wrist surgery.   Power testing: Strength is quite normal in the upper " extremities except for some very mild weakness detected in his right APB.  Normal power noted in all muscles tested in the lower extremities.  Reflexes: Upper extremities: +1 diffusely        Lower extremities: +1 knees, absent ankle jerks        Toe signs: Downgoing bilaterally  Sensory: Light touch: Patient reports diminished sensation in fingertips bilaterally.  Also reports slightly diminished sensation in toes bilaterally.  Otherwise normal.        Pinprick: Same as above        Vibration: Slightly diminished at ankles        Position: Intact at great toes    Cerebellar: Finger-to-nose: Intact           Rapid movement: Intact           Heel-to-shin: Intact  Gait and Station: Casual walk is slightly broad-based, arm swing is normal and no tremor is noted.  Patient is able to stand on his heels and his toes.  Tandem walk is very minimally impaired.  No Romberg, no drift    Results:      Lab Results   Component Value Date    GLUCOSE 177 (H) 05/08/2019    BUN 20 01/19/2021    CREATININE 1.17 01/19/2021    EGFRIFNONA 51 (L) 05/08/2019    BCR 17 01/19/2021    CO2 20 (L) 01/19/2021    CALCIUM 9.0 01/19/2021    ALBUMIN 4.0 01/19/2021    LABIL2 1.2 (L) 01/19/2021    AST 92 (H) 01/19/2021    ALT 39 01/19/2021       Lab Results   Component Value Date    WBC 3.44 (L) 01/19/2021    HGB 15.3 01/19/2021    HCT 42.8 01/19/2021    MCV 97.5 (H) 01/19/2021    PLT 93 (L) 01/19/2021         .No results found for: RPR      No results found for: TSH, D9JGNSF, F4BPQLE, THYROIDAB      No results found for: GNSPMXKH32      No results found for: FOLATE      No results found for: HGBA1C      Lab Results   Component Value Date    GLUCOSE 177 (H) 05/08/2019    BUN 20 01/19/2021    CREATININE 1.17 01/19/2021    EGFRIFNONA 51 (L) 05/08/2019    BCR 17 01/19/2021    K 4.1 01/19/2021    CO2 20 (L) 01/19/2021    CALCIUM 9.0 01/19/2021    ALBUMIN 4.0 01/19/2021    LABIL2 1.2 (L) 01/19/2021    AST 92 (H) 01/19/2021    ALT 39 01/19/2021         Lab  Results   Component Value Date    WBC 3.44 (L) 01/19/2021    HGB 15.3 01/19/2021    HCT 42.8 01/19/2021    MCV 97.5 (H) 01/19/2021    PLT 93 (L) 01/19/2021             Assessment:   1.  Mild cognitive impairment, with MMSE score of 22/30 today  2.  Recent episodes of hallucinations now resolved  3.  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  History of liver function abnormalities with elevated total bilirubin..    Plan:  1.  In terms of patient's cognition/memory problems, it does appear impairment exceeds what is expected in normal aging, but I am not sure that the MMSE score is truly reflective of patient's mental status (suspect that hearing loss as well as educational background may cause some misrepresentation).  As his clock drawing was normal as was his intersecting pentagons it does not look as though patient has major visuospatial dysfunction, additionally his delayed recall seemed intact -collectively these findings are not consistent with an early Alzheimer's picture.  We will go ahead and order some routine labs: Vitamin B12, folate, thyroid panel, hemoglobin A1c and RPR. Also include an ammonia level given elevated bilirubin of 2.87 in 1/2021  - Discussed that given patient's medical history/socioeconomic background, neuropsych testing may be better specifically identifying patient's areas of impairment and determining severity as it is a more comprehensive test than the quick bedside exam we did in the office today.   -Also advised that there are medications available to help symptomatically with memory and cognition, the most commonly used are cholinesterase inhibitors like Aricept or NMDA antagonist like Namenda.  As patient does not have a clear diagnosis of dementia, these type of therapies are not yet indicated though he may want to try one of these agents on a trial basis to see if there is appreciable benefit.  At present, both patient and his daughter agree they  would like to wait on any further testing or therapy and continue to monitor symptoms  2.  Concerning the hallucinations which are now resolved, suspect these were manifestations of a delirium associated with Covid infection and later perhaps the UTI.  Patient does not show any signs on exam today consistent with a Parkinson's disease: No suspicious gait dysfunction, tremor, increased muscle tone or bradykinesia.   3.  He does have some sensory deficits on exam today consistent with a polyneuropathy likely from his diabetes but he denies painful symptoms.  As such we will continue to watch this as well.  4.  Patient will follow up with us in about 3 months to check in. Also will send a copy of today's note to Dr. Iraheta's office per patient's request.        Time 45 min          Dictated utilizing Dragon dictation.

## 2021-09-28 ENCOUNTER — HOSPITAL ENCOUNTER (INPATIENT)
Facility: HOSPITAL | Age: 81
LOS: 6 days | Discharge: HOME | End: 2021-10-04
Attending: EMERGENCY MEDICINE | Admitting: INTERNAL MEDICINE

## 2021-09-28 ENCOUNTER — APPOINTMENT (OUTPATIENT)
Dept: CT IMAGING | Facility: HOSPITAL | Age: 81
End: 2021-09-28

## 2021-09-28 DIAGNOSIS — R26.2 DIFFICULTY WALKING: ICD-10-CM

## 2021-09-28 DIAGNOSIS — N39.0 ACUTE UTI: Primary | ICD-10-CM

## 2021-09-28 DIAGNOSIS — Z78.9 DECREASED ACTIVITIES OF DAILY LIVING (ADL): ICD-10-CM

## 2021-09-28 PROBLEM — R55 SYNCOPE AND COLLAPSE: Status: ACTIVE | Noted: 2021-09-28

## 2021-09-28 LAB
ALBUMIN SERPL-MCNC: 3.8 G/DL (ref 3.5–5.2)
ALBUMIN/GLOB SERPL: 1.5 G/DL
ALP SERPL-CCNC: 74 U/L (ref 39–117)
ALT SERPL W P-5'-P-CCNC: 21 U/L (ref 1–41)
ANION GAP SERPL CALCULATED.3IONS-SCNC: 12.7 MMOL/L (ref 5–15)
AST SERPL-CCNC: 53 U/L (ref 1–40)
BACTERIA UR QL AUTO: ABNORMAL /HPF
BASOPHILS # BLD AUTO: 0.05 10*3/MM3 (ref 0–0.2)
BASOPHILS NFR BLD AUTO: 1.4 % (ref 0–1.5)
BILIRUB SERPL-MCNC: 1.8 MG/DL (ref 0–1.2)
BILIRUB UR QL STRIP: NEGATIVE
BUN SERPL-MCNC: 20 MG/DL (ref 8–23)
BUN/CREAT SERPL: 13.8 (ref 7–25)
CALCIUM SPEC-SCNC: 9.2 MG/DL (ref 8.6–10.5)
CHLORIDE SERPL-SCNC: 98 MMOL/L (ref 98–107)
CK SERPL-CCNC: 131 U/L (ref 20–200)
CLARITY UR: ABNORMAL
CO2 SERPL-SCNC: 18.3 MMOL/L (ref 22–29)
COLOR UR: YELLOW
CREAT SERPL-MCNC: 1.45 MG/DL (ref 0.76–1.27)
D-LACTATE SERPL-SCNC: 3 MMOL/L (ref 0.5–2)
D-LACTATE SERPL-SCNC: 3.5 MMOL/L (ref 0.5–2)
DEPRECATED RDW RBC AUTO: 56.4 FL (ref 37–54)
EOSINOPHIL # BLD AUTO: 0.11 10*3/MM3 (ref 0–0.4)
EOSINOPHIL NFR BLD AUTO: 3 % (ref 0.3–6.2)
ERYTHROCYTE [DISTWIDTH] IN BLOOD BY AUTOMATED COUNT: 14.6 % (ref 12.3–15.4)
GFR SERPL CREATININE-BSD FRML MDRD: 47 ML/MIN/1.73
GLOBULIN UR ELPH-MCNC: 2.5 GM/DL
GLUCOSE BLDC GLUCOMTR-MCNC: 97 MG/DL (ref 70–99)
GLUCOSE SERPL-MCNC: 205 MG/DL (ref 65–99)
GLUCOSE UR STRIP-MCNC: NEGATIVE MG/DL
HCT VFR BLD AUTO: 32.7 % (ref 37.5–51)
HGB BLD-MCNC: 11.2 G/DL (ref 13–17.7)
HGB UR QL STRIP.AUTO: ABNORMAL
HOLD SPECIMEN: NORMAL
HOLD SPECIMEN: NORMAL
HYALINE CASTS UR QL AUTO: ABNORMAL /LPF
IMM GRANULOCYTES # BLD AUTO: 0.01 10*3/MM3 (ref 0–0.05)
IMM GRANULOCYTES NFR BLD AUTO: 0.3 % (ref 0–0.5)
KETONES UR QL STRIP: NEGATIVE
LEUKOCYTE ESTERASE UR QL STRIP.AUTO: ABNORMAL
LYMPHOCYTES # BLD AUTO: 1.15 10*3/MM3 (ref 0.7–3.1)
LYMPHOCYTES NFR BLD AUTO: 31.4 % (ref 19.6–45.3)
MAGNESIUM SERPL-MCNC: 1.9 MG/DL (ref 1.6–2.4)
MCH RBC QN AUTO: 35.9 PG (ref 26.6–33)
MCHC RBC AUTO-ENTMCNC: 34.3 G/DL (ref 31.5–35.7)
MCV RBC AUTO: 104.8 FL (ref 79–97)
MONOCYTES # BLD AUTO: 0.4 10*3/MM3 (ref 0.1–0.9)
MONOCYTES NFR BLD AUTO: 10.9 % (ref 5–12)
NEUTROPHILS NFR BLD AUTO: 1.94 10*3/MM3 (ref 1.7–7)
NEUTROPHILS NFR BLD AUTO: 53 % (ref 42.7–76)
NITRITE UR QL STRIP: NEGATIVE
NRBC BLD AUTO-RTO: 0 /100 WBC (ref 0–0.2)
NT-PROBNP SERPL-MCNC: 82.3 PG/ML (ref 0–1800)
PH UR STRIP.AUTO: 5.5 [PH] (ref 5–8)
PLATELET # BLD AUTO: 100 10*3/MM3 (ref 140–450)
PMV BLD AUTO: 10.2 FL (ref 6–12)
POTASSIUM SERPL-SCNC: 4.4 MMOL/L (ref 3.5–5.2)
PROT SERPL-MCNC: 6.3 G/DL (ref 6–8.5)
PROT UR QL STRIP: ABNORMAL
RBC # BLD AUTO: 3.12 10*6/MM3 (ref 4.14–5.8)
RBC # UR: ABNORMAL /HPF
REF LAB TEST METHOD: ABNORMAL
SODIUM SERPL-SCNC: 129 MMOL/L (ref 136–145)
SP GR UR STRIP: 1.02 (ref 1–1.03)
SQUAMOUS #/AREA URNS HPF: ABNORMAL /HPF
T4 FREE SERPL-MCNC: 1.04 NG/DL (ref 0.93–1.7)
TROPONIN T SERPL-MCNC: <0.01 NG/ML (ref 0–0.03)
TSH SERPL DL<=0.05 MIU/L-ACNC: 3.43 UIU/ML (ref 0.27–4.2)
UROBILINOGEN UR QL STRIP: ABNORMAL
WBC # BLD AUTO: 3.66 10*3/MM3 (ref 3.4–10.8)
WBC UR QL AUTO: ABNORMAL /HPF
WHOLE BLOOD HOLD SPECIMEN: NORMAL
WHOLE BLOOD HOLD SPECIMEN: NORMAL

## 2021-09-28 PROCEDURE — 99285 EMERGENCY DEPT VISIT HI MDM: CPT

## 2021-09-28 PROCEDURE — 87040 BLOOD CULTURE FOR BACTERIA: CPT | Performed by: EMERGENCY MEDICINE

## 2021-09-28 PROCEDURE — 87186 SC STD MICRODIL/AGAR DIL: CPT | Performed by: EMERGENCY MEDICINE

## 2021-09-28 PROCEDURE — 83605 ASSAY OF LACTIC ACID: CPT | Performed by: EMERGENCY MEDICINE

## 2021-09-28 PROCEDURE — 74177 CT ABD & PELVIS W/CONTRAST: CPT

## 2021-09-28 PROCEDURE — 0 IOPAMIDOL PER 1 ML: Performed by: EMERGENCY MEDICINE

## 2021-09-28 PROCEDURE — 83880 ASSAY OF NATRIURETIC PEPTIDE: CPT | Performed by: EMERGENCY MEDICINE

## 2021-09-28 PROCEDURE — 81001 URINALYSIS AUTO W/SCOPE: CPT | Performed by: EMERGENCY MEDICINE

## 2021-09-28 PROCEDURE — 70450 CT HEAD/BRAIN W/O DYE: CPT

## 2021-09-28 PROCEDURE — 93005 ELECTROCARDIOGRAM TRACING: CPT | Performed by: EMERGENCY MEDICINE

## 2021-09-28 PROCEDURE — 85025 COMPLETE CBC W/AUTO DIFF WBC: CPT

## 2021-09-28 PROCEDURE — 87150 DNA/RNA AMPLIFIED PROBE: CPT | Performed by: EMERGENCY MEDICINE

## 2021-09-28 PROCEDURE — 84484 ASSAY OF TROPONIN QUANT: CPT

## 2021-09-28 PROCEDURE — 99223 1ST HOSP IP/OBS HIGH 75: CPT | Performed by: INTERNAL MEDICINE

## 2021-09-28 PROCEDURE — 80053 COMPREHEN METABOLIC PANEL: CPT

## 2021-09-28 PROCEDURE — 25010000002 CEFTRIAXONE PER 250 MG: Performed by: EMERGENCY MEDICINE

## 2021-09-28 PROCEDURE — 84443 ASSAY THYROID STIM HORMONE: CPT | Performed by: EMERGENCY MEDICINE

## 2021-09-28 PROCEDURE — 87086 URINE CULTURE/COLONY COUNT: CPT | Performed by: EMERGENCY MEDICINE

## 2021-09-28 PROCEDURE — 83735 ASSAY OF MAGNESIUM: CPT

## 2021-09-28 PROCEDURE — 82550 ASSAY OF CK (CPK): CPT | Performed by: EMERGENCY MEDICINE

## 2021-09-28 PROCEDURE — 82962 GLUCOSE BLOOD TEST: CPT

## 2021-09-28 PROCEDURE — 93010 ELECTROCARDIOGRAM REPORT: CPT | Performed by: SPECIALIST

## 2021-09-28 PROCEDURE — 87077 CULTURE AEROBIC IDENTIFY: CPT | Performed by: EMERGENCY MEDICINE

## 2021-09-28 PROCEDURE — 93005 ELECTROCARDIOGRAM TRACING: CPT

## 2021-09-28 PROCEDURE — 84439 ASSAY OF FREE THYROXINE: CPT | Performed by: EMERGENCY MEDICINE

## 2021-09-28 RX ORDER — DEXTROSE MONOHYDRATE 100 MG/ML
25 INJECTION, SOLUTION INTRAVENOUS
Status: DISCONTINUED | OUTPATIENT
Start: 2021-09-28 | End: 2021-10-04 | Stop reason: HOSPADM

## 2021-09-28 RX ORDER — CHOLECALCIFEROL (VITAMIN D3) 125 MCG
5 CAPSULE ORAL NIGHTLY PRN
Status: DISCONTINUED | OUTPATIENT
Start: 2021-09-28 | End: 2021-10-04 | Stop reason: HOSPADM

## 2021-09-28 RX ORDER — FAMOTIDINE 20 MG/1
40 TABLET, FILM COATED ORAL DAILY
Status: DISCONTINUED | OUTPATIENT
Start: 2021-09-28 | End: 2021-10-04 | Stop reason: HOSPADM

## 2021-09-28 RX ORDER — CEFTRIAXONE SODIUM 1 G/50ML
1 INJECTION, SOLUTION INTRAVENOUS ONCE
Status: COMPLETED | OUTPATIENT
Start: 2021-09-28 | End: 2021-09-28

## 2021-09-28 RX ORDER — CHOLECALCIFEROL (VITAMIN D3) 125 MCG
5 CAPSULE ORAL NIGHTLY
Status: ON HOLD | COMMUNITY
End: 2022-11-04

## 2021-09-28 RX ORDER — TRAZODONE HYDROCHLORIDE 50 MG/1
50 TABLET ORAL NIGHTLY
Status: DISCONTINUED | OUTPATIENT
Start: 2021-09-28 | End: 2021-10-04 | Stop reason: HOSPADM

## 2021-09-28 RX ORDER — SODIUM CHLORIDE 0.9 % (FLUSH) 0.9 %
10 SYRINGE (ML) INJECTION EVERY 12 HOURS SCHEDULED
Status: DISCONTINUED | OUTPATIENT
Start: 2021-09-28 | End: 2021-10-04 | Stop reason: HOSPADM

## 2021-09-28 RX ORDER — NICOTINE POLACRILEX 4 MG
15 LOZENGE BUCCAL
Status: DISCONTINUED | OUTPATIENT
Start: 2021-09-28 | End: 2021-10-04 | Stop reason: HOSPADM

## 2021-09-28 RX ORDER — SODIUM CHLORIDE 0.9 % (FLUSH) 0.9 %
10 SYRINGE (ML) INJECTION AS NEEDED
Status: DISCONTINUED | OUTPATIENT
Start: 2021-09-28 | End: 2021-10-04 | Stop reason: HOSPADM

## 2021-09-28 RX ORDER — LORAZEPAM 0.5 MG/1
0.5 TABLET ORAL EVERY 6 HOURS
Status: DISPENSED | OUTPATIENT
Start: 2021-09-28 | End: 2021-09-29

## 2021-09-28 RX ORDER — POLYETHYLENE GLYCOL 3350 17 G/17G
17 POWDER, FOR SOLUTION ORAL DAILY PRN
Status: DISCONTINUED | OUTPATIENT
Start: 2021-09-28 | End: 2021-10-04 | Stop reason: HOSPADM

## 2021-09-28 RX ORDER — BISACODYL 10 MG
10 SUPPOSITORY, RECTAL RECTAL DAILY PRN
Status: DISCONTINUED | OUTPATIENT
Start: 2021-09-28 | End: 2021-10-04 | Stop reason: HOSPADM

## 2021-09-28 RX ORDER — ONDANSETRON 2 MG/ML
4 INJECTION INTRAMUSCULAR; INTRAVENOUS EVERY 6 HOURS PRN
Status: DISCONTINUED | OUTPATIENT
Start: 2021-09-28 | End: 2021-10-04 | Stop reason: HOSPADM

## 2021-09-28 RX ORDER — LORAZEPAM 0.5 MG/1
0.5 TABLET ORAL EVERY 8 HOURS
Status: DISPENSED | OUTPATIENT
Start: 2021-09-29 | End: 2021-09-30

## 2021-09-28 RX ORDER — ATORVASTATIN CALCIUM 10 MG/1
10 TABLET, FILM COATED ORAL DAILY
Status: DISCONTINUED | OUTPATIENT
Start: 2021-09-28 | End: 2021-09-30

## 2021-09-28 RX ORDER — FINASTERIDE 5 MG/1
5 TABLET, FILM COATED ORAL DAILY
Status: DISCONTINUED | OUTPATIENT
Start: 2021-09-28 | End: 2021-10-04 | Stop reason: HOSPADM

## 2021-09-28 RX ORDER — SODIUM CHLORIDE 9 MG/ML
75 INJECTION, SOLUTION INTRAVENOUS CONTINUOUS
Status: DISCONTINUED | OUTPATIENT
Start: 2021-09-28 | End: 2021-10-02

## 2021-09-28 RX ORDER — CEFTRIAXONE SODIUM 1 G/50ML
1 INJECTION, SOLUTION INTRAVENOUS EVERY 24 HOURS
Status: DISCONTINUED | OUTPATIENT
Start: 2021-09-29 | End: 2021-10-04 | Stop reason: HOSPADM

## 2021-09-28 RX ORDER — MULTIPLE VITAMINS W/ MINERALS TAB 9MG-400MCG
1 TAB ORAL DAILY
Status: DISCONTINUED | OUTPATIENT
Start: 2021-09-28 | End: 2021-10-04 | Stop reason: HOSPADM

## 2021-09-28 RX ORDER — AMOXICILLIN 250 MG
2 CAPSULE ORAL 2 TIMES DAILY
Status: DISCONTINUED | OUTPATIENT
Start: 2021-09-28 | End: 2021-10-04 | Stop reason: HOSPADM

## 2021-09-28 RX ORDER — BISACODYL 5 MG/1
5 TABLET, DELAYED RELEASE ORAL DAILY PRN
Status: DISCONTINUED | OUTPATIENT
Start: 2021-09-28 | End: 2021-10-04 | Stop reason: HOSPADM

## 2021-09-28 RX ADMIN — CEFTRIAXONE SODIUM 1 G: 1 INJECTION, SOLUTION INTRAVENOUS at 18:35

## 2021-09-28 RX ADMIN — FINASTERIDE 5 MG: 5 TABLET, FILM COATED ORAL at 20:57

## 2021-09-28 RX ADMIN — IOPAMIDOL 100 ML: 755 INJECTION, SOLUTION INTRAVENOUS at 15:20

## 2021-09-28 RX ADMIN — LORAZEPAM 0.5 MG: 0.5 TABLET ORAL at 20:57

## 2021-09-28 RX ADMIN — SODIUM CHLORIDE 1000 ML: 9 INJECTION, SOLUTION INTRAVENOUS at 15:03

## 2021-09-28 RX ADMIN — FAMOTIDINE 40 MG: 20 TABLET, FILM COATED ORAL at 20:57

## 2021-09-28 RX ADMIN — TRAZODONE HYDROCHLORIDE 50 MG: 50 TABLET ORAL at 20:57

## 2021-09-28 RX ADMIN — ATORVASTATIN CALCIUM 10 MG: 10 TABLET, FILM COATED ORAL at 20:57

## 2021-09-28 RX ADMIN — SODIUM CHLORIDE 1000 ML: 9 INJECTION, SOLUTION INTRAVENOUS at 14:29

## 2021-09-28 RX ADMIN — DOCUSATE SODIUM 50MG AND SENNOSIDES 8.6MG 2 TABLET: 8.6; 5 TABLET, FILM COATED ORAL at 20:57

## 2021-09-28 RX ADMIN — MULTIPLE VITAMINS W/ MINERALS TAB 1 TABLET: TAB at 20:57

## 2021-09-29 LAB
ALBUMIN SERPL-MCNC: 3.4 G/DL (ref 3.5–5.2)
ALBUMIN/GLOB SERPL: 1.5 G/DL
ALP SERPL-CCNC: 73 U/L (ref 39–117)
ALT SERPL W P-5'-P-CCNC: 18 U/L (ref 1–41)
ANION GAP SERPL CALCULATED.3IONS-SCNC: 9.5 MMOL/L (ref 5–15)
AST SERPL-CCNC: 37 U/L (ref 1–40)
BASOPHILS # BLD AUTO: 0.04 10*3/MM3 (ref 0–0.2)
BASOPHILS NFR BLD AUTO: 1 % (ref 0–1.5)
BILIRUB SERPL-MCNC: 1.5 MG/DL (ref 0–1.2)
BUN SERPL-MCNC: 15 MG/DL (ref 8–23)
BUN/CREAT SERPL: 12.9 (ref 7–25)
CALCIUM SPEC-SCNC: 8.6 MG/DL (ref 8.6–10.5)
CHLORIDE SERPL-SCNC: 106 MMOL/L (ref 98–107)
CO2 SERPL-SCNC: 19.5 MMOL/L (ref 22–29)
CREAT SERPL-MCNC: 1.16 MG/DL (ref 0.76–1.27)
D-LACTATE SERPL-SCNC: 1.9 MMOL/L (ref 0.5–2)
DEPRECATED RDW RBC AUTO: 54.2 FL (ref 37–54)
EOSINOPHIL # BLD AUTO: 0.14 10*3/MM3 (ref 0–0.4)
EOSINOPHIL NFR BLD AUTO: 3.4 % (ref 0.3–6.2)
ERYTHROCYTE [DISTWIDTH] IN BLOOD BY AUTOMATED COUNT: 14.6 % (ref 12.3–15.4)
GFR SERPL CREATININE-BSD FRML MDRD: 61 ML/MIN/1.73
GLOBULIN UR ELPH-MCNC: 2.2 GM/DL
GLUCOSE BLDC GLUCOMTR-MCNC: 100 MG/DL (ref 70–99)
GLUCOSE BLDC GLUCOMTR-MCNC: 137 MG/DL (ref 70–99)
GLUCOSE BLDC GLUCOMTR-MCNC: 156 MG/DL (ref 70–99)
GLUCOSE BLDC GLUCOMTR-MCNC: 167 MG/DL (ref 70–99)
GLUCOSE SERPL-MCNC: 107 MG/DL (ref 65–99)
HCT VFR BLD AUTO: 30.5 % (ref 37.5–51)
HGB BLD-MCNC: 10.8 G/DL (ref 13–17.7)
IMM GRANULOCYTES # BLD AUTO: 0.01 10*3/MM3 (ref 0–0.05)
IMM GRANULOCYTES NFR BLD AUTO: 0.2 % (ref 0–0.5)
LYMPHOCYTES # BLD AUTO: 1.48 10*3/MM3 (ref 0.7–3.1)
LYMPHOCYTES NFR BLD AUTO: 36 % (ref 19.6–45.3)
MAGNESIUM SERPL-MCNC: 1.9 MG/DL (ref 1.6–2.4)
MCH RBC QN AUTO: 36.1 PG (ref 26.6–33)
MCHC RBC AUTO-ENTMCNC: 35.4 G/DL (ref 31.5–35.7)
MCV RBC AUTO: 102 FL (ref 79–97)
MONOCYTES # BLD AUTO: 0.4 10*3/MM3 (ref 0.1–0.9)
MONOCYTES NFR BLD AUTO: 9.7 % (ref 5–12)
NEUTROPHILS NFR BLD AUTO: 2.04 10*3/MM3 (ref 1.7–7)
NEUTROPHILS NFR BLD AUTO: 49.7 % (ref 42.7–76)
NRBC BLD AUTO-RTO: 0 /100 WBC (ref 0–0.2)
PHOSPHATE SERPL-MCNC: 2.8 MG/DL (ref 2.5–4.5)
PLATELET # BLD AUTO: 84 10*3/MM3 (ref 140–450)
PMV BLD AUTO: 10 FL (ref 6–12)
POTASSIUM SERPL-SCNC: 4.1 MMOL/L (ref 3.5–5.2)
PROT SERPL-MCNC: 5.6 G/DL (ref 6–8.5)
RBC # BLD AUTO: 2.99 10*6/MM3 (ref 4.14–5.8)
SODIUM SERPL-SCNC: 135 MMOL/L (ref 136–145)
WBC # BLD AUTO: 4.11 10*3/MM3 (ref 3.4–10.8)

## 2021-09-29 PROCEDURE — 83735 ASSAY OF MAGNESIUM: CPT | Performed by: INTERNAL MEDICINE

## 2021-09-29 PROCEDURE — 84100 ASSAY OF PHOSPHORUS: CPT | Performed by: INTERNAL MEDICINE

## 2021-09-29 PROCEDURE — 63710000001 INSULIN LISPRO (HUMAN) PER 5 UNITS: Performed by: INTERNAL MEDICINE

## 2021-09-29 PROCEDURE — 85025 COMPLETE CBC W/AUTO DIFF WBC: CPT | Performed by: INTERNAL MEDICINE

## 2021-09-29 PROCEDURE — 82962 GLUCOSE BLOOD TEST: CPT

## 2021-09-29 PROCEDURE — 80053 COMPREHEN METABOLIC PANEL: CPT | Performed by: INTERNAL MEDICINE

## 2021-09-29 PROCEDURE — 25010000002 CEFTRIAXONE PER 250 MG: Performed by: INTERNAL MEDICINE

## 2021-09-29 PROCEDURE — 94799 UNLISTED PULMONARY SVC/PX: CPT

## 2021-09-29 PROCEDURE — 99232 SBSQ HOSP IP/OBS MODERATE 35: CPT | Performed by: INTERNAL MEDICINE

## 2021-09-29 PROCEDURE — 94760 N-INVAS EAR/PLS OXIMETRY 1: CPT

## 2021-09-29 PROCEDURE — 83605 ASSAY OF LACTIC ACID: CPT | Performed by: INTERNAL MEDICINE

## 2021-09-29 RX ORDER — CHOLECALCIFEROL (VITAMIN D3) 125 MCG
5 CAPSULE ORAL NIGHTLY
Status: DISCONTINUED | OUTPATIENT
Start: 2021-09-29 | End: 2021-10-04 | Stop reason: HOSPADM

## 2021-09-29 RX ORDER — CHOLECALCIFEROL (VITAMIN D3) 125 MCG
5 CAPSULE ORAL NIGHTLY
Status: CANCELLED | OUTPATIENT
Start: 2021-09-29

## 2021-09-29 RX ADMIN — FINASTERIDE 5 MG: 5 TABLET, FILM COATED ORAL at 08:40

## 2021-09-29 RX ADMIN — SODIUM CHLORIDE, PRESERVATIVE FREE 10 ML: 5 INJECTION INTRAVENOUS at 08:42

## 2021-09-29 RX ADMIN — ATORVASTATIN CALCIUM 10 MG: 10 TABLET, FILM COATED ORAL at 08:48

## 2021-09-29 RX ADMIN — CEFTRIAXONE SODIUM 1 G: 1 INJECTION, SOLUTION INTRAVENOUS at 08:40

## 2021-09-29 RX ADMIN — SODIUM CHLORIDE 125 ML/HR: 9 INJECTION, SOLUTION INTRAVENOUS at 10:03

## 2021-09-29 RX ADMIN — FAMOTIDINE 40 MG: 20 TABLET, FILM COATED ORAL at 08:42

## 2021-09-29 RX ADMIN — INSULIN LISPRO 2 UNITS: 100 INJECTION, SOLUTION INTRAVENOUS; SUBCUTANEOUS at 12:30

## 2021-09-29 RX ADMIN — MULTIPLE VITAMINS W/ MINERALS TAB 1 TABLET: TAB at 08:41

## 2021-09-29 RX ADMIN — LORAZEPAM 0.5 MG: 0.5 TABLET ORAL at 08:41

## 2021-09-29 RX ADMIN — DOCUSATE SODIUM 50MG AND SENNOSIDES 8.6MG 2 TABLET: 8.6; 5 TABLET, FILM COATED ORAL at 08:41

## 2021-09-29 RX ADMIN — LORAZEPAM 0.5 MG: 0.5 TABLET ORAL at 21:11

## 2021-09-29 RX ADMIN — LORAZEPAM 0.5 MG: 0.5 TABLET ORAL at 15:01

## 2021-09-29 RX ADMIN — Medication 5 MG: at 21:10

## 2021-09-30 ENCOUNTER — APPOINTMENT (OUTPATIENT)
Dept: CARDIOLOGY | Facility: HOSPITAL | Age: 81
End: 2021-09-30

## 2021-09-30 LAB
ANION GAP SERPL CALCULATED.3IONS-SCNC: 9.5 MMOL/L (ref 5–15)
BACTERIA BLD CULT: NORMAL
BH CV ECHO MEAS - AO ROOT DIAM: 2.6 CM
BH CV ECHO MEAS - EF(MOD-BP): 65 %
BH CV ECHO MEAS - IVSD: 1 CM
BH CV ECHO MEAS - LA DIMENSION(2D): 3.6 CM
BH CV ECHO MEAS - LAT PEAK E' VEL: 7 CM/SEC
BH CV ECHO MEAS - LVIDD: 4.9 CM
BH CV ECHO MEAS - LVIDS: 3.4 CM
BH CV ECHO MEAS - LVPWD: 0.9 CM
BH CV ECHO MEAS - MED PEAK E' VEL: 8 CM/SEC
BH CV ECHO MEAS - MV A MAX VEL: 91 CM/SEC
BH CV ECHO MEAS - MV DEC TIME: 168 MSEC
BH CV ECHO MEAS - MV E MAX VEL: 74 CM/SEC
BH CV ECHO MEAS - MV E/A: 0.8
BH CV ECHO MEASUREMENTS AVERAGE E/E' RATIO: 9.87
BUN SERPL-MCNC: 11 MG/DL (ref 8–23)
BUN/CREAT SERPL: 10 (ref 7–25)
CALCIUM SPEC-SCNC: 9.1 MG/DL (ref 8.6–10.5)
CHLORIDE SERPL-SCNC: 102 MMOL/L (ref 98–107)
CO2 SERPL-SCNC: 21.5 MMOL/L (ref 22–29)
CREAT SERPL-MCNC: 1.1 MG/DL (ref 0.76–1.27)
GFR SERPL CREATININE-BSD FRML MDRD: 64 ML/MIN/1.73
GLUCOSE BLDC GLUCOMTR-MCNC: 122 MG/DL (ref 70–99)
GLUCOSE BLDC GLUCOMTR-MCNC: 137 MG/DL (ref 70–99)
GLUCOSE BLDC GLUCOMTR-MCNC: 143 MG/DL (ref 70–99)
GLUCOSE BLDC GLUCOMTR-MCNC: 156 MG/DL (ref 70–99)
GLUCOSE SERPL-MCNC: 123 MG/DL (ref 65–99)
IVRT: 66 MSEC
LEFT ATRIUM VOLUME INDEX: 13 ML/M2
MAXIMAL PREDICTED HEART RATE: 140 BPM
POTASSIUM SERPL-SCNC: 4.2 MMOL/L (ref 3.5–5.2)
SODIUM SERPL-SCNC: 133 MMOL/L (ref 136–145)
STRESS TARGET HR: 119 BPM

## 2021-09-30 PROCEDURE — 99233 SBSQ HOSP IP/OBS HIGH 50: CPT | Performed by: INTERNAL MEDICINE

## 2021-09-30 PROCEDURE — 25010000002 CEFTRIAXONE PER 250 MG: Performed by: INTERNAL MEDICINE

## 2021-09-30 PROCEDURE — 93306 TTE W/DOPPLER COMPLETE: CPT | Performed by: INTERNAL MEDICINE

## 2021-09-30 PROCEDURE — 63710000001 INSULIN LISPRO (HUMAN) PER 5 UNITS: Performed by: INTERNAL MEDICINE

## 2021-09-30 PROCEDURE — 93306 TTE W/DOPPLER COMPLETE: CPT

## 2021-09-30 PROCEDURE — 82962 GLUCOSE BLOOD TEST: CPT

## 2021-09-30 PROCEDURE — 25010000002 LORAZEPAM PER 2 MG: Performed by: INTERNAL MEDICINE

## 2021-09-30 PROCEDURE — 94799 UNLISTED PULMONARY SVC/PX: CPT

## 2021-09-30 PROCEDURE — 80048 BASIC METABOLIC PNL TOTAL CA: CPT | Performed by: INTERNAL MEDICINE

## 2021-09-30 PROCEDURE — 25010000002 VANCOMYCIN 5 G RECONSTITUTED SOLUTION: Performed by: INTERNAL MEDICINE

## 2021-09-30 RX ORDER — LORAZEPAM 0.5 MG/1
1 TABLET ORAL
Status: DISCONTINUED | OUTPATIENT
Start: 2021-09-30 | End: 2021-10-04 | Stop reason: HOSPADM

## 2021-09-30 RX ORDER — LORAZEPAM 0.5 MG/1
0.5 TABLET ORAL
Status: DISCONTINUED | OUTPATIENT
Start: 2021-09-30 | End: 2021-10-04 | Stop reason: HOSPADM

## 2021-09-30 RX ORDER — LORAZEPAM 2 MG/ML
1 INJECTION INTRAMUSCULAR
Status: DISCONTINUED | OUTPATIENT
Start: 2021-09-30 | End: 2021-10-04 | Stop reason: HOSPADM

## 2021-09-30 RX ORDER — LORAZEPAM 0.5 MG/1
0.5 TABLET ORAL ONCE
Status: COMPLETED | OUTPATIENT
Start: 2021-09-30 | End: 2021-09-30

## 2021-09-30 RX ORDER — LORAZEPAM 2 MG/ML
0.5 INJECTION INTRAMUSCULAR
Status: DISCONTINUED | OUTPATIENT
Start: 2021-09-30 | End: 2021-10-04 | Stop reason: HOSPADM

## 2021-09-30 RX ORDER — LORAZEPAM 2 MG/ML
0.5 INJECTION INTRAMUSCULAR EVERY 8 HOURS
Status: DISPENSED | OUTPATIENT
Start: 2021-10-01 | End: 2021-10-02

## 2021-09-30 RX ORDER — LORAZEPAM 2 MG/ML
0.5 INJECTION INTRAMUSCULAR EVERY 6 HOURS
Status: COMPLETED | OUTPATIENT
Start: 2021-09-30 | End: 2021-10-01

## 2021-09-30 RX ORDER — ATORVASTATIN CALCIUM 10 MG/1
10 TABLET, FILM COATED ORAL NIGHTLY
Status: DISCONTINUED | OUTPATIENT
Start: 2021-09-30 | End: 2021-10-04 | Stop reason: HOSPADM

## 2021-09-30 RX ORDER — LISINOPRIL 5 MG/1
5 TABLET ORAL
Status: DISCONTINUED | OUTPATIENT
Start: 2021-09-30 | End: 2021-10-04 | Stop reason: HOSPADM

## 2021-09-30 RX ADMIN — DOCUSATE SODIUM 50MG AND SENNOSIDES 8.6MG 2 TABLET: 8.6; 5 TABLET, FILM COATED ORAL at 21:06

## 2021-09-30 RX ADMIN — CEFTRIAXONE SODIUM 1 G: 1 INJECTION, SOLUTION INTRAVENOUS at 10:57

## 2021-09-30 RX ADMIN — TRAZODONE HYDROCHLORIDE 50 MG: 50 TABLET ORAL at 21:06

## 2021-09-30 RX ADMIN — ATORVASTATIN CALCIUM 10 MG: 10 TABLET, FILM COATED ORAL at 21:06

## 2021-09-30 RX ADMIN — VANCOMYCIN HYDROCHLORIDE 2000 MG: 5 INJECTION, POWDER, LYOPHILIZED, FOR SOLUTION INTRAVENOUS at 11:30

## 2021-09-30 RX ADMIN — LORAZEPAM 0.5 MG: 0.5 TABLET ORAL at 01:10

## 2021-09-30 RX ADMIN — LORAZEPAM 0.5 MG: 2 INJECTION INTRAMUSCULAR; INTRAVENOUS at 12:37

## 2021-09-30 RX ADMIN — LORAZEPAM 0.5 MG: 2 INJECTION INTRAMUSCULAR; INTRAVENOUS at 18:24

## 2021-09-30 RX ADMIN — INSULIN LISPRO 2 UNITS: 100 INJECTION, SOLUTION INTRAVENOUS; SUBCUTANEOUS at 17:14

## 2021-09-30 RX ADMIN — LORAZEPAM 0.5 MG: 0.5 TABLET ORAL at 04:44

## 2021-09-30 RX ADMIN — SODIUM CHLORIDE, PRESERVATIVE FREE 10 ML: 5 INJECTION INTRAVENOUS at 11:56

## 2021-09-30 RX ADMIN — Medication 5 MG: at 21:06

## 2021-09-30 RX ADMIN — LISINOPRIL 5 MG: 5 TABLET ORAL at 12:37

## 2021-10-01 LAB
ANION GAP SERPL CALCULATED.3IONS-SCNC: 10.9 MMOL/L (ref 5–15)
BUN SERPL-MCNC: 9 MG/DL (ref 8–23)
BUN/CREAT SERPL: 8.8 (ref 7–25)
CALCIUM SPEC-SCNC: 9.5 MG/DL (ref 8.6–10.5)
CHLORIDE SERPL-SCNC: 102 MMOL/L (ref 98–107)
CO2 SERPL-SCNC: 22.1 MMOL/L (ref 22–29)
CREAT SERPL-MCNC: 1.02 MG/DL (ref 0.76–1.27)
GFR SERPL CREATININE-BSD FRML MDRD: 70 ML/MIN/1.73
GLUCOSE BLDC GLUCOMTR-MCNC: 147 MG/DL (ref 70–99)
GLUCOSE BLDC GLUCOMTR-MCNC: 153 MG/DL (ref 70–99)
GLUCOSE BLDC GLUCOMTR-MCNC: 160 MG/DL (ref 70–99)
GLUCOSE BLDC GLUCOMTR-MCNC: 176 MG/DL (ref 70–99)
GLUCOSE SERPL-MCNC: 192 MG/DL (ref 65–99)
POTASSIUM SERPL-SCNC: 4 MMOL/L (ref 3.5–5.2)
SODIUM SERPL-SCNC: 135 MMOL/L (ref 136–145)

## 2021-10-01 PROCEDURE — 25010000002 VANCOMYCIN 5 G RECONSTITUTED SOLUTION: Performed by: INTERNAL MEDICINE

## 2021-10-01 PROCEDURE — 25010000002 CEFTRIAXONE PER 250 MG: Performed by: INTERNAL MEDICINE

## 2021-10-01 PROCEDURE — 0T9B70Z DRAINAGE OF BLADDER WITH DRAINAGE DEVICE, VIA NATURAL OR ARTIFICIAL OPENING: ICD-10-PCS | Performed by: INTERNAL MEDICINE

## 2021-10-01 PROCEDURE — 80048 BASIC METABOLIC PNL TOTAL CA: CPT | Performed by: INTERNAL MEDICINE

## 2021-10-01 PROCEDURE — 94799 UNLISTED PULMONARY SVC/PX: CPT

## 2021-10-01 PROCEDURE — 99232 SBSQ HOSP IP/OBS MODERATE 35: CPT | Performed by: INTERNAL MEDICINE

## 2021-10-01 PROCEDURE — 51798 US URINE CAPACITY MEASURE: CPT

## 2021-10-01 PROCEDURE — 82962 GLUCOSE BLOOD TEST: CPT

## 2021-10-01 PROCEDURE — 51701 INSERT BLADDER CATHETER: CPT

## 2021-10-01 PROCEDURE — 25010000002 LORAZEPAM PER 2 MG: Performed by: INTERNAL MEDICINE

## 2021-10-01 PROCEDURE — 63710000001 INSULIN LISPRO (HUMAN) PER 5 UNITS: Performed by: INTERNAL MEDICINE

## 2021-10-01 RX ADMIN — TRAZODONE HYDROCHLORIDE 50 MG: 50 TABLET ORAL at 22:07

## 2021-10-01 RX ADMIN — LISINOPRIL 5 MG: 5 TABLET ORAL at 08:33

## 2021-10-01 RX ADMIN — DOCUSATE SODIUM 50MG AND SENNOSIDES 8.6MG 2 TABLET: 8.6; 5 TABLET, FILM COATED ORAL at 08:34

## 2021-10-01 RX ADMIN — MULTIPLE VITAMINS W/ MINERALS TAB 1 TABLET: TAB at 08:34

## 2021-10-01 RX ADMIN — INSULIN LISPRO 2 UNITS: 100 INJECTION, SOLUTION INTRAVENOUS; SUBCUTANEOUS at 08:34

## 2021-10-01 RX ADMIN — SODIUM CHLORIDE 75 ML/HR: 9 INJECTION, SOLUTION INTRAVENOUS at 05:43

## 2021-10-01 RX ADMIN — SODIUM CHLORIDE 75 ML/HR: 9 INJECTION, SOLUTION INTRAVENOUS at 18:25

## 2021-10-01 RX ADMIN — LORAZEPAM 1 MG: 0.5 TABLET ORAL at 07:49

## 2021-10-01 RX ADMIN — FINASTERIDE 5 MG: 5 TABLET, FILM COATED ORAL at 08:34

## 2021-10-01 RX ADMIN — VANCOMYCIN HYDROCHLORIDE 1750 MG: 5 INJECTION, POWDER, LYOPHILIZED, FOR SOLUTION INTRAVENOUS at 10:50

## 2021-10-01 RX ADMIN — FAMOTIDINE 40 MG: 20 TABLET, FILM COATED ORAL at 08:33

## 2021-10-01 RX ADMIN — ATORVASTATIN CALCIUM 10 MG: 10 TABLET, FILM COATED ORAL at 21:54

## 2021-10-01 RX ADMIN — LORAZEPAM 0.5 MG: 2 INJECTION INTRAMUSCULAR; INTRAVENOUS at 05:20

## 2021-10-01 RX ADMIN — SODIUM CHLORIDE, PRESERVATIVE FREE 10 ML: 5 INJECTION INTRAVENOUS at 21:53

## 2021-10-01 RX ADMIN — Medication 5 MG: at 21:53

## 2021-10-01 RX ADMIN — CEFTRIAXONE SODIUM 1 G: 1 INJECTION, SOLUTION INTRAVENOUS at 08:34

## 2021-10-01 RX ADMIN — LORAZEPAM 0.5 MG: 2 INJECTION INTRAMUSCULAR; INTRAVENOUS at 21:54

## 2021-10-01 RX ADMIN — INSULIN LISPRO 2 UNITS: 100 INJECTION, SOLUTION INTRAVENOUS; SUBCUTANEOUS at 12:10

## 2021-10-01 RX ADMIN — LORAZEPAM 0.5 MG: 2 INJECTION INTRAMUSCULAR; INTRAVENOUS at 00:32

## 2021-10-01 NOTE — PLAN OF CARE
Goal Outcome Evaluation: Pt confused and agitated this morning. Pulling at lines and attempting to get out of bed. CIWA score of 11. Ativan given. Pt has rested comfortably for the remainder of the day. CIWA score of 4 currently.

## 2021-10-01 NOTE — PROGRESS NOTES
[facility]   Hospitalist Progress Note  Date: 10/1/2021  Patient Name: Teto Castle  : 1940  MRN: [id number]  Date of admission: 2021      Subjective   Subjective     Chief Complaint: Chief Complaint: Syncope     Summary:  Teto Castle is a 81 y.o. male who was going to the bar to visit friends when walking in he became lightheaded dizzy, fell to the ground, witnesses state that he passed out and then vomited when he awoke however he states that he did not lose consciousness.  He denies any preceding chest pain, no shortness of breath although he does endorse chronic shortness of breath since he had Covid many months ago that has not resolved.  He denies any recent changes in medications, he does state that he has been having back pain which is typically his first sign of a urinary tract infection.  His outpatient doctor put him on an antibiotic although he does not know the name.  Of note he does have to self caths twice daily and has frequent urinary tract infections.  He presents to our emergency department where his systolic blood pressure was noted to be in the 50s, he received 1 L of fluid with greatly improved blood pressures, he is admitted for IV antibiotics, continued monitoring for hypotension and a syncopal episode.  We were asked to admit for further work-up and management of above    Interval Followup: Patient seen on this morning.  Patient RN at bedside reports patient with agitated on this morning.  CIWA score was noted to be 9 at that time.  He did receive scheduled Ativan and subsequently he is sleeping.      Review of Systems   All systems were reviewed and negative except for: Patient voices no complaints    Objective   Objective     Vitals:   Temp:  [97.3 °F (36.3 °C)-98.3 °F (36.8 °C)] 98.3 °F (36.8 °C)  Heart Rate:  [80-86] 82  Resp:  [18-20] 18  BP: (129-146)/(66-86) 135/78  Physical Exam      Constitutional: Sleeping, no acute distress              Eyes: Pupils  equal, sclerae anicteric, no conjunctival injection              HENT: NCAT, mucous membranes moist              Neck: Supple, no thyromegaly, no lymphadenopathy, trachea midline              Respiratory: Clear to auscultation bilaterally, nonlabored respirations               Cardiovascular: Cardiac S1-S2, no murmurs, rubs, or gallops              Gastrointestinal: Obese abdomen, positive bowel sounds, soft, nontender, nondistended              Musculoskeletal: No bilateral ankle edema, no clubbing or cyanosis to extremities              Psychiatric: Unable to determine              Neurologic: Sleeping unable to further assess              Skin: No rashes     Result Review    Result Review:  I have personally reviewed the results from the time of this admission to 10/1/2021 09:12 EDT and agree with these findings:  [x]  Laboratory  [x]  Microbiology  []  Radiology  [x]  EKG/Telemetry   []  Cardiology/Vascular   []  Pathology  []  Old records  [x]  Other: Medications    Assessment/Plan   Assessment / Plan     Assessment:   Syncope  Hypotension.  Resolved  Alphahemolytic Streptococcus UTI.  Possibly secondary to urinary retention and self-catheterization.  Present on arrival.  Gram-positive cocci bacteremia  Lactic acidosis.  Resolved  History of hypertension  Cirrhosis  Acute renal insufficiency  BPH  Diabetes mellitus  Hyperlipidemia  Chronic urinary retention requiring in and out cath twice daily  Hypertension     Plan:  · Patient admitted to the hospital for further work-up and management of above  · Continue patient's antihypertensives  · CT scan of the abdomen reviewed, no acute findings  · ED echo results noted.  EF 61 to 65%.  Diastolic dysfunction with grade 1 impaired relaxation.  No evidence of significant valvular dysfunction noted  · Troponin negative, NT proBNP negative  · Continue sliding scale insulin  · Blood cultures with GPC, urine cultures with alpha hemolytic strep  · Awaiting sensitivities of  urine- spoke [employer]([place]) to request these  · Continue Rocephin  · Continue vancomycin  · Continue to monitor on telemetry  · Continue BPH meds  · EKG reviewed, nonischemic  · Every 12 hours and as needed straight caths  · Trazodone for sleep  · Continue CIWA protocol including scheduled Ativan  · Regular diet     DVT prophylaxis: SCDs  GI: Pepcid  Diet: Regular  Telemetry: Reviewed by me, currently sinus rhythm rate in the 80s.    DVT prophylaxis:  Mechanical DVT prophylaxis orders are present.    CODE STATUS:   Code Status: CPR  Medical Interventions (Level of Support Prior to Arrest): Full        Electronically signed by Madeleine Saxena MD, 10/01/21, 09:12 AM EDT.

## 2021-10-01 NOTE — PLAN OF CARE
Goal Outcome Evaluation:        Outcome Summary: Magy mukherjee is aty bedside, pt constantly attempted to get out of bed, pulled out IV, confused. Pt alert and oriented to self only, unaware of time/ place/ situation. Vital signs are stable. No complaints of pain. Retaining urine, bladder scan showed 600mL, straight cath performed 600mL of urine drained. Contiunuing to monitor. Cassidy Gabehart, RN

## 2021-10-02 LAB
ANION GAP SERPL CALCULATED.3IONS-SCNC: 8.5 MMOL/L (ref 5–15)
BACTERIA SPEC AEROBE CULT: ABNORMAL
BACTERIA SPEC AEROBE CULT: ABNORMAL
BUN SERPL-MCNC: 7 MG/DL (ref 8–23)
BUN/CREAT SERPL: 7.8 (ref 7–25)
CALCIUM SPEC-SCNC: 8.6 MG/DL (ref 8.6–10.5)
CHLORIDE SERPL-SCNC: 105 MMOL/L (ref 98–107)
CO2 SERPL-SCNC: 24.5 MMOL/L (ref 22–29)
CREAT SERPL-MCNC: 0.9 MG/DL (ref 0.76–1.27)
GFR SERPL CREATININE-BSD FRML MDRD: 81 ML/MIN/1.73
GLUCOSE BLDC GLUCOMTR-MCNC: 111 MG/DL (ref 70–99)
GLUCOSE BLDC GLUCOMTR-MCNC: 128 MG/DL (ref 70–99)
GLUCOSE BLDC GLUCOMTR-MCNC: 207 MG/DL (ref 70–99)
GLUCOSE SERPL-MCNC: 119 MG/DL (ref 65–99)
GRAM STN SPEC: ABNORMAL
GRAM STN SPEC: ABNORMAL
ISOLATED FROM: ABNORMAL
ISOLATED FROM: ABNORMAL
POTASSIUM SERPL-SCNC: 3.8 MMOL/L (ref 3.5–5.2)
SODIUM SERPL-SCNC: 138 MMOL/L (ref 136–145)
VANCOMYCIN SERPL-MCNC: 12.1 MCG/ML (ref 5–40)

## 2021-10-02 PROCEDURE — 82962 GLUCOSE BLOOD TEST: CPT

## 2021-10-02 PROCEDURE — 80202 ASSAY OF VANCOMYCIN: CPT | Performed by: INTERNAL MEDICINE

## 2021-10-02 PROCEDURE — 25010000002 CEFTRIAXONE PER 250 MG: Performed by: INTERNAL MEDICINE

## 2021-10-02 PROCEDURE — 99232 SBSQ HOSP IP/OBS MODERATE 35: CPT | Performed by: INTERNAL MEDICINE

## 2021-10-02 PROCEDURE — 25010000002 ENOXAPARIN PER 10 MG: Performed by: INTERNAL MEDICINE

## 2021-10-02 PROCEDURE — 80048 BASIC METABOLIC PNL TOTAL CA: CPT | Performed by: INTERNAL MEDICINE

## 2021-10-02 PROCEDURE — 63710000001 INSULIN LISPRO (HUMAN) PER 5 UNITS: Performed by: INTERNAL MEDICINE

## 2021-10-02 RX ADMIN — ATORVASTATIN CALCIUM 10 MG: 10 TABLET, FILM COATED ORAL at 21:02

## 2021-10-02 RX ADMIN — MULTIPLE VITAMINS W/ MINERALS TAB 1 TABLET: TAB at 08:24

## 2021-10-02 RX ADMIN — TRAZODONE HYDROCHLORIDE 50 MG: 50 TABLET ORAL at 21:03

## 2021-10-02 RX ADMIN — LISINOPRIL 5 MG: 5 TABLET ORAL at 08:24

## 2021-10-02 RX ADMIN — INSULIN LISPRO 4 UNITS: 100 INJECTION, SOLUTION INTRAVENOUS; SUBCUTANEOUS at 12:11

## 2021-10-02 RX ADMIN — SODIUM CHLORIDE, PRESERVATIVE FREE 10 ML: 5 INJECTION INTRAVENOUS at 21:03

## 2021-10-02 RX ADMIN — Medication 5 MG: at 21:02

## 2021-10-02 RX ADMIN — SODIUM CHLORIDE, PRESERVATIVE FREE 10 ML: 5 INJECTION INTRAVENOUS at 08:24

## 2021-10-02 RX ADMIN — ENOXAPARIN SODIUM 40 MG: 40 INJECTION SUBCUTANEOUS at 16:47

## 2021-10-02 RX ADMIN — DOCUSATE SODIUM 50MG AND SENNOSIDES 8.6MG 2 TABLET: 8.6; 5 TABLET, FILM COATED ORAL at 21:02

## 2021-10-02 RX ADMIN — FINASTERIDE 5 MG: 5 TABLET, FILM COATED ORAL at 08:24

## 2021-10-02 RX ADMIN — FAMOTIDINE 40 MG: 20 TABLET, FILM COATED ORAL at 08:24

## 2021-10-02 RX ADMIN — CEFTRIAXONE SODIUM 1 G: 1 INJECTION, SOLUTION INTRAVENOUS at 08:24

## 2021-10-02 NOTE — PROGRESS NOTES
[facility]   Hospitalist Progress Note  Date: 10/2/2021  Patient Name: Teto Castle  : 1940  MRN: [id number]  Date of admission: 2021      Subjective   Subjective     Chief Complaint: Chief Complaint: Syncope     Summary:  Teto Castle is a 81 y.o. male who was going to the bar to visit friends when walking in he became lightheaded dizzy, fell to the ground, witnesses state that he passed out and then vomited when he awoke however he states that he did not lose consciousness.  He denies any preceding chest pain, no shortness of breath although he does endorse chronic shortness of breath since he had Covid many months ago that has not resolved.  He denies any recent changes in medications, he does state that he has been having back pain which is typically his first sign of a urinary tract infection.  His outpatient doctor put him on an antibiotic although he does not know the name.  Of note he does have to self caths twice daily and has frequent urinary tract infections.  He presents to our emergency department where his systolic blood pressure was noted to be in the 50s, he received 1 L of fluid with greatly improved blood pressures, he is admitted for IV antibiotics, continued monitoring for hypotension and a syncopal episode.  We were asked to admit for further work-up and management of above    Interval Followup: Patient seen on this morning.  Patient RN at bedside reports patient with no significant agitation noted.  Patient has not received any as needed or scheduled Ativan since 749 AM on 10/1.  Patient is without any complaints today.  Patient's daughter reports that he is much more like his self on today.    Review of Systems   All systems were reviewed and negative except for: Patient voices no complaints    Objective   Objective     Vitals:   Temp:  [97.4 °F (36.3 °C)-98.2 °F (36.8 °C)] 98.1 °F (36.7 °C)  Heart Rate:  [68-88] 73  Resp:  [16-20] 16  BP: (110-134)/(49-79)  128/61  Physical Exam      Constitutional: Sleepin awakens to voice and tactile stimulation g, no acute distress              Eyes: Pupils equal, sclerae anicteric, no conjunctival injection              HENT: NCAT, mucous membranes moist              Neck: Supple, no thyromegaly, no lymphadenopathy, trachea midline              Respiratory: Clear to auscultation bilaterally, nonlabored respirations               Cardiovascular: Cardiac S1-S2, no murmurs, rubs, or gallops              Gastrointestinal: Obese abdomen, positive bowel sounds, soft, nontender, nondistended              Musculoskeletal: No bilateral ankle edema, no clubbing or cyanosis to extremities              Psychiatric: Mood/affect appropriate                          Neurologic: Oriented x2 (person place), moving all extremities , no focal weakness.                       Skin: No rashes     Result Review    Result Review:  I have personally reviewed the results from the time of this admission to 10/2/2021 09:01 EDT and agree with these findings:  [x]  Laboratory  [x]  Microbiology  []  Radiology  [x]  EKG/Telemetry   []  Cardiology/Vascular   []  Pathology  []  Old records  [x]  Other: Medications    Assessment/Plan   Assessment / Plan     Assessment:   Syncope  Hypotension.  Resolved  Alphahemolytic Streptococcus UTI.  Possibly secondary to urinary retention and self-catheterization.  Present on arrival.  Blood cultures positive for Micrococcus luteus.  Felt to be contaminant.    Lactic acidosis.  Resolved  Cirrhosis  Acute renal insufficiency.  Resolved  BPH  Diabetes mellitus type II  Hyperlipidemia  Chronic urinary retention requiring in and out cath twice daily  Hypertension     Plan:  · Patient admitted to the hospital for further work-up and management of above  · Continue patient's antihypertensives  · CT scan of the abdomen reviewed, no acute findings  · ED echo results noted.  EF 61 to 65%.  Diastolic dysfunction with grade 1 impaired  relaxation.  No evidence of significant valvular dysfunction noted  · Troponin negative, NT proBNP negative  · Continue sliding scale insulin  · Blood cultures with micrococcus luteus (felt to be contaminant), urine cultures with alpha hemolytic strep.  Sensitivities reviewed  · Continue Rocephin.  Will most likely switch to Augmentin on discharge based on sensitivities.  · Continue to monitor on telemetry  · Continue BPH meds  · EKG reviewed, nonischemic  · Every 12 hours and as needed straight caths  · Trazodone for sleep  · Continue CIWA protocol   · Discontinue scheduled Ativan  · Regular diet  · PT/OT consult     DVT prophylaxis: SCDs  GI: Pepcid  Diet: Regular  Telemetry: Reviewed by me, currently sinus rhythm rate in the 70s.    Patient's daughter is interested in home health versus outpatient PT if needed.    DVT prophylaxis:  Mechanical DVT prophylaxis orders are present.    CODE STATUS:   Code Status: CPR  Medical Interventions (Level of Support Prior to Arrest): Full        Electronically signed by Madeleine Saxena MD, 10/02/21, 09:01 AM EDT.

## 2021-10-02 NOTE — PLAN OF CARE
Goal Outcome Evaluation:  Plan of Care Reviewed With: patient        Progress: no change  Outcome Summary: Pt  at bedside. Pt occasionally wakes up and tried to pull out lines/ get out of bed. Otherwise he has slept well. He voided in the urinal x1, will bladder scan him to see if he needs straight cathed. IVF infusing. No c/o pain. Oriented x person and time while awake. Will cont to monitor. VSS.

## 2021-10-02 NOTE — PLAN OF CARE
Goal Outcome Evaluation:  Pt has been more alert and oriented this shift. He is cooperative and participating in care. VS graciel. ZACHARY discontinued

## 2021-10-02 NOTE — PROGRESS NOTES
"Pharmacy to Dose Vancomycin Day: 3  DURATION OF THERAPY: 10-7-21    INDICATION: BACTEREMIA  GOAL AUC: 400-600 MG/L.HR    HT: 172.7 cm (68\")       10/01/21  0423      Weight: 101 kg (223 lb 8.7 oz)         Estimated Creatinine Clearance: 75.4 mL/min (by C-G formula based on SCr of 0.9 mg/dL).  HD/PD/CRRT?: No  CONTRAST ADMINISTERED? ISOVUE 9-28  I/O last 3 completed shifts:  In: 340 [P.O.:340]  Out: 1925 [Urine:1925]    WBC: 4.11 (9-29)  TMAX: AF    Microbiology Results (last 10 days)       Procedure Component Value - Date/Time    Blood Culture - Blood, Arm, Left [[id number]]  (Abnormal) Collected: 09/28/21 1834    Lab Status: Final result Specimen: Blood from Arm, Left Updated: 10/02/21 0725     Blood Culture Micrococcus luteus     Comment: Probable contaminant requires clinical correlation, susceptibility not performed unless requested by physician.    Other set was collected from same site at same time.        Isolated from Aerobic Bottle     Gram Stain Aerobic Bottle Gram positive cocci in clusters    Blood Culture - Blood, Arm, Left [[id number]]  (Abnormal) Collected: 09/28/21 1834    Lab Status: Final result Specimen: Blood from Arm, Left Updated: 10/02/21 0724     Blood Culture Micrococcus luteus     Comment: Probable contaminant requires clinical correlation, susceptibility not performed unless requested by physician.    Other set was collected from same site at same time.         Isolated from Aerobic Bottle     Gram Stain Aerobic Bottle Gram positive cocci in clusters    Blood Culture ID, PCR - Blood, Arm, Left [[id number]]  (Normal) Collected: 09/28/21 1834    Lab Status: Final result Specimen: Blood from Arm, Left Updated: 09/30/21 0426     BCID, PCR Negative by BCID PCR. Culture to Follow.    Urine Culture - Urine, Urine, Clean Catch [[id number]]  (Abnormal) Collected: 09/28/21 1501    Lab Status: Preliminary result Specimen: Urine, Clean Catch Updated: 09/30/21 1216     Urine Culture >100,000 CFU/mL " Streptococcus, Alpha Hemolytic     Comment: This organism commonly represents colonization or contamination. No further workup unless requested by provider.       Narrative:      9/30/21 MD requested workup.            Other Antimicrobial Therapy: Ceftriaxone 1000mg daily     Assessment/Plan  Lab Results   Component Value Date    VANCORANDOM 12.10 10/02/2021     Current regimen: vancomycin 1750 mg q24hr    AUC24,ss: 433 mg/L.hr  PAUC*: 65 %  Ctrough,ss: 11.9 mg/L  Pconc*: 7 %  Tox.: 7 %    Will continue same regimen for now  Labs ordered: BMP ordered for a.m.

## 2021-10-03 LAB
ANION GAP SERPL CALCULATED.3IONS-SCNC: 8.8 MMOL/L (ref 5–15)
BACTERIA SPEC AEROBE CULT: ABNORMAL
BUN SERPL-MCNC: 8 MG/DL (ref 8–23)
BUN/CREAT SERPL: 9.2 (ref 7–25)
CALCIUM SPEC-SCNC: 8.8 MG/DL (ref 8.6–10.5)
CHLORIDE SERPL-SCNC: 106 MMOL/L (ref 98–107)
CO2 SERPL-SCNC: 23.2 MMOL/L (ref 22–29)
CREAT SERPL-MCNC: 0.87 MG/DL (ref 0.76–1.27)
DEPRECATED RDW RBC AUTO: 55.4 FL (ref 37–54)
ERYTHROCYTE [DISTWIDTH] IN BLOOD BY AUTOMATED COUNT: 14.7 % (ref 12.3–15.4)
GFR SERPL CREATININE-BSD FRML MDRD: 84 ML/MIN/1.73
GLUCOSE BLDC GLUCOMTR-MCNC: 120 MG/DL (ref 70–99)
GLUCOSE BLDC GLUCOMTR-MCNC: 121 MG/DL (ref 70–99)
GLUCOSE BLDC GLUCOMTR-MCNC: 124 MG/DL (ref 70–99)
GLUCOSE BLDC GLUCOMTR-MCNC: 165 MG/DL (ref 70–99)
GLUCOSE SERPL-MCNC: 146 MG/DL (ref 65–99)
HCT VFR BLD AUTO: 29.7 % (ref 37.5–51)
HGB BLD-MCNC: 10.5 G/DL (ref 13–17.7)
MCH RBC QN AUTO: 36.1 PG (ref 26.6–33)
MCHC RBC AUTO-ENTMCNC: 35.4 G/DL (ref 31.5–35.7)
MCV RBC AUTO: 102.1 FL (ref 79–97)
PLATELET # BLD AUTO: 83 10*3/MM3 (ref 140–450)
PMV BLD AUTO: 10 FL (ref 6–12)
POTASSIUM SERPL-SCNC: 3.7 MMOL/L (ref 3.5–5.2)
QT INTERVAL: 408 MS
RBC # BLD AUTO: 2.91 10*6/MM3 (ref 4.14–5.8)
SODIUM SERPL-SCNC: 138 MMOL/L (ref 136–145)
WBC # BLD AUTO: 3.46 10*3/MM3 (ref 3.4–10.8)

## 2021-10-03 PROCEDURE — 25010000002 CEFTRIAXONE PER 250 MG: Performed by: INTERNAL MEDICINE

## 2021-10-03 PROCEDURE — 80048 BASIC METABOLIC PNL TOTAL CA: CPT | Performed by: INTERNAL MEDICINE

## 2021-10-03 PROCEDURE — 85027 COMPLETE CBC AUTOMATED: CPT | Performed by: INTERNAL MEDICINE

## 2021-10-03 PROCEDURE — 63710000001 INSULIN LISPRO (HUMAN) PER 5 UNITS: Performed by: INTERNAL MEDICINE

## 2021-10-03 PROCEDURE — 99232 SBSQ HOSP IP/OBS MODERATE 35: CPT | Performed by: INTERNAL MEDICINE

## 2021-10-03 PROCEDURE — 94799 UNLISTED PULMONARY SVC/PX: CPT

## 2021-10-03 PROCEDURE — 97530 THERAPEUTIC ACTIVITIES: CPT

## 2021-10-03 PROCEDURE — 82962 GLUCOSE BLOOD TEST: CPT

## 2021-10-03 PROCEDURE — 97165 OT EVAL LOW COMPLEX 30 MIN: CPT

## 2021-10-03 PROCEDURE — 25010000002 ENOXAPARIN PER 10 MG: Performed by: INTERNAL MEDICINE

## 2021-10-03 RX ADMIN — FAMOTIDINE 40 MG: 20 TABLET, FILM COATED ORAL at 09:10

## 2021-10-03 RX ADMIN — ENOXAPARIN SODIUM 40 MG: 40 INJECTION SUBCUTANEOUS at 17:04

## 2021-10-03 RX ADMIN — DOCUSATE SODIUM 50MG AND SENNOSIDES 8.6MG 2 TABLET: 8.6; 5 TABLET, FILM COATED ORAL at 20:09

## 2021-10-03 RX ADMIN — Medication 5 MG: at 20:10

## 2021-10-03 RX ADMIN — CEFTRIAXONE SODIUM 1 G: 1 INJECTION, SOLUTION INTRAVENOUS at 09:09

## 2021-10-03 RX ADMIN — LISINOPRIL 5 MG: 5 TABLET ORAL at 09:10

## 2021-10-03 RX ADMIN — INSULIN LISPRO 2 UNITS: 100 INJECTION, SOLUTION INTRAVENOUS; SUBCUTANEOUS at 12:46

## 2021-10-03 RX ADMIN — DOCUSATE SODIUM 50MG AND SENNOSIDES 8.6MG 2 TABLET: 8.6; 5 TABLET, FILM COATED ORAL at 09:11

## 2021-10-03 RX ADMIN — MULTIPLE VITAMINS W/ MINERALS TAB 1 TABLET: TAB at 09:10

## 2021-10-03 RX ADMIN — FINASTERIDE 5 MG: 5 TABLET, FILM COATED ORAL at 09:10

## 2021-10-03 RX ADMIN — ATORVASTATIN CALCIUM 10 MG: 10 TABLET, FILM COATED ORAL at 20:10

## 2021-10-03 RX ADMIN — SODIUM CHLORIDE, PRESERVATIVE FREE 10 ML: 5 INJECTION INTRAVENOUS at 09:13

## 2021-10-03 RX ADMIN — TRAZODONE HYDROCHLORIDE 50 MG: 50 TABLET ORAL at 20:10

## 2021-10-03 RX ADMIN — SODIUM CHLORIDE, PRESERVATIVE FREE 10 ML: 5 INJECTION INTRAVENOUS at 20:10

## 2021-10-03 NOTE — PLAN OF CARE
Goal Outcome Evaluation:  Plan of Care Reviewed With: patient           Outcome Summary: Pt has slept all night. No confusion. Pt doing well. VSS. Lan Velez RN

## 2021-10-03 NOTE — PROGRESS NOTES
[facility]   Hospitalist Progress Note  Date: 10/3/2021  Patient Name: Teto Castle  : 1940  MRN: [id number]  Date of admission: 2021      Subjective   Subjective     Chief Complaint: Chief Complaint: Syncope     Summary:  Teto Castle is a 81 y.o. male who was going to the bar to visit friends when walking in he became lightheaded dizzy, fell to the ground, witnesses state that he passed out and then vomited when he awoke however he states that he did not lose consciousness.  He denies any preceding chest pain, no shortness of breath although he does endorse chronic shortness of breath since he had Covid many months ago that has not resolved.  He denies any recent changes in medications, he does state that he has been having back pain which is typically his first sign of a urinary tract infection.  His outpatient doctor put him on an antibiotic although he does not know the name.  Of note he does have to self caths twice daily and has frequent urinary tract infections.  He presents to our emergency department where his systolic blood pressure was noted to be in the 50s, he received 1 L of fluid with greatly improved blood pressures, he is admitted for IV antibiotics, continued monitoring for hypotension and a syncopal episode.  We were asked to admit for further work-up and management of above    Interval Followup: Patient seen on this morning.  Patient RN at bedside reports patient with no significant agitation noted.  Patient is without any complaints today.      Review of Systems   All systems were reviewed and negative except for: Patient voices no complaints    Objective   Objective     Vitals:   Temp:  [97.5 °F (36.4 °C)-98.2 °F (36.8 °C)] 98.1 °F (36.7 °C)  Heart Rate:  [67-76] 69  Resp:  [16-18] 16  BP: (102-131)/(52-90) 112/57  Physical Exam      Constitutional: Awake and alert sitting up in bed, no acute distress              Eyes: Pupils equal, sclerae anicteric, no  conjunctival injection              HENT: NCAT, mucous membranes moist              Neck: Supple, no thyromegaly, no lymphadenopathy, trachea midline              Respiratory: Clear to auscultation bilaterally, nonlabored respirations               Cardiovascular: Cardiac S1-S2, no murmurs, rubs, or gallops              Gastrointestinal: Obese abdomen, positive bowel sounds, soft, nontender, nondistended              Musculoskeletal: No bilateral ankle edema, no clubbing or cyanosis to extremities              Psychiatric: Mood/affect appropriate                          Neurologic: Oriented x3(person place), moving all extremities , no focal weakness.                       Skin: No rashes     Result Review    Result Review:  I have personally reviewed the results from the time of this admission to 10/3/2021 19:38 EDT and agree with these findings:  [x]  Laboratory  [x]  Microbiology  []  Radiology  [x]  EKG/Telemetry   []  Cardiology/Vascular   []  Pathology  []  Old records  [x]  Other: Medications    Assessment/Plan   Assessment / Plan     Assessment:   Syncope  Hypotension.  Resolved  Alphahemolytic Streptococcus UTI.  Possibly secondary to urinary retention and self-catheterization.  Present on arrival.  Blood cultures positive for Micrococcus luteus.  Felt to be contaminant.    Lactic acidosis.  Resolved  Cirrhosis of the liver  Acute renal insufficiency.  Resolved  BPH  Diabetes mellitus type II  Hyperlipidemia  Chronic urinary retention requiring in and out cath twice daily  Hypertension     Plan:  · Patient admitted to the hospital for further work-up and management of above  · Continue patient's antihypertensives  · CT scan of the abdomen reviewed, no acute findings  · ED echo results noted.  EF 61 to 65%.  Diastolic dysfunction with grade 1 impaired relaxation.  No evidence of significant valvular dysfunction noted  · Troponin negative, NT proBNP negative  · Continue sliding scale insulin  · Blood  cultures with micrococcus luteus (felt to be contaminant), urine cultures with alpha hemolytic strep.  Sensitivities reviewed  · Continue Rocephin.  Will most likely switch to Augmentin on discharge based on sensitivities.  · Continue to monitor on telemetry  · Continue BPH meds  · EKG reviewed, nonischemic  · Every 12 hours and as needed straight caths  · Trazodone for sleep  · Continue CIWA protocol   · Discontinue scheduled Ativan  · Regular diet  · PT/OT consult     DVT prophylaxis: SCDs  GI: Pepcid  Diet: Regular  Telemetry: Reviewed by me, currently sinus rhythm rate in the 70s.    Patient's daughter is interested in home health versus outpatient PT if needed.  Awaiting PT evaluation for recommendations.  Hopefully will be able to discharge home within the next 24 hours.    DVT prophylaxis:  Medical and mechanical DVT prophylaxis orders are present.    CODE STATUS:   Code Status: CPR  Medical Interventions (Level of Support Prior to Arrest): Full        Electronically signed by Madeleine Saxena MD, 10/02/21, 09:01 AM EDT.

## 2021-10-03 NOTE — THERAPY EVALUATION
Patient Name: Teto Castle  : 1940    MRN: [id number]                              Today's Date: 10/3/2021       Admit Date: 2021    Visit Dx:     ICD-10-CM ICD-9-CM   1. Acute UTI  N39.0 599.0   2. Decreased activities of daily living (ADL)  Z78.9 V49.89     Patient Active Problem List   Diagnosis   • BPH with obstruction/lower urinary tract symptoms   • Essential hypertension   • Type 2 diabetes mellitus without complication (HCC)   • Thrombocytopenia (HCC)   • B12 deficiency   • Syncope and collapse     Past Medical History:   Diagnosis Date   • Aneurysm of aorta (CMS/HCC)    • Anxiety and depression    • Arthritis    • BPH (benign prostatic hyperplasia)    • Diabetes mellitus (CMS/HCC)    • Hard of hearing    • History of frequent urinary tract infections    • History of MI (myocardial infarction)     STATES WAS TOLD HE HAD IN PAST, NEVER SAW CARDIOLOGY   • Hypertension    • Pulmonary nodules      Past Surgical History:   Procedure Laterality Date   • CATARACT EXTRACTION, BILATERAL     • COLONOSCOPY  approx     normal per patient   • CYSTOSCOPY TRANSURETHRAL RESECTION OF PROSTATE N/A 9/10/2018    Procedure: TRANSURETHRAL RESECTION OF PROSTATE;  Surgeon: Art Dickerson MD;  Location: [facility] OR;  Service: Urology   • ENDOSCOPIC FUNCTIONAL SINUS SURGERY (FESS) N/A 5/15/2019    Procedure: ETHMOIDECTOMY,LEFT INTERNASAL  ANTROSTOMY;  Surgeon: Mark Linda MD;  Location: [facility];  Service: ENT   • FRACTURE SURGERY      LT ARM   • HEMORRHOIDECTOMY     • SEPTOPLASTY N/A 5/15/2019    Procedure: NASAL SEPTOPLASTY,;  Surgeon: Mark Linda MD;  Location: [facility] OR [facility] [place];  Service: ENT     General Information     Row Name 10/03/21 0844 10/03/21 0812       OT Time and Intention    Document Type  therapy note (daily note)  -AC  evaluation  -AC    Mode of Treatment  occupational therapy;individual therapy  -AC  individual therapy;occupational therapy  -AC    Row Name 10/03/21  0844 10/03/21 0812       General Information    Patient Profile Reviewed  yes  -AC  yes  -AC    Prior Level of Function  --  independent: pt. reports mostly (I) with adls however his wife puts on his socks. He goes to his daughters house for a shower as she has a handicap bathroom but he stands to bathe. He reports he has a cane but did not use. pt. drives.  -AC    Existing Precautions/Restrictions  fall  -AC  fall  -AC    Barriers to Rehab  --  none identified  -AC    Row Name 10/03/21 0812          Occupational Profile    Reason for Services/Referral (Occupational Profile)  Pt. is a 81 year old male admitted for the above diagnosis. Pt. referred to OT services to assess independence with ADLs and adl transfers/fx'l mobility. No previous OT services for current condition.  -AC     Row Name 10/03/21 0812          Living Environment    Lives With  spouse  -     Row Name 10/03/21 0844 10/03/21 0812       Cognition    Orientation Status (Cognition)  oriented x 3  -AC  oriented x 3  -AC    Row Name 10/03/21 0844 10/03/21 0812       Safety Issues, Functional Mobility    Safety Issues Affecting Function (Mobility)  --  judgment  -AC    Impairments Affecting Function (Mobility)  balance;endurance/activity tolerance  -AC  balance;endurance/activity tolerance  -AC      User Key  (r) = Recorded By, (t) = Taken By, (c) = Cosigned By    Initials Name Provider Type    AC Nishi Yang, OT Occupational Therapist          Mobility/ADL's     Row Name 10/03/21 0816          Bed Mobility    Bed Mobility  bed mobility (all) activities  -     All Activities, [place] (Bed Mobility)  standby assist  -AC     Row Name 10/03/21 0845 10/03/21 0816       Transfers    Transfers  --  sit-stand transfer  -AC    Sit-Stand [place] (Transfers)  standby assist;verbal cues  -  standby assist  -AC    Row Name 10/03/21 0845 10/03/21 0816       Functional Mobility    Functional Mobility- Ind. Level  verbal cues required;contact guard  assist  -AC  verbal cues required;contact guard assist  -AC    Functional Mobility- Comment  pt. was CGA for multiple steps throughout the room without AD with increased cues for safety.  -AC  pt. was CGA for multiple steps throughout the room with increase cues for safety.  -    Row Name 10/03/21 0816          Activities of Daily Living    BADL Assessment/Intervention  -- pt. is setup for upper body dressing/bathing/grooming. Patient requires CGA/min A for lower body bathing/dressing, CGA toileting  -AC       User Key  (r) = Recorded By, (t) = Taken By, (c) = Cosigned By    Initials Name Provider Type    AC Nishi Yang OT Occupational Therapist        Obj/Interventions     Row Name 10/03/21 0820          Sensory Assessment (Somatosensory)    Sensory Assessment (Somatosensory)  sensation intact  -     Row Name 10/03/21 0820          Vision Assessment/Intervention    Visual Impairment/Limitations  WFL  -AC     Row Name 10/03/21 0820          Range of Motion Comprehensive    General Range of Motion  bilateral upper extremity ROM WNL  -AC     Row Name 10/03/21 0820          Strength Comprehensive (MMT)    General Manual Muscle Testing (MMT) Assessment  no strength deficits identified  -AC     Row Name 10/03/21 0820          Motor Skills    Motor Skills  coordination;functional endurance  -AC     Coordination  WNL  -AC     Functional Endurance  fair plus  -AC     Row Name 10/03/21 0820          Balance    Balance Assessment  standing dynamic balance  -AC     Dynamic Standing Balance  mild impairment  -AC       User Key  (r) = Recorded By, (t) = Taken By, (c) = Cosigned By    Initials Name Provider Type    Nishi Julio OT Occupational Therapist        Goals/Plan     Row Name 10/03/21 0836          Bed Mobility Goal 1 (OT)    Activity/Assistive Device (Bed Mobility Goal 1, OT)  bed mobility activities, all  -AC     [place] Level/Cues Needed (Bed Mobility Goal 1, OT)  modified independence  -AC     Time  Frame (Bed Mobility Goal 1, OT)  long term goal (LTG);10 days  -     Row Name 10/03/21 0836          Transfer Goal 1 (OT)    Activity/Assistive Device (Transfer Goal 1, OT)  transfers, all  -AC     [place] Level/Cues Needed (Transfer Goal 1, OT)  modified independence  -AC     Time Frame (Transfer Goal 1, OT)  long term goal (LTG);10 days  -     Row Name 10/03/21 0836          Bathing Goal 1 (OT)    Activity/Device (Bathing Goal 1, OT)  bathing skills, all  -AC     [place] Level/Cues Needed (Bathing Goal 1, OT)  modified independence  -AC     Time Frame (Bathing Goal 1, OT)  long term goal (LTG);10 days  -     Row Name 10/03/21 0836          Dressing Goal 1 (OT)    Activity/Device (Dressing Goal 1, OT)  dressing skills, all  -AC     [place]/Cues Needed (Dressing Goal 1, OT)  modified independence  -AC     Time Frame (Dressing Goal 1, OT)  long term goal (LTG);10 days  -     Row Name 10/03/21 0836          Toileting Goal 1 (OT)    Activity/Device (Toileting Goal 1, OT)  toileting skills, all  -AC     [place] Level/Cues Needed (Toileting Goal 1, OT)  modified independence  -AC     Time Frame (Toileting Goal 1, OT)  long term goal (LTG);10 days  -     Row Name 10/03/21 0836          Grooming Goal 1 (OT)    Activity/Device (Grooming Goal 1, OT)  grooming skills, all  -AC     [place] (Grooming Goal 1, OT)  modified independence  -AC     Time Frame (Grooming Goal 1, OT)  long term goal (LTG);10 days  -     Row Name 10/03/21 0836          Self-Feeding Goal 1 (OT)    Activity/Device (Self-Feeding Goal 1, OT)  self-feeding skills, all  -AC     [place] Level/Cues Needed (Self-Feeding Goal 1, OT)  modified independence  -AC     Time Frame (Self-Feeding Goal 1, OT)  long term goal (LTG);10 days  -     Row Name 10/03/21 0836          Therapy Assessment/Plan (OT)    Planned Therapy Interventions (OT)  activity tolerance training;BADL retraining;functional balance  retraining;occupation/activity based interventions;patient/caregiver education/training;transfer/mobility retraining;ROM/therapeutic exercise  -       User Key  (r) = Recorded By, (t) = Taken By, (c) = Cosigned By    Initials Name Provider Type    Nishi Julio OT Occupational Therapist        Clinical Impression     Row Name 10/03/21 0835          Pain Assessment    Additional Documentation  Pain Scale: FACES Pre/Post-Treatment (Group)  -     Row Name 10/03/21 0835          Pain Scale: FACES Pre/Post-Treatment    Pain: FACES Scale, Pretreatment  0-->no hurt  -AC     Posttreatment Pain Rating  0-->no hurt  -AC     Row Name 10/03/21 0835          Plan of Care Review    Plan of Care Reviewed With  patient  -AC     Progress  no change  -     Outcome Summary  Patient presents with limitations that impede his/her ability to perform ADLS. The skills of a therapist are necessary to maximize independence with ADLs.  -     Row Name 10/03/21 0835          Therapy Assessment/Plan (OT)    Patient/Family Therapy Goal Statement (OT)  Maximize independence with ADLs  -     Rehab Potential (OT)  good, to achieve stated therapy goals  -     Criteria for Skilled Therapeutic Interventions Met (OT)  yes;meets criteria;skilled treatment is necessary  -     Therapy Frequency (OT)  5 times/wk  -     Row Name 10/03/21 0835          Therapy Plan Review/Discharge Plan (OT)    Anticipated Discharge Disposition (OT)  skilled nursing facility  -       User Key  (r) = Recorded By, (t) = Taken By, (c) = Cosigned By    Initials Name Provider Type    Nishi Julio OT Occupational Therapist        Outcome Measures     Row Name 10/03/21 0808          How much help from another is currently needed...    Putting on and taking off regular lower body clothing?  3  -AC     Bathing (including washing, rinsing, and drying)  3  -AC     Toileting (which includes using toilet bed pan or urinal)  3  -AC     Putting on and taking off  regular upper body clothing  4  -AC     Taking care of personal grooming (such as brushing teeth)  4  -AC     Eating meals  4  -AC     AM-PAC 6 Clicks Score (OT)  21  -AC     Row Name 10/03/21 0839          Functional Assessment    Outcome Measure Options  AM-PAC 6 Clicks Daily Activity (OT);Optimal Instrument  -AC     Row Name 10/03/21 0839          Optimal Instrument    Optimal Instrument  Optimal - 3  -AC     Bending/Stooping  2  -AC     Standing  2  -AC     Reaching  1  -AC     From the list, choose the 3 activities you would most like to be able to do without any difficulty  Bending/stooping;Standing;Reaching  -AC     Total Score Optimal - 3  5  -AC       User Key  (r) = Recorded By, (t) = Taken By, (c) = Cosigned By    Initials Name Provider Type    Nishi Julio OT Occupational Therapist          Occupational Therapy Education                 Title: PT OT SLP Therapies (Done)     Topic: Occupational Therapy (Done)     Point: ADL training (Done)     Description:   Instruct learner(s) on proper safety adaptation and remediation techniques during self care or transfers.   Instruct in proper use of assistive devices.              Learning Progress Summary           Patient Acceptance, E, VU by  at 10/3/2021 0840                   Point: Home exercise program (Done)     Description:   Instruct learner(s) on appropriate technique for monitoring, assisting and/or progressing therapeutic exercises/activities.              Learning Progress Summary           Patient Acceptance, E, VU by  at 10/3/2021 0840                   Point: Precautions (Done)     Description:   Instruct learner(s) on prescribed precautions during self-care and functional transfers.              Learning Progress Summary           Patient Acceptance, E, VU by  at 10/3/2021 0840                   Point: Body mechanics (Done)     Description:   Instruct learner(s) on proper positioning and spine alignment during self-care, functional  mobility activities and/or exercises.              Learning Progress Summary           Patient Acceptance, E, VU by  at 10/3/2021 0840                               User Key     Initials Effective Dates Name Provider Type Discipline     06/16/21 -  Nishi Yang OT Occupational Therapist OT              OT Recommendation and Plan  Planned Therapy Interventions (OT): activity tolerance training, BADL retraining, functional balance retraining, occupation/activity based interventions, patient/caregiver education/training, transfer/mobility retraining, ROM/therapeutic exercise  Therapy Frequency (OT): 5 times/wk  Plan of Care Review  Plan of Care Reviewed With: patient  Progress: no change  Outcome Summary: Patient presents with limitations that impede his/her ability to perform ADLS. The skills of a therapist are necessary to maximize independence with ADLs.     Time Calculation:   Time Calculation- OT     Row Name 10/03/21 0842             Time Calculation- OT    OT Received On  10/03/21  -      OT Goal Re-Cert Due Date  10/12/21  -         Timed Charges    66879 - OT Therapeutic Activity Minutes  10  -AC         Untimed Charges    OT Eval/Re-eval Minutes  25  -AC         Total Minutes    Timed Charges Total Minutes  10  -AC      Untimed Charges Total Minutes  25  -AC       Total Minutes  35  -AC        User Key  (r) = Recorded By, (t) = Taken By, (c) = Cosigned By    Initials Name Provider Type     Nishi Yang OT Occupational Therapist        Therapy Charges for Today     Code Description Service Date Service Provider Modifiers Qty    87715832957  OT THERAPEUTIC ACT EA 15 MIN 10/3/2021 Nishi Yang OT GO 1    07982897909 HC OT EVAL LOW COMPLEXITY 2 10/3/2021 Nishi Yang OT GO 1               Nishi Yang OT  10/3/2021

## 2021-10-03 NOTE — PLAN OF CARE
Goal Outcome Evaluation:  Plan of Care Reviewed With: patient        Progress: no change  Outcome Summary: No acute changes with patient. Awaiting PT evaluation for possible discharge tomorrow. VSS. COntinue to monitor.  Kathryn De La Rosa RN

## 2021-10-04 VITALS
WEIGHT: 218.7 LBS | RESPIRATION RATE: 17 BRPM | TEMPERATURE: 97.3 F | BODY MASS INDEX: 33.15 KG/M2 | DIASTOLIC BLOOD PRESSURE: 47 MMHG | HEIGHT: 68 IN | HEART RATE: 67 BPM | SYSTOLIC BLOOD PRESSURE: 103 MMHG | OXYGEN SATURATION: 98 %

## 2021-10-04 LAB
ANION GAP SERPL CALCULATED.3IONS-SCNC: 10 MMOL/L (ref 5–15)
BUN SERPL-MCNC: 8 MG/DL (ref 8–23)
BUN/CREAT SERPL: 8.2 (ref 7–25)
CALCIUM SPEC-SCNC: 9 MG/DL (ref 8.6–10.5)
CHLORIDE SERPL-SCNC: 106 MMOL/L (ref 98–107)
CO2 SERPL-SCNC: 24 MMOL/L (ref 22–29)
CREAT SERPL-MCNC: 0.97 MG/DL (ref 0.76–1.27)
GFR SERPL CREATININE-BSD FRML MDRD: 74 ML/MIN/1.73
GLUCOSE BLDC GLUCOMTR-MCNC: 125 MG/DL (ref 70–99)
GLUCOSE BLDC GLUCOMTR-MCNC: 172 MG/DL (ref 70–99)
GLUCOSE SERPL-MCNC: 126 MG/DL (ref 65–99)
POTASSIUM SERPL-SCNC: 3.9 MMOL/L (ref 3.5–5.2)
SODIUM SERPL-SCNC: 140 MMOL/L (ref 136–145)

## 2021-10-04 PROCEDURE — 94799 UNLISTED PULMONARY SVC/PX: CPT

## 2021-10-04 PROCEDURE — 94760 N-INVAS EAR/PLS OXIMETRY 1: CPT

## 2021-10-04 PROCEDURE — 97161 PT EVAL LOW COMPLEX 20 MIN: CPT

## 2021-10-04 PROCEDURE — 80048 BASIC METABOLIC PNL TOTAL CA: CPT | Performed by: INTERNAL MEDICINE

## 2021-10-04 PROCEDURE — 25010000002 CEFTRIAXONE PER 250 MG: Performed by: INTERNAL MEDICINE

## 2021-10-04 PROCEDURE — 63710000001 INSULIN LISPRO (HUMAN) PER 5 UNITS: Performed by: INTERNAL MEDICINE

## 2021-10-04 PROCEDURE — 82962 GLUCOSE BLOOD TEST: CPT

## 2021-10-04 PROCEDURE — 99239 HOSP IP/OBS DSCHRG MGMT >30: CPT | Performed by: INTERNAL MEDICINE

## 2021-10-04 RX ADMIN — MULTIPLE VITAMINS W/ MINERALS TAB 1 TABLET: TAB at 09:01

## 2021-10-04 RX ADMIN — FINASTERIDE 5 MG: 5 TABLET, FILM COATED ORAL at 09:01

## 2021-10-04 RX ADMIN — INSULIN LISPRO 2 UNITS: 100 INJECTION, SOLUTION INTRAVENOUS; SUBCUTANEOUS at 12:36

## 2021-10-04 RX ADMIN — LISINOPRIL 5 MG: 5 TABLET ORAL at 09:01

## 2021-10-04 RX ADMIN — SODIUM CHLORIDE, PRESERVATIVE FREE 10 ML: 5 INJECTION INTRAVENOUS at 09:02

## 2021-10-04 RX ADMIN — CEFTRIAXONE SODIUM 1 G: 1 INJECTION, SOLUTION INTRAVENOUS at 08:59

## 2021-10-04 RX ADMIN — FAMOTIDINE 40 MG: 20 TABLET, FILM COATED ORAL at 09:01

## 2021-10-04 RX ADMIN — DOCUSATE SODIUM 50MG AND SENNOSIDES 8.6MG 2 TABLET: 8.6; 5 TABLET, FILM COATED ORAL at 09:01

## 2021-10-04 NOTE — THERAPY EVALUATION
Acute Care - Physical Therapy Initial Evaluation  JUWAN New     Patient Name: Teto Castle  : 1940  MRN: [id number]  Today's Date: 10/4/2021   Admit date: 2021     Referring Physician: Madeleine Saxena, *     Surgery Date:* No surgery found *              Visit Dx:     ICD-10-CM ICD-9-CM   1. Acute UTI  N39.0 599.0   2. Decreased activities of daily living (ADL)  Z78.9 V49.89   3. Difficulty walking  R26.2 719.7     Patient Active Problem List   Diagnosis   • BPH with obstruction/lower urinary tract symptoms   • Essential hypertension   • Type 2 diabetes mellitus without complication (HCC)   • Thrombocytopenia (HCC)   • B12 deficiency   • Syncope and collapse     Past Medical History:   Diagnosis Date   • Aneurysm of aorta (CMS/HCC)    • Anxiety and depression    • Arthritis    • BPH (benign prostatic hyperplasia)    • Diabetes mellitus (CMS/HCC)    • Hard of hearing    • History of frequent urinary tract infections    • History of MI (myocardial infarction)     STATES WAS TOLD HE HAD IN PAST, NEVER SAW CARDIOLOGY   • Hypertension    • Pulmonary nodules      Past Surgical History:   Procedure Laterality Date   • CATARACT EXTRACTION, BILATERAL     • COLONOSCOPY  approx     normal per patient   • CYSTOSCOPY TRANSURETHRAL RESECTION OF PROSTATE N/A 9/10/2018    Procedure: TRANSURETHRAL RESECTION OF PROSTATE;  Surgeon: Art Dickerson MD;  Location: [facility];  Service: Urology   • ENDOSCOPIC FUNCTIONAL SINUS SURGERY (FESS) N/A 5/15/2019    Procedure: ETHMOIDECTOMY,LEFT INTERNASAL  ANTROSTOMY;  Surgeon: Mark Linda MD;  [place] [facility] [place] [facility]  Service: ENT   • FRACTURE SURGERY      LT ARM   • HEMORRHOIDECTOMY     • SEPTOPLASTY N/A 5/15/2019    Procedure: NASAL SEPTOPLASTY,;  Surgeon: Mark Linda MD;  [place] [facility]  Service: ENT        PT Assessment (last 12 hours)      PT Evaluation and Treatment     Row Name 10/04/21 1100          Physical Therapy Time  and Intention    Subjective Information  no complaints  (Pended)   -KM     Document Type  evaluation  (Pended)   -KM     Mode of Treatment  individual therapy;physical therapy  (Pended)   -KM     Patient Effort  good  (Pended)   -KM     Symptoms Noted During/After Treatment  none  (Pended)   -KM     Row Name 10/04/21 1100          General Information    Patient Observations  alert;cooperative;agree to therapy  (Pended)   -KM     Prior Level of Function  independent:;all household mobility  (Pended)   -KM     Equipment Currently Used at Home  none  (Pended)   -KM     Existing Precautions/Restrictions  no known precautions/restrictions  (Pended)   -KM     Row Name 10/04/21 1100          Living Environment    Current Living Arrangements  home/apartment/condo  (Pended)   -KM     Lives With  spouse  (Pended)   -KM     Row Name 10/04/21 1100          Range of Motion (ROM)    Range of Motion  bilateral lower extremities;ROM is WFL  (Pended)   -KM     Row Name 10/04/21 1100          Strength (Manual Muscle Testing)    Strength (Manual Muscle Testing)  bilateral lower extremities;strength is WNL  (Pended)  5/5  -KM     Row Name 10/04/21 1100          Bed Mobility    Bed Mobility  supine-sit  (Pended)   -KM     Supine-Sit [place] (Bed Mobility)  supervision  (Pended)   -KM     Row Name 10/04/21 1100          Transfers    Transfers  sit-stand transfer  (Pended)   -KM     Sit-Stand [place] (Transfers)  standby assist  (Pended)   -KM     Row Name 10/04/21 1100          Gait/Stairs (Locomotion)    Gait/Stairs Locomotion  gait/ambulation independence  (Pended)   -KM     [place] Level (Gait)  standby assist  (Pended)   -KM     Distance in Feet (Gait)  300  (Pended)   -KM     Row Name 10/04/21 1100          Balance    Balance Assessment  standing dynamic balance  (Pended)   -KM     Dynamic Standing Balance  WFL  (Pended)   -KM     Row Name             Wound 05/15/19 1044 Other (See comments) nose incision    Wound -  Properties Group Placement Date: 05/15/19  -IK Placement Time: 1044  -IK Side: Other (See comments)  -IK Location: nose  -IK Type: incision  -IK    Retired Wound - Properties Group Date first assessed: 05/15/19  -IK Time first assessed: 1044  -IK Side: Other (See comments)  -IK Location: nose  -IK Type: incision  -IK    Row Name 10/04/21 1100          Plan of Care Review    Plan of Care Reviewed With  patient  (Pended)   -     Outcome Summary  Pt demonstrates functional ROM, strength, bed mobility, transfers, and ambulation. Skilled PT services are not necessary at this time.  (Pended)   -     Row Name 10/04/21 1100          Therapy Assessment/Plan (PT)    Criteria for Skilled Interventions Met (PT)  no problems identified which require skilled intervention  (Pended)   -     Row Name 10/04/21 1100          PT Evaluation Complexity    History, PT Evaluation Complexity  1-2 personal factors and/or comorbidities  (Pended)   -KM     Examination of Body Systems (PT Eval Complexity)  total of 4 or more elements  (Pended)   -KM     Clinical Presentation (PT Evaluation Complexity)  stable  (Pended)   -KM     Clinical Decision Making (PT Evaluation Complexity)  low complexity  (Pended)   -KM     Overall Complexity (PT Evaluation Complexity)  low complexity  (Pended)   -KM       User Key  (r) = Recorded By, (t) = Taken By, (c) = Cosigned By    Initials Name Provider Type    Marysol Ding, RN Registered Nurse    Jeffry Booth, PT Student PT Student        Physical Therapy Education                 Title: PT OT SLP Therapies (Done)     Topic: Physical Therapy (Done)     Point: Mobility training (Done)     Learning Progress Summary           Patient Acceptance, E,TB, VU by ROSALVA at 10/4/2021 1150                   Point: Home exercise program (Done)     Learning Progress Summary           Patient Acceptance, E,TB, VU by ROSALVA at 10/4/2021 1150                   Point: Body mechanics (Done)     Learning Progress Summary            Patient Acceptance, E,TB, VU by  at 10/4/2021 1150                   Point: Precautions (Done)     Learning Progress Summary           Patient Acceptance, E,TB, VU by  at 10/4/2021 1150                               User Key     Initials Effective Dates Name Provider Type Discipline     08/19/21 -  Jeffry Clayton, PT Student PT Student PT              PT Recommendation and Plan  Anticipated Discharge Disposition (PT): (P) home  Therapy Frequency (PT): (P) evaluation only  Plan of Care Reviewed With: (P) patient  Outcome Summary: (P) Pt demonstrates functional ROM, strength, bed mobility, transfers, and ambulation. Skilled PT services are not necessary at this time.  Outcome Measures     Row Name 10/04/21 1100             How much help from another person do you currently need...    Turning from your back to your side while in flat bed without using bedrails?  4  (Pended)   -KM      Moving from lying on back to sitting on the side of a flat bed without bedrails?  4  (Pended)   -KM      Moving to and from a bed to a chair (including a wheelchair)?  4  (Pended)   -KM      Standing up from a chair using your arms (e.g., wheelchair, bedside chair)?  4  (Pended)   -KM      Climbing 3-5 steps with a railing?  4  (Pended)   -KM      To walk in hospital room?  4  (Pended)   -KM      AM-PAC 6 Clicks Score (PT)  24  (Pended)   -KM         Functional Assessment    Outcome Measure Options  AM-PAC 6 Clicks Basic Mobility (PT)  (Pended)   -KM        User Key  (r) = Recorded By, (t) = Taken By, (c) = Cosigned By    Initials Name Provider Type    Jeffry Booth, PT Student PT Student           Time Calculation:   PT Charges     Row Name 10/04/21 1143             Time Calculation    PT Received On  10/04/21  (Pended)   -KM         Untimed Charges    PT Eval/Re-eval Minutes  25  (Pended)   -KM         Total Minutes    Untimed Charges Total Minutes  25  (Pended)   -KM       Total Minutes  25  (Pended)   -KM        User Key   (r) = Recorded By, (t) = Taken By, (c) = Cosigned By    Initials Name Provider Type     Jeffry Clayton, PT Student PT Student        Therapy Charges for Today     Code Description Service Date Service Provider Modifiers Qty    77743207580 HC PT EVAL LOW COMPLEXITY 2 10/4/2021 Jeffry Clayton, PT Student GP 1          PT G-Codes  Outcome Measure Options: (P) AM-PAC 6 Clicks Basic Mobility (PT)  AM-PAC 6 Clicks Score (PT): (P) 24  AM-PAC 6 Clicks Score (OT): 21    Jeffry Clayton PT Student  10/4/2021

## 2021-10-04 NOTE — CASE MANAGEMENT/SOCIAL WORK
[facility]   Social Work Discharge Plan    Patient Name: Teto Castle  : 1940  MRN: [id number]  Attending Physician:  Madeleine Saxena, *  Date of admission: 2021      /Case Management Discharge Plan     Home Independently:    Pt will discharge home today.    Op therapy Appointment scheduled with PTA in [place].    PTA OP Therapy    [address] [place] [address], [facility]  Ph# [phone]  Fax# [phone]    10/7/21 @ 10:30 am  Pt will need to arrive at 10:20 for paperwork    Pt will need to bring Photo ID and Ins cards

## 2021-10-04 NOTE — DISCHARGE SUMMARY
[facility]         HOSPITALIST  DISCHARGE SUMMARY    Patient Name: Teto Castle  : 1940  MRN: [id number]    Date of Admission: 2021  Date of Discharge: 10/4/2021  Primary Care Physician: Cam Dickey MD    Consults       Date and Time Order Name Status Description    2021  6:11 PM Inpatient Hospitalist Consult Completed             Active and Resolved Hospital Problems:  Active Hospital Problems    Diagnosis POA    Syncope and collapse [R55] Yes   Hypotension.  Resolved  Alphahemolytic Streptococcus UTI.  Possibly secondary to urinary retention and self-catheterization.  Present on arrival.  Blood cultures positive for Micrococcus luteus.  Felt to be contaminant.    Lactic acidosis.  Resolved  Cirrhosis of the liver  Acute renal insufficiency.    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       Hospital Course     Hospital Course:  Teto Castle is a 81 y.o. male who was going to the bar to visit friends when walking in he became lightheaded dizzy, fell to the ground, witnesses state that he passed out and then vomited when he awoke however he states that he did not lose consciousness.  He denies any preceding chest pain, no shortness of breath although he does endorse chronic shortness of breath since he had Covid many months ago that has not resolved.  He denies any recent changes in medications, he does state that he has been having back pain which is typically his first sign of a urinary tract infection.  His outpatient doctor put him on an antibiotic although he does not know the name.  Of note he does have to self caths twice daily and has frequent urinary tract infections.  He presents to our emergency department where his systolic  blood pressure was noted to be in the 50s, he received 1 L of fluid with greatly improved blood pressures, he is admitted for IV antibiotics, continued monitoring for hypotension and a syncopal episode.  We were asked to admit for further work-up and management of above.    Patient has been to the hospital service.  He was initiated on antibiotic therapy as well as fluid resuscitative measures.  Patient's renal function improved with these interventions.  He was noted to have some confusion during the hospitalization which was felt to have episodes of confusion noted during his hospitalization he does have a history of alcohol abuse and was monitored with [facility] protocol.  He was initiated on scheduled Ativan and subsequently the patient agitation improved and on the day of discharge he was at his baseline mentally.  Patient with no further syncopal episodes noted during this hospitalization.  The patient did complete a 7-day course of antibiotics during this hospitalization.     Day of Discharge     Vital Signs:     Physical Exam:               Constitutional: Awake and alert sitting up in bed, no acute distress             Respiratory: Clear to auscultation bilaterally, nonlabored respirations               Cardiovascular: Cardiac S1-S2, no murmurs, rubs, or gallops              Gastrointestinal: Obese abdomen, positive bowel sounds, soft, nontender, nondistended              Musculoskeletal: No bilateral ankle edema, no clubbing or cyanosis to extremities              Psychiatric: Mood/affect appropriate                          Neurologic: Oriented x3, moving all extremities , no focal weakness.             Discharge Details        Discharge Medications        Continue These Medications        Instructions Start Date   atorvastatin 10 MG tablet  Commonly known as: LIPITOR   10 mg, Oral, Daily      finasteride 5 MG tablet  Commonly known as: PROSCAR   5 mg, Oral, Daily      lisinopril 5 MG tablet  Commonly known  as: PRINIVIL,ZESTRIL   5 mg, Oral, Daily      melatonin 5 MG tablet tablet   5 mg, Oral, Nightly      meloxicam 7.5 MG tablet  Commonly known as: MOBIC   7.5 mg, Oral, Daily      metFORMIN 500 MG tablet  Commonly known as: GLUCOPHAGE   500 mg, Oral, 2 Times Daily With Meals      pioglitazone 30 MG tablet  Commonly known as: ACTOS   30 mg, Oral, Daily      SITagliptin 100 MG tablet  Commonly known as: JANUVIA   100 mg, Oral, Daily      TRESIBA FLEXTOUCH SC   12-15 Units, Subcutaneous, Daily      Vraylar 1.5 MG capsule capsule  Generic drug: Cariprazine HCl   1.5 mg, Oral, Every Evening               Allergies   Allergen Reactions    Morphine Itching and Confusion    Penicillins Other (See Comments)     unknown       Discharge Disposition:  Home or Self Care    Diet:  Hospital:  No active diet order      Discharge Activity:   Activity Instructions       Other Activity Instructions      Activity Instructions: as tolerated            CODE STATUS:  Code Status and Medical Interventions:   Ordered at: 09/28/21 3762     Code Status:    CPR     Medical Interventions (Level of Support Prior to Arrest):    Full         No future appointments.    Additional Instructions for the Follow-ups that You Need to Schedule       Ambulatory Referral to Physical Therapy Evaluate and treat; Strengthening   As directed      Specialty needed: Evaluate and treat    Exercises: Strengthening         Call MD With Problems / Concerns   As directed      Instructions: Fever not responsive to antipyretics, confusion, or any symptoms concerning to the patient    Order Comments: Instructions: Fever not responsive to antipyretics, confusion, or any symptoms concerning to the patient          Discharge Follow-up with PCP   As directed       Currently Documented PCP:    Cam Dickey MD    PCP Phone Number:    [phone]     Follow Up Details: hospital follow up 1 week                 Pertinent  and/or Most Recent Results       LAB RESULTS:       Brief Urine Lab Results  (Last result in the past 365 days)        Color   Clarity   Blood   Leuk Est   Nitrite   Protein   CREAT   Urine HCG        09/28/21 1501 Yellow   Cloudy   Trace   Moderate (2+)   Negative   30 mg/dL (1+)                 Microbiology Results (last 10 days)       ** No results found for the last 240 hours. **            Results for orders placed during the hospital encounter of 09/28/21    Adult Transthoracic Echo Complete W/ Cont if Necessary Per Protocol    Interpretation Summary  · Left ventricular ejection fraction appears to be 61 - 65%.  · Left ventricular diastolic function is consistent with (grade I) impaired relaxation.  · No evidence of significant valvular dysfunction      Time spent on Discharge including face to face service: 35 minutes    Electronically signed by Madeleine Saxena MD, 10/04/21, 12:00 PM EDT.

## 2021-10-04 NOTE — PLAN OF CARE
Goal Outcome Evaluation:  Plan of Care Reviewed With: (P) patient           Outcome Summary: (P) Pt demonstrates functional ROM, strength, bed mobility, transfers, and ambulation. Skilled PT services are not necessary at this time.

## 2021-10-05 ENCOUNTER — READMISSION MANAGEMENT (OUTPATIENT)
Dept: CALL CENTER | Facility: HOSPITAL | Age: 81
End: 2021-10-05

## 2021-10-05 NOTE — OUTREACH NOTE
Prep Survey      Responses   [facility] facility patient discharged from?  New   Is LACE score < 7 ?  Yes   Emergency Room discharge w/ pulse ox?  No   Eligibility  Readm Mgmt   Discharge diagnosis  Syncope and collapse   Does the patient have one of the following disease processes/diagnoses(primary or secondary)?  Other   Does the patient have Home health ordered?  No   Is there a DME ordered?  No   Prep survey completed?  Yes          Sima Arce RN

## 2021-12-23 ENCOUNTER — TRANSCRIBE ORDERS (OUTPATIENT)
Dept: ADMINISTRATIVE | Facility: HOSPITAL | Age: 81
End: 2021-12-23

## 2021-12-23 DIAGNOSIS — K74.60 HEPATIC CIRRHOSIS, UNSPECIFIED HEPATIC CIRRHOSIS TYPE, UNSPECIFIED WHETHER ASCITES PRESENT (HCC): Primary | ICD-10-CM

## 2022-06-24 ENCOUNTER — TRANSCRIBE ORDERS (OUTPATIENT)
Dept: ADMINISTRATIVE | Facility: HOSPITAL | Age: 82
End: 2022-06-24

## 2022-06-24 DIAGNOSIS — I71.21 ASCENDING AORTIC ANEURYSM: Primary | ICD-10-CM

## 2022-07-05 ENCOUNTER — HOSPITAL ENCOUNTER (OUTPATIENT)
Dept: CT IMAGING | Facility: HOSPITAL | Age: 82
Discharge: HOME | End: 2022-07-05
Admitting: FAMILY MEDICINE

## 2022-07-05 DIAGNOSIS — I71.21 ASCENDING AORTIC ANEURYSM: ICD-10-CM

## 2022-07-05 PROCEDURE — 71250 CT THORAX DX C-: CPT

## 2022-08-01 ENCOUNTER — APPOINTMENT (OUTPATIENT)
Dept: GENERAL RADIOLOGY | Facility: HOSPITAL | Age: 82
End: 2022-08-01

## 2022-08-01 ENCOUNTER — HOSPITAL ENCOUNTER (OUTPATIENT)
Facility: HOSPITAL | Age: 82
Setting detail: OBSERVATION
Discharge: HOME | End: 2022-08-04
Attending: EMERGENCY MEDICINE | Admitting: INTERNAL MEDICINE

## 2022-08-01 DIAGNOSIS — Z78.9 DECREASED ACTIVITIES OF DAILY LIVING (ADL): ICD-10-CM

## 2022-08-01 DIAGNOSIS — R41.82 ALTERED MENTAL STATUS, UNSPECIFIED ALTERED MENTAL STATUS TYPE: Primary | ICD-10-CM

## 2022-08-01 DIAGNOSIS — N39.0 ACUTE UTI: ICD-10-CM

## 2022-08-01 DIAGNOSIS — R26.2 DIFFICULTY WALKING: ICD-10-CM

## 2022-08-01 LAB
ALBUMIN SERPL-MCNC: 3.7 G/DL (ref 3.5–5.2)
ALBUMIN/GLOB SERPL: 1.1 G/DL
ALP SERPL-CCNC: 99 U/L (ref 39–117)
ALT SERPL W P-5'-P-CCNC: 30 U/L (ref 1–41)
ANION GAP SERPL CALCULATED.3IONS-SCNC: 12 MMOL/L (ref 5–15)
AST SERPL-CCNC: 62 U/L (ref 1–40)
BACTERIA UR QL AUTO: ABNORMAL /HPF
BASOPHILS # BLD AUTO: 0.04 10*3/MM3 (ref 0–0.2)
BASOPHILS NFR BLD AUTO: 0.5 % (ref 0–1.5)
BILIRUB SERPL-MCNC: 2.8 MG/DL (ref 0–1.2)
BILIRUB UR QL STRIP: NEGATIVE
BUN SERPL-MCNC: 19 MG/DL (ref 8–23)
BUN/CREAT SERPL: 16.5 (ref 7–25)
CALCIUM SPEC-SCNC: 9.8 MG/DL (ref 8.6–10.5)
CHLORIDE SERPL-SCNC: 99 MMOL/L (ref 98–107)
CLARITY UR: ABNORMAL
CO2 SERPL-SCNC: 20 MMOL/L (ref 22–29)
COLOR UR: YELLOW
CREAT SERPL-MCNC: 1.15 MG/DL (ref 0.76–1.27)
D-LACTATE SERPL-SCNC: 2.6 MMOL/L (ref 0.5–2)
D-LACTATE SERPL-SCNC: 3 MMOL/L (ref 0.5–2)
DEPRECATED RDW RBC AUTO: 58 FL (ref 37–54)
EGFRCR SERPLBLD CKD-EPI 2021: 64.3 ML/MIN/1.73
EOSINOPHIL # BLD AUTO: 0.09 10*3/MM3 (ref 0–0.4)
EOSINOPHIL NFR BLD AUTO: 1.1 % (ref 0.3–6.2)
ERYTHROCYTE [DISTWIDTH] IN BLOOD BY AUTOMATED COUNT: 16 % (ref 12.3–15.4)
GLOBULIN UR ELPH-MCNC: 3.3 GM/DL
GLUCOSE SERPL-MCNC: 135 MG/DL (ref 65–99)
GLUCOSE UR STRIP-MCNC: NEGATIVE MG/DL
HCT VFR BLD AUTO: 38.5 % (ref 37.5–51)
HGB BLD-MCNC: 14 G/DL (ref 13–17.7)
HGB UR QL STRIP.AUTO: ABNORMAL
HOLD SPECIMEN: NORMAL
HOLD SPECIMEN: NORMAL
HYALINE CASTS UR QL AUTO: ABNORMAL /LPF
IMM GRANULOCYTES # BLD AUTO: 0.07 10*3/MM3 (ref 0–0.05)
IMM GRANULOCYTES NFR BLD AUTO: 0.9 % (ref 0–0.5)
KETONES UR QL STRIP: NEGATIVE
LEUKOCYTE ESTERASE UR QL STRIP.AUTO: ABNORMAL
LYMPHOCYTES # BLD AUTO: 2.52 10*3/MM3 (ref 0.7–3.1)
LYMPHOCYTES NFR BLD AUTO: 32.1 % (ref 19.6–45.3)
MAGNESIUM SERPL-MCNC: 2.1 MG/DL (ref 1.6–2.4)
MCH RBC QN AUTO: 36.4 PG (ref 26.6–33)
MCHC RBC AUTO-ENTMCNC: 36.4 G/DL (ref 31.5–35.7)
MCV RBC AUTO: 100 FL (ref 79–97)
MONOCYTES # BLD AUTO: 0.66 10*3/MM3 (ref 0.1–0.9)
MONOCYTES NFR BLD AUTO: 8.4 % (ref 5–12)
NEUTROPHILS NFR BLD AUTO: 4.46 10*3/MM3 (ref 1.7–7)
NEUTROPHILS NFR BLD AUTO: 57 % (ref 42.7–76)
NITRITE UR QL STRIP: NEGATIVE
NRBC BLD AUTO-RTO: 0.3 /100 WBC (ref 0–0.2)
PH UR STRIP.AUTO: 6 [PH] (ref 5–8)
PLATELET # BLD AUTO: 177 10*3/MM3 (ref 140–450)
PMV BLD AUTO: 11.1 FL (ref 6–12)
POTASSIUM SERPL-SCNC: 4.3 MMOL/L (ref 3.5–5.2)
PROT SERPL-MCNC: 7 G/DL (ref 6–8.5)
PROT UR QL STRIP: NEGATIVE
RBC # BLD AUTO: 3.85 10*6/MM3 (ref 4.14–5.8)
RBC # UR STRIP: ABNORMAL /HPF
REF LAB TEST METHOD: ABNORMAL
SODIUM SERPL-SCNC: 131 MMOL/L (ref 136–145)
SP GR UR STRIP: 1.01 (ref 1–1.03)
SQUAMOUS #/AREA URNS HPF: ABNORMAL /HPF
TROPONIN T SERPL-MCNC: <0.01 NG/ML (ref 0–0.03)
UROBILINOGEN UR QL STRIP: ABNORMAL
WBC # UR STRIP: ABNORMAL /HPF
WBC NRBC COR # BLD: 7.84 10*3/MM3 (ref 3.4–10.8)
WHOLE BLOOD HOLD COAG: NORMAL
WHOLE BLOOD HOLD SPECIMEN: NORMAL

## 2022-08-01 PROCEDURE — 96365 THER/PROPH/DIAG IV INF INIT: CPT

## 2022-08-01 PROCEDURE — 93005 ELECTROCARDIOGRAM TRACING: CPT

## 2022-08-01 PROCEDURE — 87186 SC STD MICRODIL/AGAR DIL: CPT | Performed by: EMERGENCY MEDICINE

## 2022-08-01 PROCEDURE — 83605 ASSAY OF LACTIC ACID: CPT

## 2022-08-01 PROCEDURE — 99285 EMERGENCY DEPT VISIT HI MDM: CPT

## 2022-08-01 PROCEDURE — 80053 COMPREHEN METABOLIC PANEL: CPT | Performed by: EMERGENCY MEDICINE

## 2022-08-01 PROCEDURE — 71045 X-RAY EXAM CHEST 1 VIEW: CPT

## 2022-08-01 PROCEDURE — 25010000002 CEFTRIAXONE PER 250 MG

## 2022-08-01 PROCEDURE — 81001 URINALYSIS AUTO W/SCOPE: CPT | Performed by: EMERGENCY MEDICINE

## 2022-08-01 PROCEDURE — 87086 URINE CULTURE/COLONY COUNT: CPT | Performed by: EMERGENCY MEDICINE

## 2022-08-01 PROCEDURE — 99220 PR INITIAL OBSERVATION CARE/DAY 70 MINUTES: CPT | Performed by: HOSPITALIST

## 2022-08-01 PROCEDURE — 83735 ASSAY OF MAGNESIUM: CPT | Performed by: EMERGENCY MEDICINE

## 2022-08-01 PROCEDURE — 93005 ELECTROCARDIOGRAM TRACING: CPT | Performed by: EMERGENCY MEDICINE

## 2022-08-01 PROCEDURE — 84484 ASSAY OF TROPONIN QUANT: CPT | Performed by: EMERGENCY MEDICINE

## 2022-08-01 PROCEDURE — 87040 BLOOD CULTURE FOR BACTERIA: CPT

## 2022-08-01 PROCEDURE — 85025 COMPLETE CBC W/AUTO DIFF WBC: CPT | Performed by: EMERGENCY MEDICINE

## 2022-08-01 PROCEDURE — 87077 CULTURE AEROBIC IDENTIFY: CPT | Performed by: EMERGENCY MEDICINE

## 2022-08-01 RX ORDER — ENOXAPARIN SODIUM 100 MG/ML
40 INJECTION SUBCUTANEOUS EVERY 24 HOURS
Status: DISCONTINUED | OUTPATIENT
Start: 2022-08-02 | End: 2022-08-04 | Stop reason: HOSPADM

## 2022-08-01 RX ORDER — SODIUM CHLORIDE 0.9 % (FLUSH) 0.9 %
10 SYRINGE (ML) INJECTION AS NEEDED
Status: DISCONTINUED | OUTPATIENT
Start: 2022-08-01 | End: 2022-08-01

## 2022-08-01 RX ORDER — CHOLECALCIFEROL (VITAMIN D3) 125 MCG
5 CAPSULE ORAL NIGHTLY PRN
Status: DISCONTINUED | OUTPATIENT
Start: 2022-08-01 | End: 2022-08-04 | Stop reason: HOSPADM

## 2022-08-01 RX ORDER — CEFTRIAXONE SODIUM 1 G/50ML
1 INJECTION, SOLUTION INTRAVENOUS ONCE
Status: COMPLETED | OUTPATIENT
Start: 2022-08-01 | End: 2022-08-01

## 2022-08-01 RX ORDER — LISINOPRIL 5 MG/1
5 TABLET ORAL DAILY
Status: DISCONTINUED | OUTPATIENT
Start: 2022-08-02 | End: 2022-08-04 | Stop reason: HOSPADM

## 2022-08-01 RX ORDER — ALUMINA, MAGNESIA, AND SIMETHICONE 2400; 2400; 240 MG/30ML; MG/30ML; MG/30ML
15 SUSPENSION ORAL EVERY 6 HOURS PRN
Status: DISCONTINUED | OUTPATIENT
Start: 2022-08-01 | End: 2022-08-04 | Stop reason: HOSPADM

## 2022-08-01 RX ORDER — POLYETHYLENE GLYCOL 3350 17 G/17G
17 POWDER, FOR SOLUTION ORAL DAILY PRN
Status: DISCONTINUED | OUTPATIENT
Start: 2022-08-01 | End: 2022-08-04 | Stop reason: HOSPADM

## 2022-08-01 RX ORDER — DEXTROSE MONOHYDRATE 25 G/50ML
25 INJECTION, SOLUTION INTRAVENOUS
Status: DISCONTINUED | OUTPATIENT
Start: 2022-08-01 | End: 2022-08-04 | Stop reason: HOSPADM

## 2022-08-01 RX ORDER — ONDANSETRON 2 MG/ML
4 INJECTION INTRAMUSCULAR; INTRAVENOUS EVERY 6 HOURS PRN
Status: DISCONTINUED | OUTPATIENT
Start: 2022-08-01 | End: 2022-08-04 | Stop reason: HOSPADM

## 2022-08-01 RX ORDER — SODIUM CHLORIDE 9 MG/ML
40 INJECTION, SOLUTION INTRAVENOUS AS NEEDED
Status: DISCONTINUED | OUTPATIENT
Start: 2022-08-01 | End: 2022-08-04 | Stop reason: HOSPADM

## 2022-08-01 RX ORDER — MELOXICAM 7.5 MG/1
7.5 TABLET ORAL DAILY
Status: DISCONTINUED | OUTPATIENT
Start: 2022-08-02 | End: 2022-08-04 | Stop reason: HOSPADM

## 2022-08-01 RX ORDER — SODIUM CHLORIDE 0.9 % (FLUSH) 0.9 %
10 SYRINGE (ML) INJECTION AS NEEDED
Status: DISCONTINUED | OUTPATIENT
Start: 2022-08-01 | End: 2022-08-04 | Stop reason: HOSPADM

## 2022-08-01 RX ORDER — AMOXICILLIN 250 MG
2 CAPSULE ORAL 2 TIMES DAILY
Status: DISCONTINUED | OUTPATIENT
Start: 2022-08-02 | End: 2022-08-04 | Stop reason: HOSPADM

## 2022-08-01 RX ORDER — FINASTERIDE 5 MG/1
5 TABLET, FILM COATED ORAL DAILY
Status: DISCONTINUED | OUTPATIENT
Start: 2022-08-02 | End: 2022-08-04 | Stop reason: HOSPADM

## 2022-08-01 RX ORDER — INSULIN LISPRO 100 [IU]/ML
0-9 INJECTION, SOLUTION INTRAVENOUS; SUBCUTANEOUS
Status: DISCONTINUED | OUTPATIENT
Start: 2022-08-02 | End: 2022-08-04 | Stop reason: HOSPADM

## 2022-08-01 RX ORDER — ONDANSETRON 4 MG/1
4 TABLET, FILM COATED ORAL EVERY 6 HOURS PRN
Status: DISCONTINUED | OUTPATIENT
Start: 2022-08-01 | End: 2022-08-04 | Stop reason: HOSPADM

## 2022-08-01 RX ORDER — NICOTINE POLACRILEX 4 MG
24 LOZENGE BUCCAL
Status: DISCONTINUED | OUTPATIENT
Start: 2022-08-01 | End: 2022-08-04 | Stop reason: HOSPADM

## 2022-08-01 RX ORDER — ACETAMINOPHEN 160 MG/5ML
650 SOLUTION ORAL EVERY 4 HOURS PRN
Status: DISCONTINUED | OUTPATIENT
Start: 2022-08-01 | End: 2022-08-02

## 2022-08-01 RX ORDER — BISACODYL 5 MG/1
5 TABLET, DELAYED RELEASE ORAL DAILY PRN
Status: DISCONTINUED | OUTPATIENT
Start: 2022-08-01 | End: 2022-08-04 | Stop reason: HOSPADM

## 2022-08-01 RX ORDER — CEFTRIAXONE SODIUM 1 G/50ML
1 INJECTION, SOLUTION INTRAVENOUS EVERY 24 HOURS
Status: DISCONTINUED | OUTPATIENT
Start: 2022-08-02 | End: 2022-08-03

## 2022-08-01 RX ORDER — BISACODYL 10 MG
10 SUPPOSITORY, RECTAL RECTAL DAILY PRN
Status: DISCONTINUED | OUTPATIENT
Start: 2022-08-01 | End: 2022-08-04 | Stop reason: HOSPADM

## 2022-08-01 RX ORDER — NITROGLYCERIN 0.4 MG/1
0.4 TABLET SUBLINGUAL
Status: DISCONTINUED | OUTPATIENT
Start: 2022-08-01 | End: 2022-08-02

## 2022-08-01 RX ORDER — SODIUM CHLORIDE 0.9 % (FLUSH) 0.9 %
10 SYRINGE (ML) INJECTION EVERY 12 HOURS SCHEDULED
Status: DISCONTINUED | OUTPATIENT
Start: 2022-08-02 | End: 2022-08-04 | Stop reason: HOSPADM

## 2022-08-01 RX ORDER — ACETAMINOPHEN 325 MG/1
650 TABLET ORAL EVERY 4 HOURS PRN
Status: DISCONTINUED | OUTPATIENT
Start: 2022-08-01 | End: 2022-08-04 | Stop reason: HOSPADM

## 2022-08-01 RX ORDER — ACETAMINOPHEN 650 MG/1
650 SUPPOSITORY RECTAL EVERY 4 HOURS PRN
Status: DISCONTINUED | OUTPATIENT
Start: 2022-08-01 | End: 2022-08-02

## 2022-08-01 RX ORDER — SODIUM CHLORIDE 9 MG/ML
100 INJECTION, SOLUTION INTRAVENOUS CONTINUOUS
Status: DISCONTINUED | OUTPATIENT
Start: 2022-08-02 | End: 2022-08-02

## 2022-08-01 RX ORDER — ATORVASTATIN CALCIUM 10 MG/1
10 TABLET, FILM COATED ORAL DAILY
Status: DISCONTINUED | OUTPATIENT
Start: 2022-08-02 | End: 2022-08-04 | Stop reason: HOSPADM

## 2022-08-01 RX ADMIN — SODIUM CHLORIDE 1000 ML: 9 INJECTION, SOLUTION INTRAVENOUS at 19:59

## 2022-08-01 RX ADMIN — CEFTRIAXONE SODIUM 1 G: 1 INJECTION, SOLUTION INTRAVENOUS at 20:01

## 2022-08-01 NOTE — ED PROVIDER NOTES
"Time: 6:36 PM EDT  Arrived by: Ambulance  Chief Complaint: Altered mental status.  History provided by: Daughter and patient  History is limited by: N/A     History of Present Illness:  Patient is a 81 y.o. year old male who presents to the emergency department with altered mental status.    Patient was brought to the emergency department via EMS and is escorted by his daughter for altered mental status.  The patient's daughter states the patient lives at home with his wife and she was concerned because last night he was \"speaking out of his head\".  The daughter states the patient has been normal since she has been with him today.  Daughter states the patient has a history of frequent UTIs and he usually has the symptoms when he develops a UTI.  Daughter states they wanted him evaluated because he became septic with his last UTI and they wanted to get to the infection related this is the cause of his altered mental status.  The patient is not complaining of chest pain, shortness of breath, abdominal pain, nausea, vomiting, or dysuria.  No other complaints.      History provided by:  Patient   used: No    Altered Mental Status  Presenting symptoms: confusion    Most recent episode:  Yesterday  Progression:  Resolved  Chronicity:  New  Associated symptoms: no abdominal pain, normal movement, no agitation, no bladder incontinence, no decreased appetite, no depression, no difficulty breathing, no eye deviation, no fever, no hallucinations, no headaches, no light-headedness, no nausea, no palpitations, no rash, no seizures, no slurred speech, no visual change, no vomiting and no weakness        Similar Symptoms Previously: Yes  Recently seen: No      Patient Care Team  Primary Care Provider: Cam Dickey MD    Past Medical History:     Allergies   Allergen Reactions   • Morphine Itching and Confusion   • Penicillins Other (See Comments)     unknown     Past Medical History:   Diagnosis Date   • " Aneurysm of aorta (HCC)    • Anxiety and depression    • Arthritis    • BPH (benign prostatic hyperplasia)    • Diabetes mellitus (HCC)    • Hard of hearing    • History of frequent urinary tract infections    • History of MI (myocardial infarction)     STATES WAS TOLD HE HAD IN PAST, NEVER SAW CARDIOLOGY   • Hypertension    • Pulmonary nodules      Past Surgical History:   Procedure Laterality Date   • CATARACT EXTRACTION, BILATERAL     • COLONOSCOPY  approx 2017    normal per patient   • CYSTOSCOPY TRANSURETHRAL RESECTION OF PROSTATE N/A 9/10/2018    Procedure: TRANSURETHRAL RESECTION OF PROSTATE;  Surgeon: Art Dickerson MD;  Location: [facility] OR;  Service: Urology   • ENDOSCOPIC FUNCTIONAL SINUS SURGERY (FESS) N/A 5/15/2019    Procedure: ETHMOIDECTOMY,LEFT INTERNASAL  ANTROSTOMY;  Surgeon: Mark Linda MD;  [place] [facility] [place] [facility] [place]  Service: ENT   • FRACTURE SURGERY      LT ARM   • HEMORRHOIDECTOMY     • SEPTOPLASTY N/A 5/15/2019    Procedure: NASAL SEPTOPLASTY,;  Surgeon: Mark Linda MD;  [place] [facility] [place] [facility] [place]  Service: ENT     Family History   Problem Relation Age of Onset   • Malig Hyperthermia Neg Hx        Home Medications:  Prior to Admission medications    Medication Sig Start Date End Date Taking? Authorizing Provider   atorvastatin (LIPITOR) 10 MG tablet Take 10 mg by mouth Daily. 5/21/21   Aracelis Chapman MD   Cariprazine HCl (VRAYLAR) 1.5 MG capsule capsule Take 1.5 mg by mouth Every Evening.    Aracelis Chapman MD   finasteride (PROSCAR) 5 MG tablet Take 5 mg by mouth Daily. 5/3/21   Aracelis Chapman MD   Insulin Degludec (TRESIBA FLEXTOUCH SC) Inject 12-15 Units under the skin into the appropriate area as directed Daily.    Aracelis Chapman MD   lisinopril (PRINIVIL,ZESTRIL) 5 MG tablet Take 5 mg by mouth Daily.    Aracelis Chapman MD   melatonin 5 MG tablet tablet Take 5 mg by mouth Every Night.    Aracelis Chapman MD   meloxicam (MOBIC)  "7.5 MG tablet Take 7.5 mg by mouth Daily. 5/13/21   Aracelis Chapman MD   metFORMIN (GLUCOPHAGE) 500 MG tablet Take 500 mg by mouth 2 (Two) Times a Day With Meals.    Aracelis Chapman MD   pioglitazone (ACTOS) 30 MG tablet Take 30 mg by mouth Daily. 3/22/21   Aracelis Chapman MD   SITagliptin (JANUVIA) 100 MG tablet Take 100 mg by mouth Daily.    Aracelis Chapman MD        Social History:   Social History     Tobacco Use   • Smoking status: Former Smoker     Types: Cigars, Cigarettes   • Smokeless tobacco: Current User     Types: Chew   • Tobacco comment: 25 YEARS AGO   Substance Use Topics   • Alcohol use: No   • Drug use: No     Recent travel: no     Review of Systems:  Review of Systems   Constitutional: Negative for chills, decreased appetite and fever.   HENT: Negative for congestion and ear pain.    Eyes: Negative for pain.   Respiratory: Negative for cough and shortness of breath.    Cardiovascular: Negative for chest pain and palpitations.   Gastrointestinal: Negative for abdominal pain, diarrhea, nausea and vomiting.   Genitourinary: Negative for bladder incontinence, dysuria and hematuria.   Musculoskeletal: Negative for myalgias.   Skin: Negative for rash.   Neurological: Negative for dizziness, seizures, weakness, light-headedness and headaches.   Psychiatric/Behavioral: Positive for confusion. Negative for agitation and hallucinations.        Physical Exam:  /79   Pulse 82   Temp 98.7 °F (37.1 °C)   Resp 19   Ht 172.7 cm (68\")   Wt 97.1 kg (214 lb 1.1 oz)   SpO2 98%   BMI 32.55 kg/m²     Physical Exam  Vitals and nursing note reviewed.   Constitutional:       General: He is not in acute distress.     Appearance: Normal appearance. He is well-developed and normal weight. He is not ill-appearing or diaphoretic.   HENT:      Head: Normocephalic and atraumatic.      Nose: Nose normal.   Eyes:      Extraocular Movements: Extraocular movements intact.      Conjunctiva/sclera: " Conjunctivae normal.      Pupils: Pupils are equal, round, and reactive to light.   Cardiovascular:      Rate and Rhythm: Normal rate and regular rhythm.      Heart sounds: Normal heart sounds.   Pulmonary:      Effort: Pulmonary effort is normal.      Breath sounds: Normal breath sounds.   Abdominal:      General: Abdomen is flat. Bowel sounds are normal.      Palpations: Abdomen is soft.   Musculoskeletal:         General: Normal range of motion.      Cervical back: Normal range of motion and neck supple.   Skin:     General: Skin is warm and dry.   Neurological:      General: No focal deficit present.      Mental Status: He is alert and oriented to person, place, and time.   Psychiatric:         Mood and Affect: Mood normal.         Speech: Speech normal.         Behavior: Behavior normal.         Thought Content: Thought content normal.         Judgment: Judgment normal.                Medications in the Emergency Department:  Medications   nitroglycerin (NITROSTAT) SL tablet 0.4 mg (has no administration in time range)   sodium chloride 0.9 % flush 10 mL (has no administration in time range)   sodium chloride 0.9 % flush 10 mL (has no administration in time range)   sodium chloride 0.9 % infusion 40 mL (has no administration in time range)   Enoxaparin Sodium (LOVENOX) syringe 40 mg (has no administration in time range)   sodium chloride 0.9 % infusion (has no administration in time range)   ondansetron (ZOFRAN) tablet 4 mg (has no administration in time range)     Or   ondansetron (ZOFRAN) injection 4 mg (has no administration in time range)   sennosides-docusate (PERICOLACE) 8.6-50 MG per tablet 2 tablet (has no administration in time range)     And   polyethylene glycol (MIRALAX) packet 17 g (has no administration in time range)     And   bisacodyl (DULCOLAX) EC tablet 5 mg (has no administration in time range)     And   bisacodyl (DULCOLAX) suppository 10 mg (has no administration in time range)   melatonin  tablet 5 mg (has no administration in time range)   acetaminophen (TYLENOL) tablet 650 mg (has no administration in time range)     Or   acetaminophen (TYLENOL) 160 MG/5ML solution 650 mg (has no administration in time range)     Or   acetaminophen (TYLENOL) suppository 650 mg (has no administration in time range)   aluminum-magnesium hydroxide-simethicone (MAALOX MAX) 400-400-40 MG/5ML suspension 15 mL (has no administration in time range)   dextrose (GLUTOSE) oral gel 24 g (has no administration in time range)   dextrose (D50W) (25 g/50 mL) IV injection 25 g (has no administration in time range)   glucagon (human recombinant) (GLUCAGEN DIAGNOSTIC) injection 1 mg (has no administration in time range)   Insulin Lispro (humaLOG) injection 0-9 Units (has no administration in time range)   atorvastatin (LIPITOR) tablet 10 mg (has no administration in time range)   finasteride (PROSCAR) tablet 5 mg (has no administration in time range)   lisinopril (PRINIVIL,ZESTRIL) tablet 5 mg (has no administration in time range)   meloxicam (MOBIC) tablet 7.5 mg (has no administration in time range)   cefTRIAXone (ROCEPHIN) IVPB 1 g (has no administration in time range)   sodium chloride 0.9 % bolus 1,000 mL (0 mL Intravenous Stopped 8/1/22 2258)   cefTRIAXone (ROCEPHIN) IVPB 1 g (0 g Intravenous Stopped 8/1/22 2245)        Labs  Lab Results (last 24 hours)     Procedure Component Value Units Date/Time    CBC & Differential [[id number]]  (Abnormal) Collected: 08/01/22 1607    Specimen: Blood Updated: 08/01/22 1621    Narrative:      The following orders were created for panel order CBC & Differential.  Procedure                               Abnormality         Status                     ---------                               -----------         ------                     CBC Auto Differential[[id number]]        Abnormal            Final result                 Please view results for these tests on the individual orders.     Comprehensive Metabolic Panel [[id number]]  (Abnormal) Collected: 08/01/22 1607    Specimen: Blood Updated: 08/01/22 1647     Glucose 135 mg/dL      BUN 19 mg/dL      Creatinine 1.15 mg/dL      Sodium 131 mmol/L      Potassium 4.3 mmol/L      Comment: Slight hemolysis detected by analyzer. Results may be affected.        Chloride 99 mmol/L      CO2 20.0 mmol/L      Calcium 9.8 mg/dL      Total Protein 7.0 g/dL      Albumin 3.70 g/dL      ALT (SGPT) 30 U/L      AST (SGOT) 62 U/L      Comment: Slight hemolysis detected by analyzer. Results may be affected.        Alkaline Phosphatase 99 U/L      Total Bilirubin 2.8 mg/dL      Globulin 3.3 gm/dL      A/G Ratio 1.1 g/dL      BUN/Creatinine Ratio 16.5     Anion Gap 12.0 mmol/L      eGFR 64.3 mL/min/1.73      Comment: National Kidney Foundation and American Society of Nephrology (ASN) Task Force recommended calculation based on the Chronic Kidney Disease Epidemiology Collaboration (CKD-EPI) equation refit without adjustment for race.       Narrative:      GFR Normal >60  Chronic Kidney Disease <60  Kidney Failure <15      Troponin [[id number]]  (Normal) Collected: 08/01/22 1607    Specimen: Blood Updated: 08/01/22 1639     Troponin T <0.010 ng/mL     Narrative:      Troponin T Reference Range:  <= 0.03 ng/mL-   Negative for AMI  >0.03 ng/mL-     Abnormal for myocardial necrosis.  Clinicians would have to utilize clinical acumen, EKG, Troponin and serial changes to determine if it is an Acute Myocardial Infarction or myocardial injury due to an underlying chronic condition.       Results may be falsely decreased if patient taking Biotin.      Magnesium [[id number]]  (Normal) Collected: 08/01/22 1607    Specimen: Blood Updated: 08/01/22 1645     Magnesium 2.1 mg/dL     CBC Auto Differential [[id number]]  (Abnormal) Collected: 08/01/22 1607    Specimen: Blood Updated: 08/01/22 1621     WBC 7.84 10*3/mm3      RBC 3.85 10*6/mm3      Hemoglobin 14.0 g/dL      Hematocrit 38.5 %       .0 fL      MCH 36.4 pg      MCHC 36.4 g/dL      RDW 16.0 %      RDW-SD 58.0 fl      MPV 11.1 fL      Platelets 177 10*3/mm3      Neutrophil % 57.0 %      Lymphocyte % 32.1 %      Monocyte % 8.4 %      Eosinophil % 1.1 %      Basophil % 0.5 %      Immature Grans % 0.9 %      Neutrophils, Absolute 4.46 10*3/mm3      Lymphocytes, Absolute 2.52 10*3/mm3      Monocytes, Absolute 0.66 10*3/mm3      Eosinophils, Absolute 0.09 10*3/mm3      Basophils, Absolute 0.04 10*3/mm3      Immature Grans, Absolute 0.07 10*3/mm3      nRBC 0.3 /100 WBC     Urinalysis With Culture If Indicated - Urine, Random Void [[id number]]  (Abnormal) Collected: 08/01/22 1916    Specimen: Urine, Random Void Updated: 08/01/22 1933     Color, UA Yellow     Appearance, UA Cloudy     pH, UA 6.0     Specific Gravity, UA 1.015     Glucose, UA Negative     Ketones, UA Negative     Bilirubin, UA Negative     Blood, UA Small (1+)     Protein, UA Negative     Leuk Esterase, UA Moderate (2+)     Nitrite, UA Negative     Urobilinogen, UA 2.0 E.U./dL    Narrative:      In absence of clinical symptoms, the presence of pyuria, bacteria, and/or nitrites on the urinalysis result does not correlate with infection.    Urinalysis, Microscopic Only - Urine, Random Void [[id number]]  (Abnormal) Collected: 08/01/22 1916    Specimen: Urine, Random Void Updated: 08/01/22 1933     RBC, UA 3-5 /HPF      WBC, UA Too Numerous to Count /HPF      Bacteria, UA 4+ /HPF      Squamous Epithelial Cells, UA 0-2 /HPF      Hyaline Casts, UA 0-2 /LPF      Methodology Automated Microscopy    Urine Culture - Urine, Urine, Random Void [[id number]] Collected: 08/01/22 1916    Specimen: Urine, Random Void Updated: 08/01/22 1933    Lactic Acid, Plasma [[id number]]  (Abnormal) Collected: 08/01/22 1959    Specimen: Blood from Arm, Right Updated: 08/01/22 2115     Lactate 2.6 mmol/L     Blood Culture - Blood, Arm, Right [[id number]] Collected: 08/01/22 1959    Specimen: Blood from Arm, Right  Updated: 08/01/22 2007    Blood Culture - Blood, Arm, Right [[id number]] Collected: 08/01/22 1959    Specimen: Blood from Arm, Right Updated: 08/01/22 2007    STAT Lactic Acid, Reflex [[id number]]  (Abnormal) Collected: 08/01/22 2245    Specimen: Blood from Arm, Right Updated: 08/01/22 2331     Lactate 3.0 mmol/L            Imaging:  XR Chest 1 View    Result Date: 8/1/2022  PROCEDURE: XR CHEST 1 VW  COMPARISON: [facility], CR, CHEST AP/PA 1 VIEW, 1/19/2021, 13:20.  INDICATIONS: Weak/Dizzy/AMS triage protocol  FINDINGS:  CT stable heart and mediastinal contour.  Granulomatous disease.  No significant pleural effusion is seen.  No dense consolidation.  No other significant changes.         1. No acute disease or change.       JOSE DANIEL MANDUJANO MD       Electronically Signed and Approved By: JOSE DANIEL MANDUJANO MD on 8/01/2022 at 17:08               Procedures:  Procedures    Progress  ED Course as of 08/02/22 0025   Mon Aug 01, 2022   1833 Repeat /74 [MD]   1833 Patient has history of cirrhosis with elevated bilirubin and liver enzymes chronic [MD]   1834 I spoke with patients other daughter who is a POA over the phone while in the patients room for initial evaluation [MD]   1659 Spoke with patient and daughter present in the room.  Patient and daughter would like for the patient to go home.  I informed them that I would like for him to finish his fluids now and recheck a lactic acid to ensure that it is improving before sending him home.  Patient's vitals and mental status have been stable while in emergency department. [MD]   8492 Spoke with Dr. Blancas, she will admit [MD]      ED Course User Index  [MD] Mark Vitale, PANemesioC                            Medical Decision Making:  MDM  Number of Diagnoses or Management Options  Acute UTI  Altered mental status, unspecified altered mental status type  Diagnosis management comments: I spoken with the patient and daughter.  They are agreeable for  admission.       Amount and/or Complexity of Data Reviewed  Clinical lab tests: reviewed and ordered  Tests in the radiology section of CPT®: reviewed and ordered  Decide to obtain previous medical records or to obtain history from someone other than the patient: yes    Risk of Complications, Morbidity, and/or Mortality  Presenting problems: moderate  Diagnostic procedures: low  Management options: low    Patient Progress  Patient progress: stable       Final diagnoses:   Altered mental status, unspecified altered mental status type   Acute UTI        Disposition:  ED Disposition     ED Disposition   Decision to Admit    Condition   --    Comment   Level of Care: Remote Telemetry [26]   Diagnosis: Altered mental status, unspecified altered mental status type [[id number]]   Admitting Physician: ALEX COFFEY [[id number]]   Attending Physician: ALEX COFFEY [[id number]]               This medical record created using voice recognition software.           Mark Vitale PA-C  08/02/22 0025

## 2022-08-02 LAB
ANION GAP SERPL CALCULATED.3IONS-SCNC: 9.9 MMOL/L (ref 5–15)
BASOPHILS # BLD AUTO: 0.03 10*3/MM3 (ref 0–0.2)
BASOPHILS NFR BLD AUTO: 0.4 % (ref 0–1.5)
BUN SERPL-MCNC: 16 MG/DL (ref 8–23)
BUN/CREAT SERPL: 15.2 (ref 7–25)
CALCIUM SPEC-SCNC: 9.6 MG/DL (ref 8.6–10.5)
CHLORIDE SERPL-SCNC: 102 MMOL/L (ref 98–107)
CO2 SERPL-SCNC: 20.1 MMOL/L (ref 22–29)
CREAT SERPL-MCNC: 1.05 MG/DL (ref 0.76–1.27)
D-LACTATE SERPL-SCNC: 2.7 MMOL/L (ref 0.5–2)
D-LACTATE SERPL-SCNC: 3.1 MMOL/L (ref 0.5–2)
D-LACTATE SERPL-SCNC: 3.1 MMOL/L (ref 0.5–2)
DEPRECATED RDW RBC AUTO: 59 FL (ref 37–54)
EGFRCR SERPLBLD CKD-EPI 2021: 71.8 ML/MIN/1.73
EOSINOPHIL # BLD AUTO: 0.06 10*3/MM3 (ref 0–0.4)
EOSINOPHIL NFR BLD AUTO: 0.7 % (ref 0.3–6.2)
ERYTHROCYTE [DISTWIDTH] IN BLOOD BY AUTOMATED COUNT: 16.1 % (ref 12.3–15.4)
GLUCOSE BLDC GLUCOMTR-MCNC: 132 MG/DL (ref 70–99)
GLUCOSE BLDC GLUCOMTR-MCNC: 147 MG/DL (ref 70–99)
GLUCOSE BLDC GLUCOMTR-MCNC: 164 MG/DL (ref 70–99)
GLUCOSE SERPL-MCNC: 140 MG/DL (ref 65–99)
HCT VFR BLD AUTO: 38 % (ref 37.5–51)
HGB BLD-MCNC: 13.4 G/DL (ref 13–17.7)
IMM GRANULOCYTES # BLD AUTO: 0.07 10*3/MM3 (ref 0–0.05)
IMM GRANULOCYTES NFR BLD AUTO: 0.9 % (ref 0–0.5)
LYMPHOCYTES # BLD AUTO: 2.35 10*3/MM3 (ref 0.7–3.1)
LYMPHOCYTES NFR BLD AUTO: 29.3 % (ref 19.6–45.3)
MAGNESIUM SERPL-MCNC: 2 MG/DL (ref 1.6–2.4)
MCH RBC QN AUTO: 35.5 PG (ref 26.6–33)
MCHC RBC AUTO-ENTMCNC: 35.3 G/DL (ref 31.5–35.7)
MCV RBC AUTO: 100.8 FL (ref 79–97)
MONOCYTES # BLD AUTO: 0.65 10*3/MM3 (ref 0.1–0.9)
MONOCYTES NFR BLD AUTO: 8.1 % (ref 5–12)
NEUTROPHILS NFR BLD AUTO: 4.85 10*3/MM3 (ref 1.7–7)
NEUTROPHILS NFR BLD AUTO: 60.6 % (ref 42.7–76)
NRBC BLD AUTO-RTO: 0 /100 WBC (ref 0–0.2)
PHOSPHATE SERPL-MCNC: 2.7 MG/DL (ref 2.5–4.5)
PLATELET # BLD AUTO: 127 10*3/MM3 (ref 140–450)
PMV BLD AUTO: 9.5 FL (ref 6–12)
POTASSIUM SERPL-SCNC: 4.3 MMOL/L (ref 3.5–5.2)
RBC # BLD AUTO: 3.77 10*6/MM3 (ref 4.14–5.8)
SARS-COV-2 RNA PNL SPEC NAA+PROBE: DETECTED
SODIUM SERPL-SCNC: 132 MMOL/L (ref 136–145)
WBC NRBC COR # BLD: 8.01 10*3/MM3 (ref 3.4–10.8)

## 2022-08-02 PROCEDURE — G0378 HOSPITAL OBSERVATION PER HR: HCPCS

## 2022-08-02 PROCEDURE — 63710000001 INSULIN LISPRO (HUMAN) PER 5 UNITS: Performed by: HOSPITALIST

## 2022-08-02 PROCEDURE — 25010000002 ENOXAPARIN PER 10 MG: Performed by: HOSPITALIST

## 2022-08-02 PROCEDURE — 25010000002 CEFTRIAXONE PER 250 MG: Performed by: HOSPITALIST

## 2022-08-02 PROCEDURE — 96361 HYDRATE IV INFUSION ADD-ON: CPT

## 2022-08-02 PROCEDURE — 85025 COMPLETE CBC W/AUTO DIFF WBC: CPT | Performed by: HOSPITALIST

## 2022-08-02 PROCEDURE — 83735 ASSAY OF MAGNESIUM: CPT | Performed by: HOSPITALIST

## 2022-08-02 PROCEDURE — 83605 ASSAY OF LACTIC ACID: CPT

## 2022-08-02 PROCEDURE — 97165 OT EVAL LOW COMPLEX 30 MIN: CPT

## 2022-08-02 PROCEDURE — 80048 BASIC METABOLIC PNL TOTAL CA: CPT | Performed by: HOSPITALIST

## 2022-08-02 PROCEDURE — 97161 PT EVAL LOW COMPLEX 20 MIN: CPT

## 2022-08-02 PROCEDURE — 96372 THER/PROPH/DIAG INJ SC/IM: CPT

## 2022-08-02 PROCEDURE — 82962 GLUCOSE BLOOD TEST: CPT

## 2022-08-02 PROCEDURE — 36415 COLL VENOUS BLD VENIPUNCTURE: CPT

## 2022-08-02 PROCEDURE — 99226 PR SBSQ OBSERVATION CARE/DAY 35 MINUTES: CPT | Performed by: INTERNAL MEDICINE

## 2022-08-02 PROCEDURE — U0004 COV-19 TEST NON-CDC HGH THRU: HCPCS | Performed by: HOSPITALIST

## 2022-08-02 PROCEDURE — 96366 THER/PROPH/DIAG IV INF ADDON: CPT

## 2022-08-02 PROCEDURE — 84100 ASSAY OF PHOSPHORUS: CPT | Performed by: HOSPITALIST

## 2022-08-02 RX ORDER — CEPHALEXIN 250 MG/1
250 CAPSULE ORAL DAILY
COMMUNITY
Start: 2022-06-29 | End: 2022-08-04 | Stop reason: HOSPADM

## 2022-08-02 RX ORDER — BENZONATATE 100 MG/1
100 CAPSULE ORAL 3 TIMES DAILY PRN
COMMUNITY
End: 2022-09-21

## 2022-08-02 RX ORDER — TRAZODONE HYDROCHLORIDE 50 MG/1
50 TABLET ORAL NIGHTLY
Status: DISCONTINUED | OUTPATIENT
Start: 2022-08-02 | End: 2022-08-04 | Stop reason: HOSPADM

## 2022-08-02 RX ORDER — CHOLECALCIFEROL (VITAMIN D3) 125 MCG
1000 CAPSULE ORAL DAILY
COMMUNITY

## 2022-08-02 RX ORDER — SULFAMETHOXAZOLE AND TRIMETHOPRIM 800; 160 MG/1; MG/1
1 TABLET ORAL 2 TIMES DAILY
COMMUNITY
Start: 2022-07-25 | End: 2022-08-04 | Stop reason: HOSPADM

## 2022-08-02 RX ADMIN — INSULIN LISPRO 2 UNITS: 100 INJECTION, SOLUTION INTRAVENOUS; SUBCUTANEOUS at 17:23

## 2022-08-02 RX ADMIN — ATORVASTATIN CALCIUM 10 MG: 10 TABLET, FILM COATED ORAL at 08:22

## 2022-08-02 RX ADMIN — CEFTRIAXONE SODIUM 1 G: 1 INJECTION, SOLUTION INTRAVENOUS at 23:21

## 2022-08-02 RX ADMIN — SENNOSIDES AND DOCUSATE SODIUM 2 TABLET: 50; 8.6 TABLET ORAL at 21:14

## 2022-08-02 RX ADMIN — LISINOPRIL 5 MG: 5 TABLET ORAL at 08:22

## 2022-08-02 RX ADMIN — TRAZODONE HYDROCHLORIDE 50 MG: 50 TABLET ORAL at 21:14

## 2022-08-02 RX ADMIN — ENOXAPARIN SODIUM 40 MG: 100 INJECTION SUBCUTANEOUS at 23:24

## 2022-08-02 RX ADMIN — SODIUM CHLORIDE 100 ML/HR: 9 INJECTION, SOLUTION INTRAVENOUS at 12:08

## 2022-08-02 RX ADMIN — FINASTERIDE 5 MG: 5 TABLET, FILM COATED ORAL at 08:22

## 2022-08-02 RX ADMIN — MELOXICAM 7.5 MG: 7.5 TABLET ORAL at 08:22

## 2022-08-02 RX ADMIN — Medication 10 ML: at 21:14

## 2022-08-02 RX ADMIN — SODIUM CHLORIDE 100 ML/HR: 9 INJECTION, SOLUTION INTRAVENOUS at 02:53

## 2022-08-02 NOTE — THERAPY EVALUATION
Acute Care - Physical Therapy Initial Evaluation   [facility]     Patient Name: Teto Castle  : 1940  MRN: [id number]  Today's Date: 2022      Visit Dx:     ICD-10-CM ICD-9-CM   1. Altered mental status, unspecified altered mental status type  R41.82 780.97   2. Acute UTI  N39.0 599.0   3. Decreased activities of daily living (ADL)  Z78.9 V49.89   4. Difficulty walking  R26.2 719.7     Patient Active Problem List   Diagnosis   • BPH with obstruction/lower urinary tract symptoms   • Essential hypertension   • Type 2 diabetes mellitus without complication (HCC)   • Thrombocytopenia (HCC)   • B12 deficiency   • Syncope and collapse   • Altered mental status, unspecified altered mental status type     Past Medical History:   Diagnosis Date   • Aneurysm of aorta (HCC)    • Anxiety and depression    • Arthritis    • BPH (benign prostatic hyperplasia)    • Diabetes mellitus (HCC)    • Hard of hearing    • History of frequent urinary tract infections    • History of MI (myocardial infarction)     STATES WAS TOLD HE HAD IN PAST, NEVER SAW CARDIOLOGY   • Hypertension    • Pulmonary nodules      Past Surgical History:   Procedure Laterality Date   • CATARACT EXTRACTION, BILATERAL     • COLONOSCOPY  approx     normal per patient   • CYSTOSCOPY TRANSURETHRAL RESECTION OF PROSTATE N/A 9/10/2018    Procedure: TRANSURETHRAL RESECTION OF PROSTATE;  Surgeon: Art Dickerson MD;  Location: [facility];  Service: Urology   • ENDOSCOPIC FUNCTIONAL SINUS SURGERY (FESS) N/A 5/15/2019    Procedure: ETHMOIDECTOMY,LEFT INTERNASAL  ANTROSTOMY;  Surgeon: Mark Linda MD;  Location: [facility] [place] [facility] [place];  Service: ENT   • FRACTURE SURGERY      LT ARM   • HEMORRHOIDECTOMY     • SEPTOPLASTY N/A 5/15/2019    Procedure: NASAL SEPTOPLASTY,;  Surgeon: Mark Linda MD;  Location: [facility] OR [facility] [place];  Service: ENT     PT Assessment (last 12 hours)     PT Evaluation and Treatment     Row Name 22 1500          Physical  Therapy Time and Intention    Subjective Information no complaints  -CS     Document Type evaluation  -CS     Mode of Treatment individual therapy;physical therapy  -CS     Patient Effort good  -CS     Symptoms Noted During/After Treatment shortness of breath  -CS     Row Name 08/02/22 1500          General Information    Patient Profile Reviewed yes  -CS     Patient Observations alert;cooperative;agree to therapy  -CS     Prior Level of Function independent:;all household mobility;gait;transfer;bed mobility;ADL's;community mobility;driving  -CS     Equipment Currently Used at Home none  -CS     Existing Precautions/Restrictions fall  -CS     Barriers to Rehab none identified  -CS     Row Name 08/02/22 1500          Living Environment    Current Living Arrangements apartment  -CS     Home Accessibility stairs to enter home;stairs within home  -CS     People in Home spouse  -CS     Primary Care Provided by self  -CS     Row Name 08/02/22 1500          Home Main Entrance    Number of Stairs, Main Entrance none  -CS     Row Name 08/02/22 1500          Stairs Within Home, Primary    Number of Stairs, Within Home, Primary none  -CS     Row Name 08/02/22 1500          Cognition    Orientation Status (Cognition) oriented x 4  -CS     Row Name 08/02/22 1500          Range of Motion Comprehensive    General Range of Motion bilateral lower extremity ROM WFL  -CS     Row Name 08/02/22 1500          Strength Comprehensive (MMT)    General Manual Muscle Testing (MMT) Assessment no strength deficits identified  -CS     Row Name 08/02/22 1500          Bed Mobility    Bed Mobility bed mobility (all) activities  -CS     All Activities, [place] (Bed Mobility) supervision  -CS     Bed Mobility, Safety Issues decreased use of arms for pushing/pulling;decreased use of legs for bridging/pushing  -CS     Assistive Device (Bed Mobility) bed rails  -CS     Row Name 08/02/22 1500          Transfers    Transfers sit-stand transfer  -CS      Sit-Stand [place] (Transfers) standby assist;contact guard;verbal cues  -CS     Row Name 08/02/22 1500          Gait/Stairs (Locomotion)    Gait/Stairs Locomotion gait/ambulation assistive device  -CS     [place] Level (Gait) contact guard  -CS     Assistive Device (Gait) other (see comments)  none  -CS     Distance in Feet (Gait) 20  -CS     Row Name 08/02/22 1500          Safety Issues, Functional Mobility    Impairments Affecting Function (Mobility) balance;endurance/activity tolerance;shortness of breath  -CS     Row Name 08/02/22 1500          Balance    Balance Assessment standing dynamic balance  -CS     Dynamic Standing Balance standby assist;contact guard  -CS     Position/Device Used, Standing Balance unsupported  -CS     Row Name 08/02/22 1500          Plan of Care Review    Plan of Care Reviewed With patient  -CS     Progress no change  -CS     Outcome Evaluation Pt presents with limitations that impede their ability to safely and independently transfer and ambulate. The skills of a therapist will be required to safely and effectively implement the following treatment plan to restore maximal level of function.  -     Row Name 08/02/22 1500          Therapy Assessment/Plan (PT)    Rehab Potential (PT) good, to achieve stated therapy goals  -CS     Criteria for Skilled Interventions Met (PT) yes;skilled treatment is necessary  -CS     Therapy Frequency (PT) daily  -CS     Predicted Duration of Therapy Intervention (PT) 10 days  -CS     Problem List (PT) problems related to;balance;mobility;strength  -CS     Activity Limitations Related to Problem List (PT) unable to ambulate safely;unable to transfer safely  -     Row Name 08/02/22 1500          PT Evaluation Complexity    History, PT Evaluation Complexity 1-2 personal factors and/or comorbidities  -CS     Examination of Body Systems (PT Eval Complexity) total of 4 or more elements  -CS     Clinical Presentation (PT Evaluation Complexity)  stable  -CS     Clinical Decision Making (PT Evaluation Complexity) low complexity  -CS     Overall Complexity (PT Evaluation Complexity) low complexity  -CS     Row Name 08/02/22 1500          Therapy Plan Review/Discharge Plan (PT)    Therapy Plan Review (PT) evaluation/treatment results reviewed;patient;daughter  -CS     Row Name 08/02/22 1500          Physical Therapy Goals    Bed Mobility Goal Selection (PT) bed mobility, PT goal 1  -CS     Transfer Goal Selection (PT) transfer, PT goal 1  -CS     Gait Training Goal Selection (PT) gait training, PT goal 1  -CS     Row Name 08/02/22 1500          Bed Mobility Goal 1 (PT)    Activity/Assistive Device (Bed Mobility Goal 1, PT) bed mobility activities, all  -CS     [place] Level/Cues Needed (Bed Mobility Goal 1, PT) independent  -CS     Time Frame (Bed Mobility Goal 1, PT) long term goal (LTG);10 days  -CS     Row Name 08/02/22 1500          Transfer Goal 1 (PT)    Activity/Assistive Device (Transfer Goal 1, PT) sit-to-stand/stand-to-sit;bed-to-chair/chair-to-bed  -CS     [place] Level/Cues Needed (Transfer Goal 1, PT) independent  -CS     Time Frame (Transfer Goal 1, PT) long term goal (LTG);10 days  -CS     Row Name 08/02/22 1500          Gait Training Goal 1 (PT)    Activity/Assistive Device (Gait Training Goal 1, PT) gait (walking locomotion)  -CS     [place] Level (Gait Training Goal 1, PT) independent  -CS     Distance (Gait Training Goal 1, PT) 150  -CS     Time Frame (Gait Training Goal 1, PT) long term goal (LTG);10 days  -CS           User Key  (r) = Recorded By, (t) = Taken By, (c) = Cosigned By    Initials Name Provider Type    CS Dominique Dixon PT Physical Therapist                Physical Therapy Education                 Title: PT OT SLP Therapies (In Progress)     Topic: Physical Therapy (In Progress)     Point: Mobility training (In Progress)     Learning Progress Summary           Patient Acceptance, E, NR by LAURI at 8/2/2022 4713                    Point: Home exercise program (Not Started)     Learner Progress:  Not documented in this visit.          Point: Body mechanics (Not Started)     Learner Progress:  Not documented in this visit.          Point: Precautions (In Progress)     Learning Progress Summary           Patient Acceptance, E, NR by LAURI at 8/2/2022 1602                               User Key     Initials Effective Dates Name Provider Type Discipline     04/25/21 -  Dominique Dixon, PT Physical Therapist PT              PT Recommendation and Plan  Anticipated Discharge Disposition (PT): home with home health, skilled nursing facility, home with assist  Planned Therapy Interventions (PT): balance training, bed mobility training, gait training, transfer training, strengthening  Therapy Frequency (PT): daily  Plan of Care Reviewed With: patient  Progress: no change  Outcome Evaluation: Pt presents with limitations that impede their ability to safely and independently transfer and ambulate. The skills of a therapist will be required to safely and effectively implement the following treatment plan to restore maximal level of function.   Outcome Measures     Row Name 08/02/22 1500             How much help from another person do you currently need...    Turning from your back to your side while in flat bed without using bedrails? 3  -CS      Moving from lying on back to sitting on the side of a flat bed without bedrails? 3  -CS      Moving to and from a bed to a chair (including a wheelchair)? 3  -CS      Standing up from a chair using your arms (e.g., wheelchair, bedside chair)? 3  -CS      Climbing 3-5 steps with a railing? 3  -CS      To walk in hospital room? 3  -CS      AM-PAC 6 Clicks Score (PT) 18  -CS              Functional Assessment    Outcome Measure Options AM-PAC 6 Clicks Basic Mobility (PT)  -            User Key  (r) = Recorded By, (t) = Taken By, (c) = Cosigned By    Initials Name Provider Type    CS Destiny  Dominique, JOSE LUIS Physical Therapist                 Time Calculation:    PT Charges     Row Name 08/02/22 1556             Time Calculation    PT Received On 08/02/22  -CS      PT Goal Re-Cert Due Date 08/11/22  -CS              Untimed Charges    PT Eval/Re-eval Minutes 46  -CS              Total Minutes    Untimed Charges Total Minutes 46  -CS       Total Minutes 46  -CS            User Key  (r) = Recorded By, (t) = Taken By, (c) = Cosigned By    Initials Name Provider Type    CS Dominique Dixon, JOSE LUIS Physical Therapist              Therapy Charges for Today     Code Description Service Date Service Provider Modifiers Qty    [id number] HC PT EVAL LOW COMPLEXITY 4 8/2/2022 Dominique Dixon PT GP 1          PT G-Codes  Outcome Measure Options: AM-PAC 6 Clicks Basic Mobility (PT)  AM-PAC 6 Clicks Score (PT): 18  AM-PAC 6 Clicks Score (OT): 21    Dominique Dixon PT  8/2/2022

## 2022-08-02 NOTE — THERAPY EVALUATION
Patient Name: Teto Castle  : 1940    MRN: [id number]                              Today's Date: 2022       Admit Date: 2022    Visit Dx:     ICD-10-CM ICD-9-CM   1. Altered mental status, unspecified altered mental status type  R41.82 780.97   2. Acute UTI  N39.0 599.0   3. Decreased activities of daily living (ADL)  Z78.9 V49.89     Patient Active Problem List   Diagnosis   • BPH with obstruction/lower urinary tract symptoms   • Essential hypertension   • Type 2 diabetes mellitus without complication (HCC)   • Thrombocytopenia (HCC)   • B12 deficiency   • Syncope and collapse   • Altered mental status, unspecified altered mental status type     Past Medical History:   Diagnosis Date   • Aneurysm of aorta (HCC)    • Anxiety and depression    • Arthritis    • BPH (benign prostatic hyperplasia)    • Diabetes mellitus (HCC)    • Hard of hearing    • History of frequent urinary tract infections    • History of MI (myocardial infarction)     STATES WAS TOLD HE HAD IN PAST, NEVER SAW CARDIOLOGY   • Hypertension    • Pulmonary nodules      Past Surgical History:   Procedure Laterality Date   • CATARACT EXTRACTION, BILATERAL     • COLONOSCOPY  approx     normal per patient   • CYSTOSCOPY TRANSURETHRAL RESECTION OF PROSTATE N/A 9/10/2018    Procedure: TRANSURETHRAL RESECTION OF PROSTATE;  Surgeon: Art Dickerson MD;  Location: [facility] OR;  Service: Urology   • ENDOSCOPIC FUNCTIONAL SINUS SURGERY (FESS) N/A 5/15/2019    Procedure: ETHMOIDECTOMY,LEFT INTERNASAL  ANTROSTOMY;  Surgeon: Mark Linda MD;  Location: [facility];  Service: ENT   • FRACTURE SURGERY      LT ARM   • HEMORRHOIDECTOMY     • SEPTOPLASTY N/A 5/15/2019    Procedure: NASAL SEPTOPLASTY,;  Surgeon: Mark Linda MD;  Location: [facility];  Service: ENT      General Information     Row Name 22 1441          OT Time and Intention    Document Type evaluation  -LF     Mode of Treatment individual  therapy;occupational therapy  -     Row Name 08/02/22 1441          General Information    Patient Profile Reviewed yes  -LF     Prior Level of Function --  Independent with ADLs, ambulated without a device, goes to his daughter's house to shower where she has a walk-in shower and grab bars in place, he stands to shower, has an elevated commode, stands to groom, drives, and no home O2.  -     Existing Precautions/Restrictions fall  -     Barriers to Rehab none identified  -     Row Name 08/02/22 1441          Occupational Profile    Reason for Services/Referral (Occupational Profile) Patient is an 81 year old man admitted to [facility] on August 1st, 2022 for altered mental status. He is currently on 3 [place]/room air. Occupational therapy consulted due to recent decline in ADLs/functional transfers. No previous occupational therapy services for current condition.  -     Row Name 08/02/22 1441          Living Environment    People in Home spouse  -     Row Name 08/02/22 1441          Home Main Entrance    Number of Stairs, Main Entrance none  -     Row Name 08/02/22 1441          Stairs Within Home, Primary    Number of Stairs, Within Home, Primary none  -     Row Name 08/02/22 1441          Cognition    Orientation Status (Cognition) oriented x 4  -     Row Name 08/02/22 1441          Safety Issues, Functional Mobility    Impairments Affecting Function (Mobility) balance;endurance/activity tolerance;shortness of breath  -           User Key  (r) = Recorded By, (t) = Taken By, (c) = Cosigned By    Initials Name Provider Type     Emily Smith OT Occupational Therapist                 Mobility/ADL's     Row Name 08/02/22 1445          Bed Mobility    Bed Mobility bed mobility (all) activities  -     All Activities, [place] (Bed Mobility) modified independence  -     Row Name 08/02/22 1445          Transfers    Transfers sit-stand transfer;stand-sit transfer  -      Sit-Stand [place] (Transfers) standby assist;supervision  -     Stand-Sit [place] (Transfers) standby assist;supervision  -LF     Row Name 08/02/22 1445          Activities of Daily Living    BADL Assessment/Intervention bathing;upper body dressing;lower body dressing;grooming;feeding;toileting  -LF     Row Name 08/02/22 1445          Bathing Assessment/Intervention    [place] Level (Bathing) bathing skills;upper body;set up;lower body;standby assist  -LF     Row Name 08/02/22 1445          Upper Body Dressing Assessment/Training    [place] Level (Upper Body Dressing) upper body dressing skills;set up  -LF     Row Name 08/02/22 1445          Lower Body Dressing Assessment/Training    [place] Level (Lower Body Dressing) lower body dressing skills;standby assist  -LF     Row Name 08/02/22 1445          Grooming Assessment/Training    [place] Level (Grooming) grooming skills;set up  -LF     Row Name 08/02/22 1445          Self-Feeding Assessment/Training    [place] Level (Feeding) feeding skills;set up  -LF     Row Name 08/02/22 1445          Toileting Assessment/Training    [place] Level (Toileting) toileting skills;standby assist  -           User Key  (r) = Recorded By, (t) = Taken By, (c) = Cosigned By    Initials Name Provider Type     Emily Smith OT Occupational Therapist               Obj/Interventions     Row Name 08/02/22 1448          Sensory Assessment (Somatosensory)    Sensory Assessment (Somatosensory) UE sensation intact  -LF     Row Name 08/02/22 1448          Vision Assessment/Intervention    Visual Impairment/Limitations WFL  -LF     Row Name 08/02/22 1448          Range of Motion Comprehensive    General Range of Motion bilateral upper extremity ROM WFL  -LF     Row Name 08/02/22 1448          Strength Comprehensive (MMT)    Comment, General Manual Muscle Testing (MMT) Assessment 5/5 BUEs  -LF     Row Name 08/02/22 1448          Motor Skills     Motor Skills coordination;functional endurance  -LF     Coordination WFL  -LF     Functional Endurance Fair  -LF     Row Name 08/02/22 1448          Balance    Balance Assessment sitting dynamic balance;standing dynamic balance  -LF     Dynamic Sitting Balance independent  -LF     Position, Sitting Balance unsupported;sitting edge of bed  -LF     Dynamic Standing Balance standby assist;supervision  -LF     Position/Device Used, Standing Balance unsupported  -LF           User Key  (r) = Recorded By, (t) = Taken By, (c) = Cosigned By    Initials Name Provider Type     Emily Smith OT Occupational Therapist               Goals/Plan     Row Name 08/02/22 1454          Bed Mobility Goal 1 (OT)    Activity/Assistive Device (Bed Mobility Goal 1, OT) bed mobility activities, all  -LF     [place] Level/Cues Needed (Bed Mobility Goal 1, OT) modified independence  -LF     Time Frame (Bed Mobility Goal 1, OT) long term goal (LTG);10 days  -     Row Name 08/02/22 1454          Transfer Goal 1 (OT)    Activity/Assistive Device (Transfer Goal 1, OT) transfers, all  -LF     [place] Level/Cues Needed (Transfer Goal 1, OT) modified independence  -LF     Time Frame (Transfer Goal 1, OT) long term goal (LTG);10 days  -     Row Name 08/02/22 1454          Bathing Goal 1 (OT)    Activity/Device (Bathing Goal 1, OT) bathing skills, all  -LF     [place] Level/Cues Needed (Bathing Goal 1, OT) modified independence  -LF     Time Frame (Bathing Goal 1, OT) long term goal (LTG);10 days  -     Row Name 08/02/22 1454          Dressing Goal 1 (OT)    Activity/Device (Dressing Goal 1, OT) dressing skills, all  -LF     [place]/Cues Needed (Dressing Goal 1, OT) modified independence  -LF     Time Frame (Dressing Goal 1, OT) long term goal (LTG);10 days  -     Row Name 08/02/22 1454          Toileting Goal 1 (OT)    Activity/Device (Toileting Goal 1, OT) toileting skills, all  -LF     [place] Level/Cues Needed  (Toileting Goal 1, OT) modified independence  -     Time Frame (Toileting Goal 1, OT) long term goal (LTG);10 days  -     Row Name 08/02/22 1454          Therapy Assessment/Plan (OT)    Planned Therapy Interventions (OT) activity tolerance training;patient/caregiver education/training;BADL retraining;functional balance retraining;occupation/activity based interventions;ROM/therapeutic exercise;strengthening exercise;transfer/mobility retraining  -           User Key  (r) = Recorded By, (t) = Taken By, (c) = Cosigned By    Initials Name Provider Type     Emily Smith OT Occupational Therapist               Clinical Impression     Row Name 08/02/22 1451          Pain Assessment    Additional Documentation Pain Scale: FACES Pre/Post-Treatment (Group)  -[facility] Name 08/02/22 1451          Pain Scale: FACES Pre/Post-Treatment    Pain: FACES Scale, Pretreatment 0-->no hurt  -     Posttreatment Pain Rating 0-->no hurt  -[facility] Name 08/02/22 1451          Plan of Care Review    Plan of Care Reviewed With patient  -     Progress no change  -     Outcome Evaluation Patient presents with limitations in self-care, functional transfers, balance, and endurance. He would benefit from continued skilled occupational therapy services to maximize independence with ADLs/functional transfers.  -     Row Name 08/02/22 1451          Therapy Assessment/Plan (OT)    Patient/Family Therapy Goal Statement (OT) To maximize independence.  -     Rehab Potential (OT) good, to achieve stated therapy goals  -     Criteria for Skilled Therapeutic Interventions Met (OT) yes;meets criteria;skilled treatment is necessary  -     Therapy Frequency (OT) 5 times/wk  -     Row Name 08/02/22 1451          Therapy Plan Review/Discharge Plan (OT)    Anticipated Discharge Disposition (OT) home with home health;home with assist  -     Row Name 08/02/22 1451          Vital Signs    O2 Delivery Pre Treatment room air  -      O2 Delivery Intra Treatment room air  -LF     O2 Delivery Post Treatment room air  -LF           User Key  (r) = Recorded By, (t) = Taken By, (c) = Cosigned By    Initials Name Provider Type    LF Emily Smith OT Occupational Therapist               Outcome Measures     Row Name 08/02/22 0595          How much help from another is currently needed...    Putting on and taking off regular lower body clothing? 3  -LF     Bathing (including washing, rinsing, and drying) 3  -LF     Toileting (which includes using toilet bed pan or urinal) 3  -LF     Putting on and taking off regular upper body clothing 4  -LF     Taking care of personal grooming (such as brushing teeth) 4  -LF     Eating meals 4  -LF     AM-PAC 6 Clicks Score (OT) 21  -LF     Row Name 08/02/22 0900          How much help from another person do you currently need...    Turning from your back to your side while in flat bed without using bedrails? 3  -HW     Moving from lying on back to sitting on the side of a flat bed without bedrails? 3  -HW     Moving to and from a bed to a chair (including a wheelchair)? 3  -HW     Standing up from a chair using your arms (e.g., wheelchair, bedside chair)? 3  -HW     Climbing 3-5 steps with a railing? 2  -HW     To walk in hospital room? 3  -HW     AM-PAC 6 Clicks Score (PT) 17  -HW     Highest level of mobility 5 --> Static standing  -HW     Row Name 08/02/22 1455          Functional Assessment    Outcome Measure Options AM-PAC 6 Clicks Daily Activity (OT);Optimal Instrument  -LF     Row Name 08/02/22 5305          Optimal Instrument    Optimal Instrument Optimal - 3  -LF     Bending/Stooping 2  -LF     Standing 2  -LF     Reaching 1  -LF     From the list, choose the 3 activities you would most like to be able to do without any difficulty Bending/stooping;Standing;Reaching  -LF     Total Score Optimal - 3 5  -LF           User Key  (r) = Recorded By, (t) = Taken By, (c) = Cosigned By    Initials Name Provider  Type    HW Luci Garcia, RN Registered Nurse     Emily Smith OT Occupational Therapist                Occupational Therapy Education                 Title: PT OT SLP Therapies (Done)     Topic: Occupational Therapy (Done)     Point: ADL training (Done)     Description:   Instruct learner(s) on proper safety adaptation and remediation techniques during self care or transfers.   Instruct in proper use of assistive devices.              Learning Progress Summary           Patient Acceptance, E,TB, VU by  at 8/2/2022 1456                   Point: Precautions (Done)     Description:   Instruct learner(s) on prescribed precautions during self-care and functional transfers.              Learning Progress Summary           Patient Acceptance, E,TB, VU by  at 8/2/2022 1456                   Point: Body mechanics (Done)     Description:   Instruct learner(s) on proper positioning and spine alignment during self-care, functional mobility activities and/or exercises.              Learning Progress Summary           Patient Acceptance, E,TB, VU by  at 8/2/2022 1456                               User Key     Initials Effective Dates Name Provider Type Discipline     06/16/21 -  Emily Smith OT Occupational Therapist OT              OT Recommendation and Plan  Planned Therapy Interventions (OT): activity tolerance training, patient/caregiver education/training, BADL retraining, functional balance retraining, occupation/activity based interventions, ROM/therapeutic exercise, strengthening exercise, transfer/mobility retraining  Therapy Frequency (OT): 5 times/wk  Plan of Care Review  Plan of Care Reviewed With: patient  Progress: no change  Outcome Evaluation: Patient presents with limitations in self-care, functional transfers, balance, and endurance. He would benefit from continued skilled occupational therapy services to maximize independence with ADLs/functional transfers.     Time Calculation:    Time  Calculation- OT     Row Name 08/02/22 1457             Time Calculation- OT    OT Received On 08/02/22  -LF      OT Goal Re-Cert Due Date 08/11/22  -LF              Untimed Charges    OT Eval/Re-eval Minutes 34  -LF              Total Minutes    Untimed Charges Total Minutes 34  -LF       Total Minutes 34  -LF            User Key  (r) = Recorded By, (t) = Taken By, (c) = Cosigned By    Initials Name Provider Type     Emily Smith OT Occupational Therapist              Therapy Charges for Today     Code Description Service Date Service Provider Modifiers Qty    [id number] HC OT EVAL LOW COMPLEXITY 3 8/2/2022 Emily Smith OT GO 1               Emily Smith OT  8/2/2022

## 2022-08-02 NOTE — H&P
[facility] HISTORY AND PHYSICAL  Date: 2022   Patient Name: Teto Castle  : 1940  MRN: [id number]  Primary Care Physician:  Cam Dickey MD  Date of admission: 2022    Subjective Altered mental status  Subjective     Chief Complaint: Altered mental status    HPI: Patient is an 81-year-old man with a past medical history of depression, arthritis, BPH, diabetes, frequent UTIs, hypertension that has been brought in here by his daughter for urinary tract infection and confusion.  Patient exhibited some confusion last night.  Last time, the patient got a UTI he became septic.    On arrival to the ED, patient temperature 98.7, pulse of 87, respiratory rate of 19, blood pressure 130/109 he was saturating 98% on room air.  Patient's chest x-ray is clear.  Patient's confusion is cleared up.    On labs, patient does not have leukocytosis.  His glucose is 135, sodium 131, CO2 20.  UA shows UTI.  There is a moderate amount of leuk esterase.  Small amount of blood.  His lactate is 2.6 initially and it went up to 3.0.    Personal History     Past Medical History:  Past Medical History:   Diagnosis Date   • Aneurysm of aorta (HCC)    • Anxiety and depression    • Arthritis    • BPH (benign prostatic hyperplasia)    • Diabetes mellitus (HCC)    • Hard of hearing    • History of frequent urinary tract infections    • History of MI (myocardial infarction)     STATES WAS TOLD HE HAD IN PAST, NEVER SAW CARDIOLOGY   • Hypertension    • Pulmonary nodules          Past Surgical History:  Past Surgical History:   Procedure Laterality Date   • CATARACT EXTRACTION, BILATERAL     • COLONOSCOPY  approx     normal per patient   • CYSTOSCOPY TRANSURETHRAL RESECTION OF PROSTATE N/A 9/10/2018    Procedure: TRANSURETHRAL RESECTION OF PROSTATE;  Surgeon: Art Dickerson MD;  Location: [facility];  Service: Urology   • ENDOSCOPIC FUNCTIONAL SINUS SURGERY (FESS) N/A 5/15/2019    Procedure:  ETHMOIDECTOMY,LEFT INTERNASAL  ANTROSTOMY;  Surgeon: Mark Linda MD;  [place] [facility] [place] [facility]  Service: ENT   • FRACTURE SURGERY      LT ARM   • HEMORRHOIDECTOMY     • SEPTOPLASTY N/A 5/15/2019    Procedure: NASAL SEPTOPLASTY,;  Surgeon: Mark Linda MD;  [place] [facility] [place] [facility]  Service: ENT         Family History:   Family History   Problem Relation Age of Onset   • Malig Hyperthermia Neg Hx        Social History:   Social History     Tobacco Use   • Smoking status: Former Smoker     Types: Cigars, Cigarettes   • Smokeless tobacco: Current User     Types: Chew   • Tobacco comment: 25 YEARS AGO   Substance Use Topics   • Alcohol use: No   • Drug use: No       Home Medications:  Cariprazine HCl, Insulin Degludec, SITagliptin, atorvastatin, finasteride, lisinopril, melatonin, meloxicam, metFORMIN, and pioglitazone    Allergies:  Allergies   Allergen Reactions   • Morphine Itching and Confusion   • Penicillins Other (See Comments)     unknown       Review of Systems   All systems were reviewed and negative except for: Confusion    Objective   Objective     Vitals:   Temp:  [98.7 °F (37.1 °C)] 98.7 °F (37.1 °C)  Heart Rate:  [70-87] 71  Resp:  [19] 19  BP: (125-130)/() 125/76    Physical Exam    Constitutional: Awake, alert, no acute distress   Eyes: Pupils equal, sclerae anicteric, no conjunctival injection   HENT: NCAT, mucous membranes moist   Neck: Supple, no thyromegaly, no lymphadenopathy, trachea midline   Respiratory: Clear to auscultation bilaterally, nonlabored respirations    Cardiovascular: RRR, no murmurs, rubs, or gallops, palpable pedal pulses bilaterally   Gastrointestinal: Positive bowel sounds, soft, nontender, nondistended   Musculoskeletal: No bilateral ankle edema, no clubbing or cyanosis to extremities   Psychiatric: Appropriate affect, cooperative   Neurologic: Oriented x 3, strength symmetric in all extremities, Cranial Nerves grossly intact to confrontation, speech  clear   Skin: No rashes     Result Review    Result Review:  I have personally reviewed the results from the time of this admission to 8/1/2022 23:49 EDT and agree with these findings:  [x]  Laboratory  [x]  Microbiology  [x]  Radiology  []  EKG/Telemetry   []  Cardiology/Vascular   []  Pathology  [x]  Old records  []  Other:      Assessment & Plan   Assessment / Plan   #1 UTI with mild encephalopathy  -IV fluids  -Started on Rocephin  -Trend lactic    #2 hyperlipidemia continue Lipitor    #3 BPH continue Proscar    #4 insulin-dependent diabetes continue lispro    #5 hypertension continue lisinopril.    #6 depression continue Vraylar.    #7 Elevated lactic acid  -patient on metformin at home?       DVT prophylaxis:  Medical DVT prophylaxis orders are present.    CODE STATUS:    Level Of Support Discussed With: Patient  Code Status (Patient has no pulse and is not breathing): CPR (Attempt to Resuscitate)  Medical Interventions (Patient has pulse or is breathing): Full Support      Admission Status:  I believe this patient meets observation status.    Electronically signed by Anni Blancas DO, 08/01/22, 11:49 PM EDT.

## 2022-08-02 NOTE — PROGRESS NOTES
[facility]   Hospitalist Progress Note  Date: 8/2/2022  Patient Name: Teto Castle    Subjective   Subjective     Chief Complaint:   Altered mental status    Summary:   81-year-old male with dementia and frequent confusion hallucinations was brought into the emergency department for concern of UTI.  Diagnosed with COVID-19 and possible UTI.  Patient has been on Keflex for suppression and a recent 3 to 5-day course of Bactrim as an outpatient for his UTI symptoms.    Interval Followup: Patient remains confused today but more interactive.  Caregivers are at the bedside report that he is improving but deafly not at his baseline.    Review of Systems   No nausea vomiting no chest pain    Objective   Objective     Vitals:   Temp:  [97.6 °F (36.4 °C)-98.4 °F (36.9 °C)] 98.4 °F (36.9 °C)  Heart Rate:  [70-89] 79  Resp:  [14-16] 16  BP: (122-159)/(69-80) 128/73  Physical Exam   Gen: NAD, WDWN male sitting up on the side of the bed talking with his caregiver  HEENT: NCAT, mmm  Resp: CTAB no wrr, no dyspnea  CV: RRR no mrg, no LE edema  GI: Abdomen soft NT, ND +bs  Psych: Alert, oriented to person but not place or time      Result Review    Result Review:  I have personally reviewed the results from the time of this admission to 8/2/2022 18:43 EDT and agree with these findings:  [x]  Laboratory creatinine 1  Lactic acid steady at 3  Platelets 127  [x]  Microbiology  COVID positive  Urine culture positive for gram-negative bacilli  [x]  Radiology chest x-ray: Negative  [x]  EKG/Telemetry telemetry personally reviewed: Sinus rhythm 90 with no acute events  []  Cardiology/Vascular   []  Pathology  []  Old records  []  Other:    Assessment & Plan   Assessment / Plan     Assessment/Plan:  • Acute metabolic encephalopathy due to UTI  • COVID-19 infection  • Acute UTI  • Elevated lactic acid  • Hyperlipidemia  • BPH  • Diabetes  • Hypertension  • Depression      Plan:   • Continue Rocephin 1 g daily for UTI for now, await  culture results  • COVID-19 currently asymptomatic other than confusion, no need for steroids  • Continue Proscar, lisinopril meloxicam atorvastatin  • Discontinue IV fluids tonight so patient can sleep, reassess labs tomorrow  • Adding trazodone to help with nightly behavior while in the hospital  • Discontinue cardiac monitoring    DVT ppx: Lovenox  Diet: Regular  Discussed with bedside RN

## 2022-08-02 NOTE — PLAN OF CARE
Goal Outcome Evaluation:  Plan of Care Reviewed With: patient        Progress: no change  Outcome Evaluation: Patient presents with limitations in self-care, functional transfers, balance, and endurance. He would benefit from continued skilled occupational therapy services to maximize independence with ADLs/functional transfers.

## 2022-08-02 NOTE — CASE MANAGEMENT/SOCIAL WORK
Discharge Planning Assessment   [facility]     Patient Name: Teto Castle  MRN: [id number]  Today's Date: 8/2/2022    Admit Date: 8/1/2022     Discharge Needs Assessment     Row Name 08/02/22 0916       Living Environment    People in Home spouse    Current Living Arrangements home    Primary Care Provided by self    Provides Primary Care For no one    Family Caregiver if Needed child(juancho), adult    Quality of Family Relationships helpful;involved;supportive    Able to Return to Prior Arrangements yes       Resource/Environmental Concerns    Resource/Environmental Concerns none    Transportation Concerns none       Transition Planning    Patient/Family Anticipates Transition to home with family;home with help/services    Patient/Family Anticipated Services at Transition home health care    Transportation Anticipated family or friend will provide       Discharge Needs Assessment    Readmission Within the Last 30 Days no previous admission in last 30 days    Equipment Currently Used at Home none    Concerns to be Addressed discharge planning    Anticipated Changes Related to Illness none    Equipment Needed After Discharge none    Discharge Facility/Level of Care Needs home with home health    Current Discharge Risk dependent with mobility/activities of daily living               Discharge Plan     Row Name 08/02/22 0917       Plan    Plan Per patient request, FAMILIA RN completed assessment with daughter via phone who is at bedside with patient at this time. Verified demographics and preferred pharmacy. Patient resides with spouse and daughters are able to assist patient with any needs he may have. Patient does not use at [facility] at this time. PT/OT has been ordered and pending, daughter stated that patient might benefit from [facility], would like to discuss with patient first. FAMILIA RN will follow up to see if they would like a referral made. Ultimate goal is to return home. Will follow up with patient and family once evaluated  by therapy.              Continued Care and Services - Admitted Since 8/1/2022    Coordination has not been started for this encounter.          Demographic Summary     Row Name 08/02/22 0913       General Information    Admission Type observation    Arrived From emergency department    Referral Source nursing    Reason for Consult discharge planning    Preferred Language English       Contact Information    Permission Granted to Share Info With family/designee;               Functional Status     Row Name 08/02/22 0914       Functional Status    Usual Activity Tolerance good    Current Activity Tolerance moderate       Assessment of Health Literacy    How often do you have someone help you read hospital materials? Occasionally    How often do you have problems learning about your medical condition because of difficulty understanding written information? Occasionally    How often do you have a problem understanding what is told to you about your medical condition? Occasionally    How confident are you filling out medical forms by yourself? Quite a bit    Health Literacy Good       Functional Status, IADL    Medications independent    Meal Preparation independent    Housekeeping independent    Laundry independent    Shopping assistive person       Mental Status    General Appearance WDL WDL       Mental Status Summary    Recent Changes in Mental Status/Cognitive Functioning no changes               Psychosocial    No documentation.                Abuse/Neglect    No documentation.                Legal    No documentation.                Substance Abuse    No documentation.                Patient Forms    No documentation.                   Marci Jones RN

## 2022-08-02 NOTE — PLAN OF CARE
Problem: Adult Inpatient Plan of Care  Goal: Plan of Care Review  Outcome: Ongoing, Progressing  Goal: Patient-Specific Goal (Individualized)  Outcome: Ongoing, Progressing  Goal: Absence of Hospital-Acquired Illness or Injury  Outcome: Ongoing, Progressing  Intervention: Identify and Manage Fall Risk  Recent Flowsheet Documentation  Taken 8/2/2022 0200 by Caterina Roman RN  Safety Promotion/Fall Prevention:   activity supervised   clutter free environment maintained   lighting adjusted   room organization consistent   safety round/check completed   toileting scheduled  Taken 8/2/2022 0137 by Caterina Roman RN  Safety Promotion/Fall Prevention: safety round/check completed  Intervention: Prevent Skin Injury  Recent Flowsheet Documentation  Taken 8/2/2022 0200 by Caterina Roman RN  Body Position: position changed independently  Intervention: Prevent and Manage VTE (Venous Thromboembolism) Risk  Recent Flowsheet Documentation  Taken 8/2/2022 0200 by Caterina Roman RN  Activity Management:   activity adjusted per tolerance   up ad ladan  VTE Prevention/Management: (see mar) --  Range of Motion: active ROM (range of motion) encouraged  Intervention: Prevent Infection  Recent Flowsheet Documentation  Taken 8/2/2022 0200 by Caterina Roman RN  Infection Prevention:   environmental surveillance performed   equipment surfaces disinfected   hand hygiene promoted   personal protective equipment utilized   rest/sleep promoted   single patient room provided   visitors restricted/screened  Goal: Optimal Comfort and Wellbeing  Outcome: Ongoing, Progressing  Intervention: Monitor Pain and Promote Comfort  Recent Flowsheet Documentation  Taken 8/2/2022 0200 by Caterina Roman RN  Pain Management Interventions: no interventions per patient request  Intervention: Provide Person-Centered Care  Recent Flowsheet Documentation  Taken 8/2/2022 0200 by Caterina Roman RN  Trust Relationship/Rapport:   care explained   choices provided   emotional  support provided   empathic listening provided   questions answered   questions encouraged   reassurance provided   thoughts/feelings acknowledged  Goal: Readiness for Transition of Care  Outcome: Ongoing, Progressing  Intervention: Mutually Develop Transition Plan  Recent Flowsheet Documentation  Taken 8/2/2022 0255 by Caterina Roman RN  Equipment Currently Used at Home: none  Taken 8/2/2022 0147 by Caterina Roman RN  Transportation Anticipated: family or friend will provide  Transportation Concerns: none  Patient/Family Anticipated Services at Transition: none  Patient/Family Anticipates Transition to: home with family     Problem: Fall Injury Risk  Goal: Absence of Fall and Fall-Related Injury  Outcome: Ongoing, Progressing  Intervention: Promote Injury-Free Environment  Recent Flowsheet Documentation  Taken 8/2/2022 0200 by Caterina Roman RN  Safety Promotion/Fall Prevention:   activity supervised   clutter free environment maintained   lighting adjusted   room organization consistent   safety round/check completed   toileting scheduled  Taken 8/2/2022 0137 by Caterina Roman RN  Safety Promotion/Fall Prevention: safety round/check completed     Problem: Skin Injury Risk Increased  Goal: Skin Health and Integrity  Outcome: Ongoing, Progressing  Intervention: Optimize Skin Protection  Recent Flowsheet Documentation  Taken 8/2/2022 0200 by Caterina Roman RN  Pressure Reduction Techniques:   frequent weight shift encouraged   heels elevated off bed   positioned off wounds   rest period provided between sit times   sit time limited to 1 hour   weight shift assistance provided  Head of Bed (HOB) Positioning: HOB elevated  Pressure Reduction Devices: positioning supports utilized   Goal Outcome Evaluation:         No change in status patient continues toward goals

## 2022-08-03 LAB
ALBUMIN SERPL-MCNC: 3.6 G/DL (ref 3.5–5.2)
ALBUMIN/GLOB SERPL: 1.2 G/DL
ALP SERPL-CCNC: 94 U/L (ref 39–117)
ALT SERPL W P-5'-P-CCNC: 46 U/L (ref 1–41)
ANION GAP SERPL CALCULATED.3IONS-SCNC: 7.4 MMOL/L (ref 5–15)
AST SERPL-CCNC: 68 U/L (ref 1–40)
BACTERIA SPEC AEROBE CULT: ABNORMAL
BASOPHILS # BLD AUTO: 0.03 10*3/MM3 (ref 0–0.2)
BASOPHILS NFR BLD AUTO: 0.4 % (ref 0–1.5)
BILIRUB SERPL-MCNC: 1.8 MG/DL (ref 0–1.2)
BUN SERPL-MCNC: 16 MG/DL (ref 8–23)
BUN/CREAT SERPL: 16.7 (ref 7–25)
CALCIUM SPEC-SCNC: 9.9 MG/DL (ref 8.6–10.5)
CHLORIDE SERPL-SCNC: 105 MMOL/L (ref 98–107)
CO2 SERPL-SCNC: 22.6 MMOL/L (ref 22–29)
CREAT SERPL-MCNC: 0.96 MG/DL (ref 0.76–1.27)
D-LACTATE SERPL-SCNC: 2 MMOL/L (ref 0.5–2)
DEPRECATED RDW RBC AUTO: 59.7 FL (ref 37–54)
EGFRCR SERPLBLD CKD-EPI 2021: 79.9 ML/MIN/1.73
EOSINOPHIL # BLD AUTO: 0.1 10*3/MM3 (ref 0–0.4)
EOSINOPHIL NFR BLD AUTO: 1.4 % (ref 0.3–6.2)
ERYTHROCYTE [DISTWIDTH] IN BLOOD BY AUTOMATED COUNT: 16.3 % (ref 12.3–15.4)
GLOBULIN UR ELPH-MCNC: 3 GM/DL
GLUCOSE BLDC GLUCOMTR-MCNC: 137 MG/DL (ref 70–99)
GLUCOSE BLDC GLUCOMTR-MCNC: 150 MG/DL (ref 70–99)
GLUCOSE BLDC GLUCOMTR-MCNC: 170 MG/DL (ref 70–99)
GLUCOSE SERPL-MCNC: 148 MG/DL (ref 65–99)
HCT VFR BLD AUTO: 38 % (ref 37.5–51)
HGB BLD-MCNC: 13.6 G/DL (ref 13–17.7)
IMM GRANULOCYTES # BLD AUTO: 0.08 10*3/MM3 (ref 0–0.05)
IMM GRANULOCYTES NFR BLD AUTO: 1.2 % (ref 0–0.5)
LYMPHOCYTES # BLD AUTO: 1.63 10*3/MM3 (ref 0.7–3.1)
LYMPHOCYTES NFR BLD AUTO: 23.6 % (ref 19.6–45.3)
MCH RBC QN AUTO: 36.3 PG (ref 26.6–33)
MCHC RBC AUTO-ENTMCNC: 35.8 G/DL (ref 31.5–35.7)
MCV RBC AUTO: 101.3 FL (ref 79–97)
MONOCYTES # BLD AUTO: 0.49 10*3/MM3 (ref 0.1–0.9)
MONOCYTES NFR BLD AUTO: 7.1 % (ref 5–12)
NEUTROPHILS NFR BLD AUTO: 4.57 10*3/MM3 (ref 1.7–7)
NEUTROPHILS NFR BLD AUTO: 66.3 % (ref 42.7–76)
NRBC BLD AUTO-RTO: 0 /100 WBC (ref 0–0.2)
PLATELET # BLD AUTO: 106 10*3/MM3 (ref 140–450)
PMV BLD AUTO: 9.2 FL (ref 6–12)
POTASSIUM SERPL-SCNC: 4.4 MMOL/L (ref 3.5–5.2)
PROT SERPL-MCNC: 6.6 G/DL (ref 6–8.5)
RBC # BLD AUTO: 3.75 10*6/MM3 (ref 4.14–5.8)
SODIUM SERPL-SCNC: 135 MMOL/L (ref 136–145)
WBC NRBC COR # BLD: 6.9 10*3/MM3 (ref 3.4–10.8)

## 2022-08-03 PROCEDURE — 82962 GLUCOSE BLOOD TEST: CPT

## 2022-08-03 PROCEDURE — 85025 COMPLETE CBC W/AUTO DIFF WBC: CPT | Performed by: HOSPITALIST

## 2022-08-03 PROCEDURE — 63710000001 INSULIN LISPRO (HUMAN) PER 5 UNITS: Performed by: HOSPITALIST

## 2022-08-03 PROCEDURE — G0378 HOSPITAL OBSERVATION PER HR: HCPCS

## 2022-08-03 PROCEDURE — 83605 ASSAY OF LACTIC ACID: CPT | Performed by: INTERNAL MEDICINE

## 2022-08-03 PROCEDURE — 80053 COMPREHEN METABOLIC PANEL: CPT | Performed by: INTERNAL MEDICINE

## 2022-08-03 PROCEDURE — 99225 PR SBSQ OBSERVATION CARE/DAY 25 MINUTES: CPT | Performed by: INTERNAL MEDICINE

## 2022-08-03 RX ORDER — GRANULES FOR ORAL 3 G/1
3 POWDER ORAL ONCE
Status: COMPLETED | OUTPATIENT
Start: 2022-08-03 | End: 2022-08-03

## 2022-08-03 RX ADMIN — MELOXICAM 7.5 MG: 7.5 TABLET ORAL at 10:00

## 2022-08-03 RX ADMIN — Medication 10 ML: at 10:00

## 2022-08-03 RX ADMIN — FOSFOMYCIN TROMETHAMINE 3 G: 3 GRANULE, FOR SOLUTION ORAL at 21:07

## 2022-08-03 RX ADMIN — INSULIN LISPRO 2 UNITS: 100 INJECTION, SOLUTION INTRAVENOUS; SUBCUTANEOUS at 12:59

## 2022-08-03 RX ADMIN — ATORVASTATIN CALCIUM 10 MG: 10 TABLET, FILM COATED ORAL at 10:00

## 2022-08-03 RX ADMIN — TRAZODONE HYDROCHLORIDE 50 MG: 50 TABLET ORAL at 21:06

## 2022-08-03 RX ADMIN — FINASTERIDE 5 MG: 5 TABLET, FILM COATED ORAL at 10:00

## 2022-08-03 RX ADMIN — Medication 10 ML: at 21:07

## 2022-08-03 RX ADMIN — LISINOPRIL 5 MG: 5 TABLET ORAL at 10:00

## 2022-08-03 RX ADMIN — INSULIN LISPRO 2 UNITS: 100 INJECTION, SOLUTION INTRAVENOUS; SUBCUTANEOUS at 17:35

## 2022-08-03 RX ADMIN — SENNOSIDES AND DOCUSATE SODIUM 2 TABLET: 50; 8.6 TABLET ORAL at 21:06

## 2022-08-03 RX ADMIN — SENNOSIDES AND DOCUSATE SODIUM 2 TABLET: 50; 8.6 TABLET ORAL at 10:00

## 2022-08-03 NOTE — PLAN OF CARE
Goal Outcome Evaluation:      Patient's blood sugars this shift have ranged 137-170s. Family remained at bedside throughout shift. Patient was alert and oriented x 2. Bladder scanned patient twice this shift. PVR this morning was 160. Second bladder scan showed 293 mL, however patient voided large amount in toilet immediately afterwards. VSS this shift.   Problem: Adult Inpatient Plan of Care  Goal: Plan of Care Review  Outcome: Ongoing, Progressing  Flowsheets (Taken 8/3/2022 1705)  Progress: improving  Goal: Patient-Specific Goal (Individualized)  Outcome: Ongoing, Progressing  Goal: Absence of Hospital-Acquired Illness or Injury  Outcome: Ongoing, Progressing  Intervention: Identify and Manage Fall Risk  Description: Perform standard risk assessment on admission using a validated tool or comprehensive approach appropriate to the patient; reassess fall risk frequently, with change in status or transfer to another level of care.  Communicate fall injury risk to interprofessional healthcare team.  Determine need for increased observation, equipment and environmental modification, such as low bed, signage and supportive, nonskid footwear.  Adjust safety measures to individual developmental age, stage and identified risk factors.  Reinforce the importance of safety and physical activity with patient and family.  Perform regular intentional rounding to assess need for position change, pain assessment and personal needs, including assistance with toileting.  Recent Flowsheet Documentation  Taken 8/3/2022 1000 by Jeanine Yang RN  Safety Promotion/Fall Prevention:   toileting scheduled   safety round/check completed   room organization consistent   nonskid shoes/slippers when out of bed   fall prevention program maintained   clutter free environment maintained   assistive device/personal items within reach  Intervention: Prevent Skin Injury  Description: Perform a screening for skin injury risk, such as pressure or  moisture associated skin damage on admission and at regular intervals throughout hospital stay.  Keep all areas of skin (especially folds) clean and dry.  Maintain adequate skin hydration.  Relieve and redistribute pressure and protect bony prominences; implement measures based on patient-specific risk factors.  Match turning and repositioning schedule to clinical condition.  Encourage weight shift frequently; assist with reposition if unable to complete independently.  Float heels off bed; avoid pressure on the Achilles tendon.  Keep skin free from extended contact with medical devices.  Encourage functional activity and mobility, as early as tolerated.  Use aids (e.g., slide boards, mechanical lift) during transfer.  Recent Flowsheet Documentation  Taken 8/3/2022 1000 by Jeanine Yang RN  Body Position: position changed independently  Skin Protection:   adhesive use limited   incontinence pads utilized   transparent dressing maintained   tubing/devices free from skin contact  Intervention: Prevent and Manage VTE (Venous Thromboembolism) Risk  Description: Assess for VTE (venous thromboembolism) risk.  Encourage and assist with early ambulation.  Initiate and maintain compression or other therapy, as indicated, based on identified risk in accordance with organizational protocol and provider order.  Encourage both active and passive leg exercises while in bed, if unable to ambulate.  Recent Flowsheet Documentation  Taken 8/3/2022 1000 by Jeanine Yang RN  Activity Management:   activity adjusted per tolerance   activity encouraged   ambulated in room   ambulated to bathroom  VTE Prevention/Management: (LOVENOX INJECTIONS -care home, RN) other (see comments)  Range of Motion: active ROM (range of motion) encouraged  Intervention: Prevent Infection  Description: Maintain skin and mucous membrane integrity; promote hand, oral and pulmonary hygiene.  Optimize fluid balance, nutrition, sleep and glycemic control to maximize  infection resistance.  Identify potential sources of infection early to prevent or mitigate progression of infection (e.g., wound, lines, devices).  Evaluate ongoing need for invasive devices; remove promptly when no longer indicated.  Recent Flowsheet Documentation  Taken 8/3/2022 1000 by Jeanine Yang RN  Infection Prevention:   visitors restricted/screened   single patient room provided   rest/sleep promoted   personal protective equipment utilized  Goal: Optimal Comfort and Wellbeing  Outcome: Ongoing, Progressing  Intervention: Monitor Pain and Promote Comfort  Description: Assess pain level, treatment efficacy and patient response at regular intervals using a consistent pain scale.  Consider the presence and impact of preexisting chronic pain.  Encourage patient and caregiver involvement in pain assessment, interventions and safety measures.  Recent Flowsheet Documentation  Taken 8/3/2022 1000 by Jeanine Yang RN  Pain Management Interventions:   quiet environment facilitated   care clustered  Intervention: Provide Person-Centered Care  Description: Use a family-focused approach to care.  Develop trust and rapport by proactively providing information, encouraging questions, addressing concerns and offering reassurance.  Acknowledge emotional response to hospitalization.  Recognize and utilize personal coping strategies.  Honor spiritual and cultural preferences.  Recent Flowsheet Documentation  Taken 8/3/2022 1000 by Jeanine Yang RN  Trust Relationship/Rapport:   care explained   choices provided   emotional support provided   empathic listening provided   questions answered   questions encouraged   reassurance provided   thoughts/feelings acknowledged  Goal: Readiness for Transition of Care  Outcome: Ongoing, Progressing     Problem: Fall Injury Risk  Goal: Absence of Fall and Fall-Related Injury  Outcome: Ongoing, Progressing  Intervention: Identify and Manage Contributors  Description: Develop a fall  prevention plan with the patient and caregiver/family.  Provide reorientation, appropriate sensory stimulation and routines with changes in mental status to decrease risk of fall.  Promote use of personal vision and auditory aids.  Assess assistance level required for safe and effective self-care; provide support as needed, such as toileting, mobilization. For age 65 and older, implement timed toileting with assistance.  Encourage physical activity, such as performance of mobility and self-care at highest level of patient ability, multicomponent exercise program and provision of appropriate assistive devices.  If fall occurs, assess the severity of injury; implement fall injury protocol. Determine the cause and revise fall injury prevention plan.  Regularly review medication contribution to fall risk; adjust medication administration times to minimize risk of falling.  Consider risk related to polypharmacy and age.  Balance adequate pain management with potential for oversedation.  Recent Flowsheet Documentation  Taken 8/3/2022 1000 by Jeanine Yang, RN  Medication Review/Management: medications reviewed  Self-Care Promotion:   independence encouraged   BADL personal objects within reach   BADL personal routines maintained  Intervention: Promote Injury-Free Environment  Description: Provide a safe, barrier-free environment that encourages independent activity.  Keep care area uncluttered and well-lighted.  Determine need for increased observation or monitoring.  Avoid use of devices that minimize mobility, such as restraints or indwelling urinary catheter.  Recent Flowsheet Documentation  Taken 8/3/2022 1000 by Jeanine Yang RN  Safety Promotion/Fall Prevention:   toileting scheduled   safety round/check completed   room organization consistent   nonskid shoes/slippers when out of bed   fall prevention program maintained   clutter free environment maintained   assistive device/personal items within reach     Problem:  Skin Injury Risk Increased  Goal: Skin Health and Integrity  Outcome: Ongoing, Progressing  Intervention: Optimize Skin Protection  Description: Perform a full pressure injury risk assessment, as indicated by screening, upon admission to care unit.  Reassess skin (injury risk, full inspection) frequently (e.g., scheduled interval, with change in condition) to provide optimal early detection and prevention.  Maintain adequate tissue perfusion (e.g., encourage fluid balance; avoid crossing legs, constrictive clothing or devices) to promote tissue oxygenation.  Maintain head of bed at lowest degree of elevation tolerated, considering medical condition and other restrictions.  Avoid positioning onto an area that remains reddened.  Minimize incontinence and moisture (e.g., toileting schedule; moisture-wicking pad, diaper or incontinence collection device; skin moisture barrier).  Cleanse skin promptly and gently when soiled utilizing a pH-balanced cleanser.  Relieve and redistribute pressure (e.g., scheduled position changes, weight shifts, use of support surface, medical device repositioning, protective dressing application, use of positioning device, microclimate control, use of pressure-injury-monitor  Encourage increased activity, such as sitting in a chair at the bedside or early mobilization, when able to tolerate.  Recent Flowsheet Documentation  Taken 8/3/2022 1000 by Jeanine Yang, RN  Pressure Reduction Techniques:   frequent weight shift encouraged   heels elevated off bed   sit time limited to 2 hours   pressure points protected  Pressure Reduction Devices: pressure-redistributing mattress utilized  Skin Protection:   adhesive use limited   incontinence pads utilized   transparent dressing maintained   tubing/devices free from skin contact        Progress: improving

## 2022-08-03 NOTE — PLAN OF CARE
Goal Outcome Evaluation:           Progress: no change  Outcome Evaluation: VSS. NO COMPLAINTS DURING SHIFT. PT RESTED WELL. DAUGHTER AT BEDSIDE.

## 2022-08-03 NOTE — PROGRESS NOTES
" [facility]   Hospitalist Progress Note  Date: 8/3/2022  Patient Name: Teto Castle    Subjective   Subjective     Chief Complaint:   Altered mental status    Summary:   81-year-old male with dementia and frequent confusion hallucinations was brought into the emergency department for concern of UTI.  Diagnosed with COVID-19 and possible UTI.  Patient has been on Keflex for suppression and a recent 3 to 5-day course of Bactrim as an outpatient for his UTI symptoms.    Interval Followup: Patient much less confused today.  Slept fairly well with trazodone last night.  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  Patient is hoping to go home tomorrow    Review of Systems   No nausea no vomiting no chest pain    Objective   Objective     Vitals:   Temp:  [97.6 °F (36.4 °C)-98.1 °F (36.7 °C)] 97.9 °F (36.6 °C)  Heart Rate:  [61-77] 76  Resp:  [14-18] 18  BP: ()/(54-70) 115/54  Physical Exam   Gen: NAD, WDWN lying in bed comfortably asleep easily awakened by voice  HEENT: NCAT, mmm  Resp: CTAB no wrr, no dyspnea  CV: RRR no mrg, no LE edema  GI: Abdomen soft NT, ND +bs  Psych: Alert, oriented to person and place but not date      Result Review    Result Review:  I have personally reviewed the results from the time of this admission to 8/3/2022 18:07 EDT and agree with these findings:  [x]  Laboratory creatinine 1  Lactic acid 2  Platelets 127 down to 106  [x]  Microbiology  COVID positive  Urine culture positive for ESBL E. coli  [x]  Radiology chest x-ray: Negative  []  EKG/Telemetry   []  Cardiology/Vascular   []  Pathology  []  Old records  []  Other:    Assessment & Plan   Assessment / Plan     Assessment/Plan:  • Acute metabolic encephalopathy due to UTI  • COVID-19 infection  • Acute UTI  • Elevated lactic acid  • Hyperlipidemia  • BPH  • Diabetes  • Hypertension  • Depression      Plan:   • Discontinue Rocephin, " transition to fosfomycin x1 for ESBL UTI.  Keflex prophylaxis will not be sufficient going forward  • COVID-19 currently asymptomatic other than confusion, no need for steroids  • Continue Proscar, lisinopril meloxicam atorvastatin  • Continue trazodone at night to help with sleep  • Monitor platelets in a.m., possible discharge tomorrow depending on progress    DVT ppx: Lovenox  Diet: Regular  Discussed with bedside RN

## 2022-08-04 ENCOUNTER — READMISSION MANAGEMENT (OUTPATIENT)
Dept: CALL CENTER | Facility: HOSPITAL | Age: 82
End: 2022-08-04

## 2022-08-04 VITALS
OXYGEN SATURATION: 100 % | HEIGHT: 68 IN | BODY MASS INDEX: 32.74 KG/M2 | TEMPERATURE: 97.2 F | HEART RATE: 80 BPM | DIASTOLIC BLOOD PRESSURE: 68 MMHG | RESPIRATION RATE: 18 BRPM | WEIGHT: 216.05 LBS | SYSTOLIC BLOOD PRESSURE: 133 MMHG

## 2022-08-04 PROBLEM — D89.831 CYTOKINE RELEASE SYNDROME, GRADE 1: Status: ACTIVE | Noted: 2022-08-04

## 2022-08-04 LAB
ANION GAP SERPL CALCULATED.3IONS-SCNC: 6.7 MMOL/L (ref 5–15)
BASOPHILS # BLD AUTO: 0.03 10*3/MM3 (ref 0–0.2)
BASOPHILS NFR BLD AUTO: 0.4 % (ref 0–1.5)
BUN SERPL-MCNC: 22 MG/DL (ref 8–23)
BUN/CREAT SERPL: 22 (ref 7–25)
CALCIUM SPEC-SCNC: 9.8 MG/DL (ref 8.6–10.5)
CHLORIDE SERPL-SCNC: 104 MMOL/L (ref 98–107)
CO2 SERPL-SCNC: 23.3 MMOL/L (ref 22–29)
CREAT SERPL-MCNC: 1 MG/DL (ref 0.76–1.27)
DEPRECATED RDW RBC AUTO: 58.4 FL (ref 37–54)
EGFRCR SERPLBLD CKD-EPI 2021: 76.1 ML/MIN/1.73
EOSINOPHIL # BLD AUTO: 0.13 10*3/MM3 (ref 0–0.4)
EOSINOPHIL NFR BLD AUTO: 1.8 % (ref 0.3–6.2)
ERYTHROCYTE [DISTWIDTH] IN BLOOD BY AUTOMATED COUNT: 15.9 % (ref 12.3–15.4)
GLUCOSE BLDC GLUCOMTR-MCNC: 170 MG/DL (ref 70–99)
GLUCOSE BLDC GLUCOMTR-MCNC: 224 MG/DL (ref 70–99)
GLUCOSE SERPL-MCNC: 236 MG/DL (ref 65–99)
HCT VFR BLD AUTO: 34 % (ref 37.5–51)
HGB BLD-MCNC: 12.1 G/DL (ref 13–17.7)
IMM GRANULOCYTES # BLD AUTO: 0.05 10*3/MM3 (ref 0–0.05)
IMM GRANULOCYTES NFR BLD AUTO: 0.7 % (ref 0–0.5)
LYMPHOCYTES # BLD AUTO: 1.47 10*3/MM3 (ref 0.7–3.1)
LYMPHOCYTES NFR BLD AUTO: 20.7 % (ref 19.6–45.3)
MCH RBC QN AUTO: 36 PG (ref 26.6–33)
MCHC RBC AUTO-ENTMCNC: 35.6 G/DL (ref 31.5–35.7)
MCV RBC AUTO: 101.2 FL (ref 79–97)
MONOCYTES # BLD AUTO: 0.65 10*3/MM3 (ref 0.1–0.9)
MONOCYTES NFR BLD AUTO: 9.2 % (ref 5–12)
NEUTROPHILS NFR BLD AUTO: 4.76 10*3/MM3 (ref 1.7–7)
NEUTROPHILS NFR BLD AUTO: 67.2 % (ref 42.7–76)
NRBC BLD AUTO-RTO: 0 /100 WBC (ref 0–0.2)
PLATELET # BLD AUTO: 123 10*3/MM3 (ref 140–450)
PMV BLD AUTO: 9.7 FL (ref 6–12)
POTASSIUM SERPL-SCNC: 4.3 MMOL/L (ref 3.5–5.2)
RBC # BLD AUTO: 3.36 10*6/MM3 (ref 4.14–5.8)
SODIUM SERPL-SCNC: 134 MMOL/L (ref 136–145)
WBC NRBC COR # BLD: 7.09 10*3/MM3 (ref 3.4–10.8)

## 2022-08-04 PROCEDURE — 80048 BASIC METABOLIC PNL TOTAL CA: CPT | Performed by: INTERNAL MEDICINE

## 2022-08-04 PROCEDURE — 25010000002 ENOXAPARIN PER 10 MG: Performed by: HOSPITALIST

## 2022-08-04 PROCEDURE — 82962 GLUCOSE BLOOD TEST: CPT

## 2022-08-04 PROCEDURE — 63710000001 INSULIN LISPRO (HUMAN) PER 5 UNITS: Performed by: HOSPITALIST

## 2022-08-04 PROCEDURE — 96372 THER/PROPH/DIAG INJ SC/IM: CPT

## 2022-08-04 PROCEDURE — G0378 HOSPITAL OBSERVATION PER HR: HCPCS

## 2022-08-04 PROCEDURE — 99217 PR OBSERVATION CARE DISCHARGE MANAGEMENT: CPT | Performed by: INTERNAL MEDICINE

## 2022-08-04 PROCEDURE — 85025 COMPLETE CBC W/AUTO DIFF WBC: CPT | Performed by: HOSPITALIST

## 2022-08-04 RX ORDER — GRANULES FOR ORAL 3 G/1
3 POWDER ORAL
Qty: 6 G | Refills: 0 | Status: SHIPPED | OUTPATIENT
Start: 2022-08-04 | End: 2022-08-10

## 2022-08-04 RX ADMIN — Medication 10 ML: at 09:33

## 2022-08-04 RX ADMIN — INSULIN LISPRO 2 UNITS: 100 INJECTION, SOLUTION INTRAVENOUS; SUBCUTANEOUS at 12:46

## 2022-08-04 RX ADMIN — ENOXAPARIN SODIUM 40 MG: 100 INJECTION SUBCUTANEOUS at 00:10

## 2022-08-04 RX ADMIN — LISINOPRIL 5 MG: 5 TABLET ORAL at 09:32

## 2022-08-04 RX ADMIN — ATORVASTATIN CALCIUM 10 MG: 10 TABLET, FILM COATED ORAL at 09:32

## 2022-08-04 RX ADMIN — MELOXICAM 7.5 MG: 7.5 TABLET ORAL at 09:32

## 2022-08-04 RX ADMIN — INSULIN LISPRO 4 UNITS: 100 INJECTION, SOLUTION INTRAVENOUS; SUBCUTANEOUS at 09:32

## 2022-08-04 RX ADMIN — SENNOSIDES AND DOCUSATE SODIUM 2 TABLET: 50; 8.6 TABLET ORAL at 09:32

## 2022-08-04 RX ADMIN — FINASTERIDE 5 MG: 5 TABLET, FILM COATED ORAL at 09:32

## 2022-08-04 NOTE — PLAN OF CARE
Goal Outcome Evaluation:               Patient pleasant throughout shift. Family at bedside. Got up to use urinal in bathroom with assistance and walker. VSS. No other issues reported. Will continue to monitor.

## 2022-08-04 NOTE — DISCHARGE SUMMARY
[facility]         HOSPITALIST  DISCHARGE SUMMARY    Patient Name: Teto Castle  : 1940  MRN: [id number]    Date of Admission: 2022  Date of Discharge:  22  Primary Care Physician: Cam Dickey MD    Consultants:  None    Discharge Diagnosis:  Acute metabolic encephalopathy due to UTI  COVID-19 infection  Acute ESBL E. coli UTI  Elevated lactic acid  Hyperlipidemia  BPH  Diabetes  Hypertension  Depression      Hospital Course     Hospital Course:  81-year-old male with dementia and frequent confusion hallucinations was brought into the emergency department for concern of UTI.    Recent diagnosis of COVID-19, on Keflex for chronic UTI suppression, recent 5-day treatment of Bactrim for UTI as an outpatient.  In the emergency room and found to have a UTI, ultimately resulted positive for ESBL E. coli.  Patient's mental status improved and he is appropriate to be discharged home today.  He received 1 dose of fosfomycin in the hospital, discussed with infectious disease and given this resistant bacteria and his age fosfomycin is the safest choice for treatment.  Macrobid is an option however given his age it increases his risk for complications and mental status changes.  He is being discharged today in the care of his family, repeat fosfomycin dosing prescribed for  and  to complete his course.  Continue intermittent cathing.  Recommend follow-up with urology.    DISCHARGE Follow Up Recommendations:   Fosfomycin dose on  and   Recommend follow-up with urology  Given that this organism is resistant to Keflex, Bactrim and in his age group Macrobid has significant side effects patient currently does not have a good option for antimicrobial suppression.  Recommend urology follow-up.      Day of Discharge     Vital Signs:  Temp:  [97.6 °F (36.4 °C)-98.6 °F (37 °C)] 97.6 °F (36.4 °C)  Heart Rate:  [75-85] 76  Resp:  [16-20] 18  BP: (112-129)/(51-76)  129/75  Physical Exam:   Gen: NAD, WDWN  Resp: no dyspnea  CV: no LE edema  GI: Abdomen soft +bs  Psych: AOx3, normal mood and affect    Discharge Details        Discharge Medications      New Medications      Instructions Start Date   fosfomycin 3 g pack  Commonly known as: MONUROL   3 g, Oral, Every 3 Days, First dose 8/6 then 8/9         Continue These Medications      Instructions Start Date   benzonatate 100 MG capsule  Commonly known as: TESSALON   100 mg, Oral, 3 Times Daily PRN      Cariprazine HCl 1.5 MG capsule capsule  Commonly known as: VRAYLAR   1.5 mg, Oral, Every Evening      finasteride 5 MG tablet  Commonly known as: PROSCAR   5 mg, Oral, Daily      lisinopril 5 MG tablet  Commonly known as: PRINIVIL,ZESTRIL   5 mg, Oral, Daily      melatonin 5 MG tablet tablet   5 mg, Oral, Nightly      meloxicam 7.5 MG tablet  Commonly known as: MOBIC   7.5 mg, Oral, Daily      metFORMIN 500 MG tablet  Commonly known as: GLUCOPHAGE   500 mg, Oral, 2 Times Daily With Meals      pioglitazone 30 MG tablet  Commonly known as: ACTOS   30 mg, Oral, Daily      TRESIBA FLEXTOUCH SC   12-15 Units, Subcutaneous, Daily      vitamin B-12 500 MCG tablet  Commonly known as: CYANOCOBALAMIN   500 mcg, Oral, Daily         Stop These Medications    cephalexin 250 MG capsule  Commonly known as: KEFLEX     PAXLOVID PO     sulfamethoxazole-trimethoprim 800-160 MG per tablet  Commonly known as: BACTRIM DS,SEPTRA DS            Discharge Disposition:   Home or Self Care    Discharge Condition: Stable    Diet:  Hospital:  Diet Order   Procedures   • Diet Regular       Discharge Activity: As tolerated      No future appointments.    Additional Instructions for the Follow-ups that You Need to Schedule     Discharge Follow-up with PCP   As directed       Currently Documented PCP:    Cam Dickey MD    PCP Phone Number:    [phone]     Follow Up Details: 1 week               Pertinent  and/or Most Recent Results     PROCEDURES:    None    LAB and IMAGING RESULTS:      Lab 08/04/22  0623 08/03/22  0754 08/02/22  1141 08/02/22  0557 08/02/22  0245 08/01/22  2245 08/01/22  1959 08/01/22  1607   WBC 7.09 6.90  --  8.01  --   --   --  7.84   HEMOGLOBIN 12.1* 13.6  --  13.4  --   --   --  14.0   HEMATOCRIT 34.0* 38.0  --  38.0  --   --   --  38.5   PLATELETS 123* 106*  --  127*  --   --   --  177   NEUTROS ABS 4.76 4.57  --  4.85  --   --   --  4.46   IMMATURE GRANS (ABS) 0.05 0.08*  --  0.07*  --   --   --  0.07*   LYMPHS ABS 1.47 1.63  --  2.35  --   --   --  2.52   MONOS ABS 0.65 0.49  --  0.65  --   --   --  0.66   EOS ABS 0.13 0.10  --  0.06  --   --   --  0.09   .2* 101.3*  --  100.8*  --   --   --  100.0*   LACTATE  --  2.0 3.1* 2.7* 3.1* 3.0*   < >  --     < > = values in this interval not displayed.         Lab 08/04/22  0623 08/03/22 0754 08/02/22  0557 08/01/22  1607   SODIUM 134* 135* 132* 131*   POTASSIUM 4.3 4.4 4.3 4.3   CHLORIDE 104 105 102 99   CO2 23.3 22.6 20.1* 20.0*   ANION GAP 6.7 7.4 9.9 12.0   BUN 22 16 16 19   CREATININE 1.00 0.96 1.05 1.15   EGFR 76.1 79.9 71.8 64.3   GLUCOSE 236* 148* 140* 135*   CALCIUM 9.8 9.9 9.6 9.8   MAGNESIUM  --   --  2.0 2.1   PHOSPHORUS  --   --  2.7  --          Lab 08/03/22  0754 08/01/22  1607   TOTAL PROTEIN 6.6 7.0   ALBUMIN 3.60 3.70   GLOBULIN 3.0 3.3   ALT (SGPT) 46* 30   AST (SGOT) 68* 62*   BILIRUBIN 1.8* 2.8*   ALK PHOS 94 99           Brief Urine Lab Results  (Last result in the past 365 days)      Color   Clarity   Blood   Leuk Est   Nitrite   Protein   CREAT   Urine HCG        08/01/22 1916 Yellow   Cloudy   Small (1+)   Moderate (2+)   Negative   Negative               Microbiology Results (last 10 days)     Procedure Component Value - Date/Time    COVID-19,APTIMA PANTHER(ZEE), PAVAN/ ADAM, NP/OP SWAB IN UTM/VTM/SALINE TRANSPORT MEDIA,24 HR TAT - Swab, Nasopharynx [[id number]]  (Abnormal) Collected: 08/02/22 0213    Lab Status: Final result Specimen: Swab from Nasopharynx  Updated: 08/02/22 0711     COVID19 Detected    Narrative:      Fact sheet for providers: https://www.fda.gov/media/172028/download     Fact sheet for patients: https://www.fda.gov/media/285673/download    Test performed by RT PCR.    Blood Culture - Blood, Arm, Right [[id number]]  (Normal) Collected: 08/01/22 1959    Lab Status: Preliminary result Specimen: Blood from Arm, Right Updated: 08/03/22 2015     Blood Culture No growth at 2 days    Blood Culture - Blood, Arm, Right [[id number]]  (Normal) Collected: 08/01/22 1959    Lab Status: Preliminary result Specimen: Blood from Arm, Right Updated: 08/03/22 2016     Blood Culture No growth at 2 days    Urine Culture - Urine, Urine, Random Void [[id number]]  (Abnormal)  (Susceptibility) Collected: 08/01/22 1916    Lab Status: Final result Specimen: Urine, Random Void Updated: 08/03/22 1105     Urine Culture >100,000 CFU/mL Escherichia coli ESBL     Comment: Consider infectious disease consult.  Susceptibility results may not correlate to clinical outcomes.       Narrative:      Colonization of the urinary tract without infection is common. Treatment is discouraged unless the patient is symptomatic, pregnant, or undergoing an invasive urologic procedure.    Susceptibility      Escherichia coli ESBL      ALESHA      Ertapenem Susceptible      Gentamicin Susceptible      Levofloxacin Intermediate      Meropenem Susceptible      Nitrofurantoin Susceptible      Piperacillin + Tazobactam Susceptible      Trimethoprim + Sulfamethoxazole Resistant                                XR Chest 1 View    Result Date: 8/1/2022  Impression:   1. No acute disease or change.       JOSE DANIEL MANDUJANO MD       Electronically Signed and Approved By: JOSE DANIEL MANDUJANO MD on 8/01/2022 at 17:08             Time spent on Discharge including face to face service: Greater than 35 minutes    Electronically signed by Faby Sterling MD, 08/04/22, 2:39 PM EDT.

## 2022-08-04 NOTE — SIGNIFICANT NOTE
08/04/22 1424   Plan   Final Discharge Disposition Code 01 - home or self-care   Final Note Antibiotic Monurol 3 gm packets is $149 even after completing PA, family is aware and ok with paying. Pharmacy only has one pack in stock and is going to order other pack then overnight for dose on 8/9. MD made aware. Patient is going home with daughterLucia at [address] [place] [address]. Denies having any other needs.

## 2022-08-04 NOTE — OUTREACH NOTE
Prep Survey    Flowsheet Row Responses   [facility] facility patient discharged from? New   Is LACE score < 7 ? Yes   Emergency Room discharge w/ pulse ox? No   Eligibility Readm Mgmt   Discharge diagnosis Acute metabolic encephalopathy due to UTI, COVID-19 infection   Does the patient have one of the following disease processes/diagnoses(primary or secondary)? COVID-19   Does the patient have Home health ordered? No   Is there a DME ordered? No   Prep survey completed? Yes          ROSHAN MARKHAM - Registered Nurse

## 2022-08-05 ENCOUNTER — READMISSION MANAGEMENT (OUTPATIENT)
Dept: CALL CENTER | Facility: HOSPITAL | Age: 82
End: 2022-08-05

## 2022-08-05 NOTE — OUTREACH NOTE
COVID-19 Week 1 Survey    Flowsheet Row Responses   [facility] facility patient discharged from? New   Does the patient have one of the following disease processes/diagnoses(primary or secondary)? COVID-19   COVID-19 underlying condition? None   Call Number Call 1   Week 1 Call successful? No  [All numbers attempted-no answer]   Discharge diagnosis Acute metabolic encephalopathy due to UTI, COVID-19 infection          INOCENTE H - Registered Nurse

## 2022-08-06 LAB
BACTERIA SPEC AEROBE CULT: NORMAL
BACTERIA SPEC AEROBE CULT: NORMAL

## 2022-08-07 ENCOUNTER — READMISSION MANAGEMENT (OUTPATIENT)
Dept: CALL CENTER | Facility: HOSPITAL | Age: 82
End: 2022-08-07

## 2022-08-07 NOTE — OUTREACH NOTE
COVID-19 Week 1 Survey    Flowsheet Row Responses   [facility] facility patient discharged from? New   Does the patient have one of the following disease processes/diagnoses(primary or secondary)? COVID-19   COVID-19 underlying condition? None   Call Number Call 2   Week 1 Call successful? No          RENÉ ANGEL - Registered Nurse

## 2022-08-08 ENCOUNTER — NURSE TRIAGE (OUTPATIENT)
Dept: CALL CENTER | Facility: HOSPITAL | Age: 82
End: 2022-08-08

## 2022-08-08 NOTE — TELEPHONE ENCOUNTER
He was discharged from [facility]- on 08/04/22- with Altered mental status and Cytokine. She is calling  Regarding the ( See Below) -   Fosfomycin Tromethamine 3 g Oral Every 3 Days, First dose 8/6 then 8/9 ( He has already completed the dosage. It was not given as directed. He has been given the entire the dosage accidentally. She states he is feeling much better.  He was to follow MD.Greatly encouraged that needs to see PCP. Explained that he took the entire script of  Monurol,He  need to follow up this week. He needs labs and a UA. She will call the PCP now.        Reason for Disposition  • [1] DOUBLE DOSE (an extra dose or lesser amount) of prescription drug AND [2] NO symptoms (Exception: a double dose of antibiotics)    Additional Information  • Negative: [1] Intentional drug overdose AND [2] suicidal thoughts or ideas  • Negative: Drug overdose and triager unable to answer question  • Negative: Caller requesting information unrelated to medicine  • Negative: Caller requesting information about COVID-19 Vaccine  • Negative: Caller requesting information about Emergency Contraception  • Negative: Caller requesting information about Combined Birth Control Pills  • Negative: Caller requesting information about Progestin Birth Control Pills  • Negative: Caller requesting information about Post-Op pain or medicines  • Negative: Caller requesting a prescription antibiotic (such as Penicillin) for Strep throat and has a positive culture result  • Negative: Caller requesting a prescription anti-viral med (such as Tamiflu) and has influenza (flu)  symptoms  • Negative: Immunization reaction suspected  • Negative: Rash while taking a medicine or within 3 days of stopping it  • Negative: [1] Asthma and [2] having symptoms of asthma (cough, wheezing, etc.)  • Negative: [1] Symptom of illness (e.g., headache, abdominal pain, earache, vomiting) AND [2] more than mild  • Negative: Breastfeeding questions about mother's  "medicines and diet  • Negative: MORE THAN A DOUBLE DOSE of a prescription or over-the-counter (OTC) drug  • Negative: [1] DOUBLE DOSE (an extra dose or lesser amount) of prescription drug AND [2] any symptoms (e.g., dizziness, nausea, pain, sleepiness)  • Negative: [1] DOUBLE DOSE (an extra dose or lesser amount) of over-the-counter (OTC) drug AND [2] any symptoms (e.g., dizziness, nausea, pain, sleepiness)  • Negative: Took another person's prescription drug    Answer Assessment - Initial Assessment Questions  1. NAME of MEDICATION: \"What medicine are you calling about?\"      Monurol  2. QUESTION: \"What is your question?\" (e.g., medication refill, side effect)      He has already been given all of the medication and it is wrong  3. PRESCRIBING HCP: \"Who prescribed it?\" Reason: if prescribed by specialist, call should be referred to that group.     Monurol   4. SYMPTOMS: \"Do you have any symptoms?\"      none  5. SEVERITY: If symptoms are present, ask \"Are they mild, moderate or severe?\"      n  6. PREGNANCY:  \"Is there any chance that you are pregnant?\" \"When was your last menstrual period?\"      n    Protocols used: MEDICATION QUESTION CALL-ADULT-      "

## 2022-08-12 LAB — QT INTERVAL: 429 MS

## 2022-08-18 ENCOUNTER — TRANSCRIBE ORDERS (OUTPATIENT)
Dept: ADMINISTRATIVE | Facility: HOSPITAL | Age: 82
End: 2022-08-18

## 2022-08-18 DIAGNOSIS — R31.9 URINARY TRACT INFECTION WITH HEMATURIA, SITE UNSPECIFIED: Primary | ICD-10-CM

## 2022-08-18 DIAGNOSIS — N39.0 URINARY TRACT INFECTION WITH HEMATURIA, SITE UNSPECIFIED: Primary | ICD-10-CM

## 2022-08-18 DIAGNOSIS — N40.1 BENIGN LOCALIZED HYPERPLASIA OF PROSTATE WITH URINARY RETENTION: ICD-10-CM

## 2022-08-18 DIAGNOSIS — R33.8 BENIGN LOCALIZED HYPERPLASIA OF PROSTATE WITH URINARY RETENTION: ICD-10-CM

## 2022-08-22 ENCOUNTER — HOSPITAL ENCOUNTER (OUTPATIENT)
Dept: CT IMAGING | Facility: HOSPITAL | Age: 82
Discharge: HOME | End: 2022-08-22
Admitting: UROLOGY

## 2022-08-22 DIAGNOSIS — R33.8 BENIGN LOCALIZED HYPERPLASIA OF PROSTATE WITH URINARY RETENTION: ICD-10-CM

## 2022-08-22 DIAGNOSIS — N40.1 BENIGN LOCALIZED HYPERPLASIA OF PROSTATE WITH URINARY RETENTION: ICD-10-CM

## 2022-08-22 DIAGNOSIS — R31.9 URINARY TRACT INFECTION WITH HEMATURIA, SITE UNSPECIFIED: ICD-10-CM

## 2022-08-22 DIAGNOSIS — N39.0 URINARY TRACT INFECTION WITH HEMATURIA, SITE UNSPECIFIED: ICD-10-CM

## 2022-08-22 PROCEDURE — 74176 CT ABD & PELVIS W/O CONTRAST: CPT

## 2022-09-21 ENCOUNTER — PRE-ADMISSION TESTING (OUTPATIENT)
Dept: PREADMISSION TESTING | Facility: HOSPITAL | Age: 82
End: 2022-09-21

## 2022-09-21 VITALS
SYSTOLIC BLOOD PRESSURE: 116 MMHG | HEART RATE: 69 BPM | WEIGHT: 221 LBS | OXYGEN SATURATION: 100 % | BODY MASS INDEX: 32.73 KG/M2 | DIASTOLIC BLOOD PRESSURE: 69 MMHG | RESPIRATION RATE: 16 BRPM | TEMPERATURE: 97 F | HEIGHT: 69 IN

## 2022-09-21 LAB
ALBUMIN SERPL-MCNC: 3.8 G/DL (ref 3.5–5.2)
ALBUMIN/GLOB SERPL: 1.5 G/DL
ALP SERPL-CCNC: 104 U/L (ref 39–117)
ALT SERPL W P-5'-P-CCNC: 26 U/L (ref 1–41)
ANION GAP SERPL CALCULATED.3IONS-SCNC: 11.5 MMOL/L (ref 5–15)
AST SERPL-CCNC: 56 U/L (ref 1–40)
BILIRUB SERPL-MCNC: 3.3 MG/DL (ref 0–1.2)
BUN SERPL-MCNC: 10 MG/DL (ref 8–23)
BUN/CREAT SERPL: 9.4 (ref 7–25)
CALCIUM SPEC-SCNC: 9.3 MG/DL (ref 8.6–10.5)
CHLORIDE SERPL-SCNC: 102 MMOL/L (ref 98–107)
CO2 SERPL-SCNC: 23.5 MMOL/L (ref 22–29)
CREAT SERPL-MCNC: 1.06 MG/DL (ref 0.76–1.27)
DEPRECATED RDW RBC AUTO: 50.2 FL (ref 37–54)
EGFRCR SERPLBLD CKD-EPI 2021: 70.5 ML/MIN/1.73
ERYTHROCYTE [DISTWIDTH] IN BLOOD BY AUTOMATED COUNT: 13.5 % (ref 12.3–15.4)
GLOBULIN UR ELPH-MCNC: 2.5 GM/DL
GLUCOSE SERPL-MCNC: 155 MG/DL (ref 65–99)
HCT VFR BLD AUTO: 35.4 % (ref 37.5–51)
HGB BLD-MCNC: 12.5 G/DL (ref 13–17.7)
MCH RBC QN AUTO: 35.6 PG (ref 26.6–33)
MCHC RBC AUTO-ENTMCNC: 35.3 G/DL (ref 31.5–35.7)
MCV RBC AUTO: 100.9 FL (ref 79–97)
PLATELET # BLD AUTO: 105 10*3/MM3 (ref 140–450)
PMV BLD AUTO: 9.8 FL (ref 6–12)
POTASSIUM SERPL-SCNC: 4.2 MMOL/L (ref 3.5–5.2)
PROT SERPL-MCNC: 6.3 G/DL (ref 6–8.5)
RBC # BLD AUTO: 3.51 10*6/MM3 (ref 4.14–5.8)
SODIUM SERPL-SCNC: 137 MMOL/L (ref 136–145)
WBC NRBC COR # BLD: 4.76 10*3/MM3 (ref 3.4–10.8)

## 2022-09-21 PROCEDURE — 80053 COMPREHEN METABOLIC PANEL: CPT

## 2022-09-21 PROCEDURE — 85027 COMPLETE CBC AUTOMATED: CPT

## 2022-09-21 PROCEDURE — 36415 COLL VENOUS BLD VENIPUNCTURE: CPT

## 2022-09-21 RX ORDER — CEPHALEXIN 250 MG/1
250 CAPSULE ORAL DAILY
Status: ON HOLD | COMMUNITY
Start: 2022-09-18 | End: 2022-09-30

## 2022-09-21 NOTE — DISCHARGE INSTRUCTIONS
Take the following medications the morning of surgery with a small sip of water:    NO MEDS AM OF SURGERY      TIME OF ARRIVAL 1:00    If you are on prescription narcotic pain medication to control your pain you may also take that medication the morning of surgery.    General Instructions:  Do not eat or drink anything after 7:00 AM  DAY OF SURGERY AS INSTRUCTED BY YOU DOCTOR  Infants may have breast milk up to four hours before surgery.  Infants drinking formula may drink formula up to six hours before surgery.   Patients who avoid smoking, chewing tobacco and alcohol for 4 weeks prior to surgery have a reduced risk of post-operative complications.  Quit smoking as many days before surgery as you can.  Do not smoke, use chewing tobacco or drink alcohol the day of surgery.   If applicable bring your C-PAP/ BI-PAP machine.  Bring any papers given to you in the doctor’s office.  Wear clean comfortable clothes.  Do not wear contact lenses, false eyelashes or make-up.  Bring a case for your glasses.   Bring crutches or walker if applicable.  Remove all piercings.  Leave jewelry and any other valuables at home.  Hair extensions with metal clips must be removed prior to surgery.  The Pre-Admission Testing nurse will instruct you to bring medications if unable to obtain an accurate list in Pre-Admission Testing.        Preventing a Surgical Site Infection:  For 2 to 3 days before surgery, avoid shaving with a razor because the razor can irritate skin and make it easier to develop an infection.    Any areas of open skin can increase the risk of a post-operative wound infection by allowing bacteria to enter and travel throughout the body.  Notify your surgeon if you have any skin wounds / rashes even if it is not near the expected surgical site.  The area will need assessed to determine if surgery should be delayed until it is healed.  The night prior to surgery shower using a fresh bar of anti-bacterial soap (such as Dial)  and clean washcloth.  Sleep in a clean bed with clean clothing.  Do not allow pets to sleep with you.  Shower on the morning of surgery using a fresh bar of anti-bacterial soap (such as Dial) and clean washcloth.  Dry with a clean towel and dress in clean clothing.  Ask your surgeon if you will be receiving antibiotics prior to surgery.  Make sure you, your family, and all healthcare providers clean their hands with soap and water or an alcohol based hand  before caring for you or your wound.    Day of surgery:  Your arrival time is approximately two hours before your scheduled surgery time.  Upon arrival, a Pre-op nurse and Anesthesiologist will review your health history, obtain vital signs, and answer questions you may have.  The only belongings needed at this time will be your home medications and if applicable your C-PAP/BI-PAP machine.  A Pre-op nurse will start an IV and you may receive medication in preparation for surgery, including something to help you relax.      Please be aware that surgery does come with discomfort.  We want to make every effort to control your discomfort so please discuss any uncontrolled symptoms with your nurse.   Your doctor will most likely have prescribed pain medications.      If you are going home after surgery you will receive individualized written care instructions before being discharged.  A responsible adult must drive you to and from the hospital on the day of your surgery and stay with you for 24 hours.  Discharge prescriptions can be filled by the hospital pharmacy during regular pharmacy hours.  If you are having surgery late in the day/evening your prescription may be e-prescribed to your pharmacy.  Please verify your pharmacy hours or chose a 24 hour pharmacy to avoid not having access to your prescription because your pharmacy has closed for the day.    If you are staying overnight following surgery, you will be transported to your hospital room following  the recovery period.  [facility] has all private rooms.    If you have any questions please call Pre-Admission Testing at [phone].  Deductibles and co-payments are collected on the day of service. Please be prepared to pay the required co-pay, deductible or deposit on the day of service as defined by your plan.    Call your surgeon immediately if you experience any of the following symptoms:  Sore Throat  Shortness of Breath or difficulty breathing  Cough  Chills  Body soreness or muscle pain  Headache  Fever  New loss of taste or smell  Do not arrive for your surgery ill.  Your procedure will need to be rescheduled to another time.  You will need to call your physician before the day of surgery to avoid any unnecessary exposure to hospital staff as well as other patients.

## 2022-09-30 ENCOUNTER — HOSPITAL ENCOUNTER (INPATIENT)
Facility: HOSPITAL | Age: 82
LOS: 7 days | Discharge: HOME | End: 2022-10-07
Attending: UROLOGY | Admitting: UROLOGY

## 2022-09-30 ENCOUNTER — ANESTHESIA (OUTPATIENT)
Dept: PERIOP | Facility: HOSPITAL | Age: 82
End: 2022-09-30

## 2022-09-30 ENCOUNTER — ANESTHESIA EVENT (OUTPATIENT)
Dept: PERIOP | Facility: HOSPITAL | Age: 82
End: 2022-09-30

## 2022-09-30 DIAGNOSIS — N32.3 BLADDER DIVERTICULUM: ICD-10-CM

## 2022-09-30 DIAGNOSIS — N32.3 ACQUIRED BLADDER DIVERTICULUM: Primary | ICD-10-CM

## 2022-09-30 PROBLEM — Z41.9 SURGERY, ELECTIVE: Status: ACTIVE | Noted: 2022-09-30

## 2022-09-30 LAB
GLUCOSE BLDC GLUCOMTR-MCNC: 117 MG/DL (ref 70–130)
GLUCOSE BLDC GLUCOMTR-MCNC: 99 MG/DL (ref 70–130)

## 2022-09-30 PROCEDURE — 25010000002 HYDROMORPHONE PER 4 MG: Performed by: UROLOGY

## 2022-09-30 PROCEDURE — 25010000002 PROPOFOL 200 MG/20ML EMULSION: Performed by: ANESTHESIOLOGY

## 2022-09-30 PROCEDURE — 25010000002 CEFAZOLIN IN DEXTROSE 2-4 GM/100ML-% SOLUTION: Performed by: UROLOGY

## 2022-09-30 PROCEDURE — 82962 GLUCOSE BLOOD TEST: CPT

## 2022-09-30 PROCEDURE — 25010000002 DEXAMETHASONE SODIUM PHOSPHATE 20 MG/5ML SOLUTION: Performed by: ANESTHESIOLOGY

## 2022-09-30 PROCEDURE — 0TBB4ZZ EXCISION OF BLADDER, PERCUTANEOUS ENDOSCOPIC APPROACH: ICD-10-PCS | Performed by: UROLOGY

## 2022-09-30 PROCEDURE — C1758 CATHETER, URETERAL: HCPCS | Performed by: UROLOGY

## 2022-09-30 PROCEDURE — C1769 GUIDE WIRE: HCPCS | Performed by: UROLOGY

## 2022-09-30 PROCEDURE — 0T9B30Z DRAINAGE OF BLADDER WITH DRAINAGE DEVICE, PERCUTANEOUS APPROACH: ICD-10-PCS | Performed by: UROLOGY

## 2022-09-30 PROCEDURE — 88305 TISSUE EXAM BY PATHOLOGIST: CPT | Performed by: UROLOGY

## 2022-09-30 PROCEDURE — 25010000002 HYDROMORPHONE 1 MG/ML SOLUTION: Performed by: ANESTHESIOLOGY

## 2022-09-30 PROCEDURE — 25010000002 PHENYLEPHRINE 10 MG/ML SOLUTION: Performed by: ANESTHESIOLOGY

## 2022-09-30 PROCEDURE — 25010000002 FENTANYL CITRATE (PF) 100 MCG/2ML SOLUTION: Performed by: ANESTHESIOLOGY

## 2022-09-30 PROCEDURE — 25010000002 ONDANSETRON PER 1 MG: Performed by: ANESTHESIOLOGY

## 2022-09-30 RX ORDER — SODIUM CHLORIDE 0.9 % (FLUSH) 0.9 %
3 SYRINGE (ML) INJECTION EVERY 12 HOURS SCHEDULED
Status: DISCONTINUED | OUTPATIENT
Start: 2022-09-30 | End: 2022-09-30 | Stop reason: HOSPADM

## 2022-09-30 RX ORDER — ROCURONIUM BROMIDE 10 MG/ML
INJECTION, SOLUTION INTRAVENOUS AS NEEDED
Status: DISCONTINUED | OUTPATIENT
Start: 2022-09-30 | End: 2022-09-30 | Stop reason: SURG

## 2022-09-30 RX ORDER — ONDANSETRON 2 MG/ML
4 INJECTION INTRAMUSCULAR; INTRAVENOUS EVERY 6 HOURS PRN
Status: DISCONTINUED | OUTPATIENT
Start: 2022-09-30 | End: 2022-10-07 | Stop reason: HOSPADM

## 2022-09-30 RX ORDER — HYDRALAZINE HYDROCHLORIDE 20 MG/ML
5 INJECTION INTRAMUSCULAR; INTRAVENOUS
Status: DISCONTINUED | OUTPATIENT
Start: 2022-09-30 | End: 2022-09-30 | Stop reason: HOSPADM

## 2022-09-30 RX ORDER — PROMETHAZINE HYDROCHLORIDE 25 MG/1
25 TABLET ORAL ONCE AS NEEDED
Status: DISCONTINUED | OUTPATIENT
Start: 2022-09-30 | End: 2022-09-30 | Stop reason: HOSPADM

## 2022-09-30 RX ORDER — MIDAZOLAM HYDROCHLORIDE 1 MG/ML
0.5 INJECTION INTRAMUSCULAR; INTRAVENOUS
Status: DISCONTINUED | OUTPATIENT
Start: 2022-09-30 | End: 2022-09-30 | Stop reason: HOSPADM

## 2022-09-30 RX ORDER — FENTANYL CITRATE 50 UG/ML
50 INJECTION, SOLUTION INTRAMUSCULAR; INTRAVENOUS
Status: DISCONTINUED | OUTPATIENT
Start: 2022-09-30 | End: 2022-09-30 | Stop reason: HOSPADM

## 2022-09-30 RX ORDER — SODIUM CHLORIDE 9 MG/ML
100 INJECTION, SOLUTION INTRAVENOUS CONTINUOUS
Status: DISCONTINUED | OUTPATIENT
Start: 2022-09-30 | End: 2022-10-06

## 2022-09-30 RX ORDER — ONDANSETRON 2 MG/ML
4 INJECTION INTRAMUSCULAR; INTRAVENOUS ONCE AS NEEDED
Status: DISCONTINUED | OUTPATIENT
Start: 2022-09-30 | End: 2022-09-30 | Stop reason: HOSPADM

## 2022-09-30 RX ORDER — ONDANSETRON 4 MG/1
4 TABLET, FILM COATED ORAL EVERY 6 HOURS PRN
Status: DISCONTINUED | OUTPATIENT
Start: 2022-09-30 | End: 2022-10-07 | Stop reason: HOSPADM

## 2022-09-30 RX ORDER — DEXAMETHASONE SODIUM PHOSPHATE 4 MG/ML
INJECTION, SOLUTION INTRA-ARTICULAR; INTRALESIONAL; INTRAMUSCULAR; INTRAVENOUS; SOFT TISSUE AS NEEDED
Status: DISCONTINUED | OUTPATIENT
Start: 2022-09-30 | End: 2022-09-30 | Stop reason: SURG

## 2022-09-30 RX ORDER — NITROFURANTOIN 25; 75 MG/1; MG/1
100 CAPSULE ORAL DAILY
COMMUNITY
Start: 2022-09-18 | End: 2022-10-07 | Stop reason: HOSPADM

## 2022-09-30 RX ORDER — ONDANSETRON 2 MG/ML
INJECTION INTRAMUSCULAR; INTRAVENOUS AS NEEDED
Status: DISCONTINUED | OUTPATIENT
Start: 2022-09-30 | End: 2022-09-30 | Stop reason: SURG

## 2022-09-30 RX ORDER — NALOXONE HCL 0.4 MG/ML
0.2 VIAL (ML) INJECTION AS NEEDED
Status: DISCONTINUED | OUTPATIENT
Start: 2022-09-30 | End: 2022-09-30 | Stop reason: HOSPADM

## 2022-09-30 RX ORDER — DIPHENHYDRAMINE HYDROCHLORIDE 50 MG/ML
12.5 INJECTION INTRAMUSCULAR; INTRAVENOUS
Status: DISCONTINUED | OUTPATIENT
Start: 2022-09-30 | End: 2022-09-30 | Stop reason: HOSPADM

## 2022-09-30 RX ORDER — FLUMAZENIL 0.1 MG/ML
0.2 INJECTION INTRAVENOUS AS NEEDED
Status: DISCONTINUED | OUTPATIENT
Start: 2022-09-30 | End: 2022-09-30 | Stop reason: HOSPADM

## 2022-09-30 RX ORDER — FENTANYL CITRATE 50 UG/ML
INJECTION, SOLUTION INTRAMUSCULAR; INTRAVENOUS AS NEEDED
Status: DISCONTINUED | OUTPATIENT
Start: 2022-09-30 | End: 2022-09-30 | Stop reason: SURG

## 2022-09-30 RX ORDER — PROMETHAZINE HYDROCHLORIDE 25 MG/1
25 SUPPOSITORY RECTAL ONCE AS NEEDED
Status: DISCONTINUED | OUTPATIENT
Start: 2022-09-30 | End: 2022-09-30 | Stop reason: HOSPADM

## 2022-09-30 RX ORDER — PROPOFOL 10 MG/ML
INJECTION, EMULSION INTRAVENOUS AS NEEDED
Status: DISCONTINUED | OUTPATIENT
Start: 2022-09-30 | End: 2022-09-30 | Stop reason: SURG

## 2022-09-30 RX ORDER — HYDROMORPHONE HYDROCHLORIDE 1 MG/ML
0.5 INJECTION, SOLUTION INTRAMUSCULAR; INTRAVENOUS; SUBCUTANEOUS
Status: DISCONTINUED | OUTPATIENT
Start: 2022-09-30 | End: 2022-10-07 | Stop reason: HOSPADM

## 2022-09-30 RX ORDER — SODIUM CHLORIDE 0.9 % (FLUSH) 0.9 %
3-10 SYRINGE (ML) INJECTION AS NEEDED
Status: DISCONTINUED | OUTPATIENT
Start: 2022-09-30 | End: 2022-09-30 | Stop reason: HOSPADM

## 2022-09-30 RX ORDER — LIDOCAINE HYDROCHLORIDE 20 MG/ML
INJECTION, SOLUTION EPIDURAL; INFILTRATION; INTRACAUDAL; PERINEURAL AS NEEDED
Status: DISCONTINUED | OUTPATIENT
Start: 2022-09-30 | End: 2022-09-30 | Stop reason: SURG

## 2022-09-30 RX ORDER — PHENYLEPHRINE HYDROCHLORIDE 10 MG/ML
INJECTION INTRAVENOUS AS NEEDED
Status: DISCONTINUED | OUTPATIENT
Start: 2022-09-30 | End: 2022-09-30 | Stop reason: SURG

## 2022-09-30 RX ORDER — HYDROMORPHONE HYDROCHLORIDE 1 MG/ML
0.5 INJECTION, SOLUTION INTRAMUSCULAR; INTRAVENOUS; SUBCUTANEOUS
Status: DISCONTINUED | OUTPATIENT
Start: 2022-09-30 | End: 2022-09-30 | Stop reason: HOSPADM

## 2022-09-30 RX ORDER — FAMOTIDINE 10 MG/ML
20 INJECTION, SOLUTION INTRAVENOUS ONCE
Status: COMPLETED | OUTPATIENT
Start: 2022-09-30 | End: 2022-09-30

## 2022-09-30 RX ORDER — LIDOCAINE HYDROCHLORIDE 10 MG/ML
0.5 INJECTION, SOLUTION EPIDURAL; INFILTRATION; INTRACAUDAL; PERINEURAL ONCE AS NEEDED
Status: DISCONTINUED | OUTPATIENT
Start: 2022-09-30 | End: 2022-09-30 | Stop reason: HOSPADM

## 2022-09-30 RX ORDER — MAGNESIUM HYDROXIDE 1200 MG/15ML
LIQUID ORAL AS NEEDED
Status: DISCONTINUED | OUTPATIENT
Start: 2022-09-30 | End: 2022-09-30 | Stop reason: HOSPADM

## 2022-09-30 RX ORDER — OXYCODONE AND ACETAMINOPHEN 7.5; 325 MG/1; MG/1
1 TABLET ORAL EVERY 4 HOURS PRN
Status: DISCONTINUED | OUTPATIENT
Start: 2022-09-30 | End: 2022-09-30 | Stop reason: HOSPADM

## 2022-09-30 RX ORDER — SODIUM CHLORIDE, SODIUM LACTATE, POTASSIUM CHLORIDE, CALCIUM CHLORIDE 600; 310; 30; 20 MG/100ML; MG/100ML; MG/100ML; MG/100ML
9 INJECTION, SOLUTION INTRAVENOUS CONTINUOUS
Status: DISCONTINUED | OUTPATIENT
Start: 2022-09-30 | End: 2022-09-30

## 2022-09-30 RX ORDER — HYDROCODONE BITARTRATE AND ACETAMINOPHEN 7.5; 325 MG/1; MG/1
1 TABLET ORAL ONCE AS NEEDED
Status: COMPLETED | OUTPATIENT
Start: 2022-09-30 | End: 2022-09-30

## 2022-09-30 RX ORDER — NALOXONE HCL 0.4 MG/ML
0.1 VIAL (ML) INJECTION
Status: DISCONTINUED | OUTPATIENT
Start: 2022-09-30 | End: 2022-10-07 | Stop reason: HOSPADM

## 2022-09-30 RX ORDER — SODIUM CHLORIDE 0.9 % (FLUSH) 0.9 %
10 SYRINGE (ML) INJECTION AS NEEDED
Status: DISCONTINUED | OUTPATIENT
Start: 2022-09-30 | End: 2022-10-07 | Stop reason: HOSPADM

## 2022-09-30 RX ORDER — CEFAZOLIN SODIUM 2 G/100ML
2 INJECTION, SOLUTION INTRAVENOUS ONCE
Status: COMPLETED | OUTPATIENT
Start: 2022-09-30 | End: 2022-09-30

## 2022-09-30 RX ORDER — INDOCYANINE GREEN AND WATER 25 MG
25 KIT INJECTION ONCE
Status: COMPLETED | OUTPATIENT
Start: 2022-09-30 | End: 2022-09-30

## 2022-09-30 RX ORDER — LABETALOL HYDROCHLORIDE 5 MG/ML
5 INJECTION, SOLUTION INTRAVENOUS
Status: DISCONTINUED | OUTPATIENT
Start: 2022-09-30 | End: 2022-09-30 | Stop reason: HOSPADM

## 2022-09-30 RX ORDER — SODIUM CHLORIDE 0.9 % (FLUSH) 0.9 %
3 SYRINGE (ML) INJECTION EVERY 12 HOURS SCHEDULED
Status: DISCONTINUED | OUTPATIENT
Start: 2022-09-30 | End: 2022-10-07 | Stop reason: HOSPADM

## 2022-09-30 RX ORDER — OXYCODONE AND ACETAMINOPHEN 7.5; 325 MG/1; MG/1
1 TABLET ORAL EVERY 4 HOURS PRN
Status: DISCONTINUED | OUTPATIENT
Start: 2022-09-30 | End: 2022-10-07 | Stop reason: HOSPADM

## 2022-09-30 RX ORDER — EPHEDRINE SULFATE 50 MG/ML
5 INJECTION, SOLUTION INTRAVENOUS ONCE AS NEEDED
Status: DISCONTINUED | OUTPATIENT
Start: 2022-09-30 | End: 2022-09-30 | Stop reason: HOSPADM

## 2022-09-30 RX ORDER — IBUPROFEN 600 MG/1
600 TABLET ORAL ONCE AS NEEDED
Status: DISCONTINUED | OUTPATIENT
Start: 2022-09-30 | End: 2022-09-30 | Stop reason: HOSPADM

## 2022-09-30 RX ORDER — LEVOFLOXACIN 500 MG/1
500 TABLET, FILM COATED ORAL DAILY
Status: ON HOLD | COMMUNITY
Start: 2022-09-13 | End: 2022-09-30

## 2022-09-30 RX ORDER — DIPHENHYDRAMINE HCL 25 MG
25 CAPSULE ORAL
Status: DISCONTINUED | OUTPATIENT
Start: 2022-09-30 | End: 2022-09-30 | Stop reason: HOSPADM

## 2022-09-30 RX ADMIN — PHENYLEPHRINE HYDROCHLORIDE 100 MCG: 10 INJECTION, SOLUTION INTRAVENOUS at 16:42

## 2022-09-30 RX ADMIN — SODIUM CHLORIDE 100 ML/HR: 9 INJECTION, SOLUTION INTRAVENOUS at 23:08

## 2022-09-30 RX ADMIN — Medication 3 ML: at 22:45

## 2022-09-30 RX ADMIN — CEFAZOLIN SODIUM 2 G: 2 INJECTION, SOLUTION INTRAVENOUS at 16:57

## 2022-09-30 RX ADMIN — HYDROCODONE BITARTRATE AND ACETAMINOPHEN 1 TABLET: 7.5; 325 TABLET ORAL at 19:20

## 2022-09-30 RX ADMIN — SODIUM CHLORIDE, POTASSIUM CHLORIDE, SODIUM LACTATE AND CALCIUM CHLORIDE: 600; 310; 30; 20 INJECTION, SOLUTION INTRAVENOUS at 17:42

## 2022-09-30 RX ADMIN — DEXAMETHASONE SODIUM PHOSPHATE 8 MG: 4 INJECTION, SOLUTION INTRAMUSCULAR; INTRAVENOUS at 18:23

## 2022-09-30 RX ADMIN — SODIUM CHLORIDE, POTASSIUM CHLORIDE, SODIUM LACTATE AND CALCIUM CHLORIDE 9 ML/HR: 600; 310; 30; 20 INJECTION, SOLUTION INTRAVENOUS at 15:00

## 2022-09-30 RX ADMIN — PHENYLEPHRINE HYDROCHLORIDE 100 MCG: 10 INJECTION, SOLUTION INTRAVENOUS at 16:51

## 2022-09-30 RX ADMIN — HYDROMORPHONE HYDROCHLORIDE 0.5 MG: 1 INJECTION, SOLUTION INTRAMUSCULAR; INTRAVENOUS; SUBCUTANEOUS at 18:20

## 2022-09-30 RX ADMIN — SUGAMMADEX 200 MG: 100 INJECTION, SOLUTION INTRAVENOUS at 18:23

## 2022-09-30 RX ADMIN — ROCURONIUM BROMIDE 10 MG: 10 INJECTION INTRAVENOUS at 17:15

## 2022-09-30 RX ADMIN — ROCURONIUM BROMIDE 50 MG: 10 INJECTION INTRAVENOUS at 16:42

## 2022-09-30 RX ADMIN — LIDOCAINE HYDROCHLORIDE 100 MG: 20 INJECTION, SOLUTION EPIDURAL; INFILTRATION; INTRACAUDAL; PERINEURAL at 16:42

## 2022-09-30 RX ADMIN — ONDANSETRON 4 MG: 2 INJECTION INTRAMUSCULAR; INTRAVENOUS at 18:23

## 2022-09-30 RX ADMIN — INDOCYANINE GREEN AND WATER 12.5 MG: KIT at 18:00

## 2022-09-30 RX ADMIN — HYDROMORPHONE HYDROCHLORIDE 0.5 MG: 1 INJECTION, SOLUTION INTRAMUSCULAR; INTRAVENOUS; SUBCUTANEOUS at 18:28

## 2022-09-30 RX ADMIN — PHENYLEPHRINE HYDROCHLORIDE 200 MCG: 10 INJECTION, SOLUTION INTRAVENOUS at 17:41

## 2022-09-30 RX ADMIN — FENTANYL CITRATE 50 MCG: 50 INJECTION, SOLUTION INTRAMUSCULAR; INTRAVENOUS at 17:41

## 2022-09-30 RX ADMIN — PROPOFOL 150 MG: 10 INJECTION, EMULSION INTRAVENOUS at 16:42

## 2022-09-30 RX ADMIN — PHENYLEPHRINE HYDROCHLORIDE 100 MCG: 10 INJECTION, SOLUTION INTRAVENOUS at 18:02

## 2022-09-30 RX ADMIN — HYDROMORPHONE HYDROCHLORIDE 0.5 MG: 1 INJECTION, SOLUTION INTRAMUSCULAR; INTRAVENOUS; SUBCUTANEOUS at 23:08

## 2022-09-30 RX ADMIN — FENTANYL CITRATE 50 MCG: 50 INJECTION, SOLUTION INTRAMUSCULAR; INTRAVENOUS at 17:15

## 2022-09-30 RX ADMIN — FAMOTIDINE 20 MG: 10 INJECTION INTRAVENOUS at 15:00

## 2022-09-30 RX ADMIN — CEFAZOLIN SODIUM 2 G: 2 INJECTION, SOLUTION INTRAVENOUS at 16:27

## 2022-09-30 NOTE — ANESTHESIA POSTPROCEDURE EVALUATION
Patient: Teto Castle    Procedure Summary     Date: 09/30/22 Room / Location: [facility] OR  / [facility] MAIN OR    Anesthesia Start: 1635 Anesthesia Stop: 1842    Procedures:       Bladder Diverticulectomy (N/A Pelvis)      SUPRAPUBIC CATHETER INSERTION (N/A ) Diagnosis:       Acquired bladder diverticulum      Recurrent cystitis      (Acquired Bladder Diverticulum)    Surgeons: Art Dickerson MD Provider: Darian Rose MD    Anesthesia Type: general ASA Status: 4          Anesthesia Type: general    Vitals  Vitals Value Taken Time   /54 09/30/22 1936   Temp 36.5 °C (97.7 °F) 09/30/22 1939   Pulse 72 09/30/22 1942   Resp 16 09/30/22 1936   SpO2 100 % 09/30/22 1943   Vitals shown include unvalidated device data.        Post Anesthesia Care and Evaluation    Patient location during evaluation: bedside  Patient participation: complete - patient participated  Level of consciousness: awake and alert  Pain management: adequate    Airway patency: patent  Anesthetic complications: No anesthetic complications  PONV Status: controlled  Cardiovascular status: acceptable and hemodynamically stable  Respiratory status: acceptable, spontaneous ventilation and nonlabored ventilation  Hydration status: acceptable    Comments: /54   Pulse 68   Temp 36.5 °C (97.7 °F) (Oral)   Resp 16   SpO2 100%

## 2022-09-30 NOTE — ANESTHESIA PROCEDURE NOTES
Airway  Urgency: elective    Date/Time: 9/30/2022 4:44 PM  Airway not difficult    General Information and Staff    Patient location during procedure: OR  Anesthesiologist: Darian Rose MD    Indications and Patient Condition  Indications for airway management: airway protection    Preoxygenated: yes  Mask difficulty assessment: 2 - vent by mask + OA or adjuvant +/- NMBA    Final Airway Details  Final airway type: endotracheal airway      Successful airway: ETT  Cuffed: yes   Successful intubation technique: direct laryngoscopy  Endotracheal tube insertion site: oral  Blade: Fransico  Blade size: 4  ETT size (mm): 7.5  Cormack-Lehane Classification: grade I - full view of glottis  Placement verified by: chest auscultation and capnometry   Measured from: lips  ETT/EBT  to lips (cm): 23  Number of attempts at approach: 1  Assessment: lips, teeth, and gum same as pre-op and atraumatic intubation

## 2022-09-30 NOTE — ANESTHESIA PREPROCEDURE EVALUATION
Anesthesia Evaluation                  Airway   Mallampati: I  TM distance: >3 FB  Neck ROM: full  No difficulty expected  Dental    (+) edentulous    Pulmonary - normal exam   (+) a smoker Former,   Cardiovascular - normal exam    (+) hypertension,       Neuro/Psych  (+) syncope, psychiatric history Anxiety and Depression,    GI/Hepatic/Renal/Endo    (+)   liver disease cirrhosis, diabetes mellitus type 2,     Musculoskeletal     Abdominal    Substance History      OB/GYN          Other   arthritis,                      Anesthesia Plan    ASA 4     general     intravenous induction     Anesthetic plan, risks, benefits, and alternatives have been provided, discussed and informed consent has been obtained with: patient.        CODE STATUS:

## 2022-10-01 LAB
ANION GAP SERPL CALCULATED.3IONS-SCNC: 14.2 MMOL/L (ref 5–15)
BUN SERPL-MCNC: 17 MG/DL (ref 8–23)
BUN/CREAT SERPL: 11.1 (ref 7–25)
CALCIUM SPEC-SCNC: 8.6 MG/DL (ref 8.6–10.5)
CHLORIDE SERPL-SCNC: 97 MMOL/L (ref 98–107)
CO2 SERPL-SCNC: 19.8 MMOL/L (ref 22–29)
CREAT SERPL-MCNC: 1.53 MG/DL (ref 0.76–1.27)
DEPRECATED RDW RBC AUTO: 53.2 FL (ref 37–54)
EGFRCR SERPLBLD CKD-EPI 2021: 45.4 ML/MIN/1.73
ERYTHROCYTE [DISTWIDTH] IN BLOOD BY AUTOMATED COUNT: 13.7 % (ref 12.3–15.4)
GLUCOSE BLDC GLUCOMTR-MCNC: 184 MG/DL (ref 70–130)
GLUCOSE BLDC GLUCOMTR-MCNC: 284 MG/DL (ref 70–130)
GLUCOSE SERPL-MCNC: 221 MG/DL (ref 65–99)
HBA1C MFR BLD: 5.1 % (ref 4.8–5.6)
HCT VFR BLD AUTO: 35 % (ref 37.5–51)
HGB BLD-MCNC: 11.9 G/DL (ref 13–17.7)
MCH RBC QN AUTO: 35.8 PG (ref 26.6–33)
MCHC RBC AUTO-ENTMCNC: 34 G/DL (ref 31.5–35.7)
MCV RBC AUTO: 105.4 FL (ref 79–97)
PLATELET # BLD AUTO: 83 10*3/MM3 (ref 140–450)
PMV BLD AUTO: 10.1 FL (ref 6–12)
POTASSIUM SERPL-SCNC: 4.8 MMOL/L (ref 3.5–5.2)
RBC # BLD AUTO: 3.32 10*6/MM3 (ref 4.14–5.8)
SODIUM SERPL-SCNC: 131 MMOL/L (ref 136–145)
WBC NRBC COR # BLD: 8.07 10*3/MM3 (ref 3.4–10.8)

## 2022-10-01 PROCEDURE — 80048 BASIC METABOLIC PNL TOTAL CA: CPT | Performed by: UROLOGY

## 2022-10-01 PROCEDURE — 83036 HEMOGLOBIN GLYCOSYLATED A1C: CPT | Performed by: HOSPITALIST

## 2022-10-01 PROCEDURE — 85027 COMPLETE CBC AUTOMATED: CPT | Performed by: UROLOGY

## 2022-10-01 PROCEDURE — 63710000001 INSULIN LISPRO (HUMAN) PER 5 UNITS: Performed by: HOSPITALIST

## 2022-10-01 PROCEDURE — 82962 GLUCOSE BLOOD TEST: CPT

## 2022-10-01 RX ORDER — FINASTERIDE 5 MG/1
5 TABLET, FILM COATED ORAL DAILY
Status: DISCONTINUED | OUTPATIENT
Start: 2022-10-01 | End: 2022-10-07 | Stop reason: HOSPADM

## 2022-10-01 RX ORDER — NICOTINE POLACRILEX 4 MG
15 LOZENGE BUCCAL
Status: DISCONTINUED | OUTPATIENT
Start: 2022-10-01 | End: 2022-10-07 | Stop reason: HOSPADM

## 2022-10-01 RX ORDER — DEXTROSE MONOHYDRATE 25 G/50ML
25 INJECTION, SOLUTION INTRAVENOUS
Status: DISCONTINUED | OUTPATIENT
Start: 2022-10-01 | End: 2022-10-07 | Stop reason: HOSPADM

## 2022-10-01 RX ORDER — INSULIN LISPRO 100 [IU]/ML
0-7 INJECTION, SOLUTION INTRAVENOUS; SUBCUTANEOUS
Status: DISCONTINUED | OUTPATIENT
Start: 2022-10-01 | End: 2022-10-07 | Stop reason: HOSPADM

## 2022-10-01 RX ORDER — CHOLECALCIFEROL (VITAMIN D3) 125 MCG
5 CAPSULE ORAL NIGHTLY
Status: DISCONTINUED | OUTPATIENT
Start: 2022-10-01 | End: 2022-10-07 | Stop reason: HOSPADM

## 2022-10-01 RX ORDER — PIOGLITAZONEHYDROCHLORIDE 30 MG/1
30 TABLET ORAL DAILY
Status: DISCONTINUED | OUTPATIENT
Start: 2022-10-01 | End: 2022-10-07 | Stop reason: HOSPADM

## 2022-10-01 RX ADMIN — SODIUM CHLORIDE 100 ML/HR: 9 INJECTION, SOLUTION INTRAVENOUS at 09:04

## 2022-10-01 RX ADMIN — INSULIN LISPRO 4 UNITS: 100 INJECTION, SOLUTION INTRAVENOUS; SUBCUTANEOUS at 17:59

## 2022-10-01 RX ADMIN — FINASTERIDE 5 MG: 5 TABLET, FILM COATED ORAL at 17:59

## 2022-10-01 RX ADMIN — CARIPRAZINE 4.5 MG: 4.5 CAPSULE, GELATIN COATED ORAL at 17:59

## 2022-10-01 RX ADMIN — Medication 3 ML: at 10:21

## 2022-10-01 RX ADMIN — OXYCODONE HYDROCHLORIDE AND ACETAMINOPHEN 1 TABLET: 7.5; 325 TABLET ORAL at 23:03

## 2022-10-01 RX ADMIN — OXYCODONE HYDROCHLORIDE AND ACETAMINOPHEN 1 TABLET: 7.5; 325 TABLET ORAL at 18:07

## 2022-10-01 RX ADMIN — SODIUM CHLORIDE 100 ML/HR: 9 INJECTION, SOLUTION INTRAVENOUS at 18:07

## 2022-10-01 RX ADMIN — Medication 5 MG: at 21:25

## 2022-10-01 RX ADMIN — PIOGLITAZONE HYDROCHLORIDE 30 MG: 30 TABLET ORAL at 19:22

## 2022-10-01 RX ADMIN — Medication 3 ML: at 21:26

## 2022-10-01 NOTE — PLAN OF CARE
Problem: Adult Inpatient Plan of Care  Goal: Plan of Care Review  Outcome: Ongoing, Progressing  Goal: Patient-Specific Goal (Individualized)  Outcome: Ongoing, Progressing  Goal: Absence of Hospital-Acquired Illness or Injury  Outcome: Ongoing, Progressing  Intervention: Identify and Manage Fall Risk  Recent Flowsheet Documentation  Taken 10/1/2022 0014 by Brianna Acuña RN  Safety Promotion/Fall Prevention: safety round/check completed  Taken 9/30/2022 2219 by Brianna Acuña RN  Safety Promotion/Fall Prevention: safety round/check completed  Taken 9/30/2022 2029 by Brianna Acuña RN  Safety Promotion/Fall Prevention: safety round/check completed  Intervention: Prevent Skin Injury  Recent Flowsheet Documentation  Taken 9/30/2022 2029 by Brianna Acuña RN  Body Position: position changed independently  Intervention: Prevent and Manage VTE (Venous Thromboembolism) Risk  Recent Flowsheet Documentation  Taken 9/30/2022 2029 by Brianna Acuña RN  Activity Management: (around unit x1) ambulated outside room  VTE Prevention/Management:   bilateral   sequential compression devices on  Range of Motion: active ROM (range of motion) encouraged  Goal: Optimal Comfort and Wellbeing  Outcome: Ongoing, Progressing  Intervention: Provide Person-Centered Care  Recent Flowsheet Documentation  Taken 9/30/2022 2029 by Brianna Acuña RN  Trust Relationship/Rapport:   care explained   choices provided   emotional support provided   empathic listening provided   questions answered   questions encouraged   reassurance provided   thoughts/feelings acknowledged  Goal: Readiness for Transition of Care  Outcome: Ongoing, Progressing  Intervention: Mutually Develop Transition Plan  Recent Flowsheet Documentation  Taken 9/30/2022 2026 by Brianna Acuña RN  Equipment Currently Used at Home: cane, straight     Problem: Bleeding (Surgery Nonspecified)  Goal: Absence of Bleeding  Outcome: Ongoing, Progressing     Problem: Bowel Motility Impaired  (Surgery Nonspecified)  Goal: Effective Bowel Elimination  Outcome: Ongoing, Progressing     Problem: Fluid and Electrolyte Imbalance (Surgery Nonspecified)  Goal: Fluid and Electrolyte Balance  Outcome: Ongoing, Progressing     Problem: Glycemic Control Impaired (Surgery Nonspecified)  Goal: Blood Glucose Level Within Targeted Range  Outcome: Ongoing, Progressing     Problem: Infection (Surgery Nonspecified)  Goal: Absence of Infection Signs and Symptoms  Outcome: Ongoing, Progressing     Problem: Ongoing Anesthesia Effects (Surgery Nonspecified)  Goal: Anesthesia/Sedation Recovery  Outcome: Ongoing, Progressing  Intervention: Optimize Anesthesia Recovery  Recent Flowsheet Documentation  Taken 10/1/2022 0014 by Brianna Acuña RN  Safety Promotion/Fall Prevention: safety round/check completed  Taken 9/30/2022 2219 by Brianna Acuña RN  Safety Promotion/Fall Prevention: safety round/check completed  Taken 9/30/2022 2029 by Brianna Acuña RN  Safety Promotion/Fall Prevention: safety round/check completed     Problem: Pain (Surgery Nonspecified)  Goal: Acceptable Pain Control  Outcome: Ongoing, Progressing  Intervention: Prevent or Manage Pain  Recent Flowsheet Documentation  Taken 9/30/2022 2029 by Brianna Acuña RN  Diversional Activities:   television   smartphone     Problem: Postoperative Nausea and Vomiting (Surgery Nonspecified)  Goal: Nausea and Vomiting Relief  Outcome: Ongoing, Progressing     Problem: Postoperative Urinary Retention (Surgery Nonspecified)  Goal: Effective Urinary Elimination  Outcome: Ongoing, Progressing     Problem: Respiratory Compromise (Surgery Nonspecified)  Goal: Effective Oxygenation and Ventilation  Outcome: Ongoing, Progressing  Intervention: Optimize Oxygenation and Ventilation  Recent Flowsheet Documentation  Taken 9/30/2022 2029 by Brianna Acuña RN  Head of Bed (HOB) Positioning: HOB elevated   Goal Outcome Evaluation:

## 2022-10-01 NOTE — CONSULTS
CONSULT NOTE    INTERNAL MEDICINE   [facility]       Patient Identification:  Name: Teto Castle  Age: 82 y.o.  Sex: male  :  1940  MRN: [id number]             Date of Consultation:  10/1/2022        Primary Care Physician: Cam Dickey MD                               Requesting Physician: DR Dickerson  Reason for Consultation:medical management    Chief Complaint:  Had bladder surgery pain post op  History of Present Illness:         The patient is an 82-year-old male who was admitted by urology for surgery for a bladder diverticulum.  The patient has past medical history of aortic aneurysm, anxiety depression, arthritis, diabetes, hypertension, BPH, cirrhosis, chronic UTI, hard of hearing, history of MI and urinary retention requiring self catheterizations who was admitted for elective surgery.  Medicine was consulted postoperatively for general medical management of medical problems.  Postoperatively he is complaining of having some discomfort from the surgery.  He does have catheter in place and has bloody urine draining.  He denies having any chest pain or shortness of air.  He has not had any nausea or vomiting.      Past Medical History:  Past Medical History:   Diagnosis Date   • Aneurysm of aorta (HCC)    • Anxiety and depression    • Arthritis    • BPH (benign prostatic hyperplasia)    • Chronic UTI    • Cirrhosis (HCC)    • Diabetes mellitus (HCC)    • H/O esophageal varices    • Hard of hearing    • History of frequent urinary tract infections    • History of MI (myocardial infarction)     STATES WAS TOLD HE HAD IN PAST, NEVER SAW CARDIOLOGY   • Hypertension    • Pulmonary nodules    • Self-catheterizes urinary bladder      Past Surgical History:  Past Surgical History:   Procedure Laterality Date   • CATARACT EXTRACTION, BILATERAL     • COLONOSCOPY  approx     normal per patient   • CYSTOSCOPY TRANSURETHRAL RESECTION OF PROSTATE N/A 9/10/2018    Procedure:  TRANSURETHRAL RESECTION OF PROSTATE;  Surgeon: Art Dickerson MD;  Location: [facility] MAIN OR;  Service: Urology   • ENDOSCOPIC FUNCTIONAL SINUS SURGERY (FESS) N/A 5/15/2019    Procedure: ETHMOIDECTOMY,LEFT INTERNASAL  ANTROSTOMY;  Surgeon: Mark Linda MD;  [place] [facility] [place] [facility]  Service: ENT   • FRACTURE SURGERY      LT ARM   • HEMORRHOIDECTOMY     • SEPTOPLASTY N/A 5/15/2019    Procedure: NASAL SEPTOPLASTY,;  Surgeon: Mark Linda MD;  [place] [facility] [place] [facility]  Service: ENT      Home Meds:  Medications Prior to Admission   Medication Sig Dispense Refill Last Dose   • Cariprazine HCl (VRAYLAR) 1.5 MG capsule capsule Take 4.5 mg by mouth Every Evening.   9/29/2022 at 1730   • finasteride (PROSCAR) 5 MG tablet Take 5 mg by mouth Daily.   9/29/2022 at 0900   • Insulin Degludec (TRESIBA FLEXTOUCH SC) Inject 12-15 Units under the skin into the appropriate area as directed As Needed. PER SLIDING SCALE   9/29/2022 at 0900   • lisinopril (PRINIVIL,ZESTRIL) 5 MG tablet Take 5 mg by mouth Daily.   9/29/2022 at 1730   • meloxicam (MOBIC) 7.5 MG tablet Take 7.5 mg by mouth Daily. TO HOLD 1 WEEK BEFORE SURGERY   9/29/2022 at 0900   • metFORMIN (GLUCOPHAGE) 500 MG tablet Take 900 mg by mouth 2 (Two) Times a Day With Meals.   9/29/2022 at 1730   • nitrofurantoin, macrocrystal-monohydrate, (MACROBID) 100 MG capsule Take 100 mg by mouth Daily.   9/29/2022 at 1730   • pioglitazone (ACTOS) 30 MG tablet Take 30 mg by mouth Daily.   9/29/2022 at 0900   • vitamin B-12 (CYANOCOBALAMIN) 500 MCG tablet Take 500 mcg by mouth Daily.   9/29/2022 at 1730   • melatonin 5 MG tablet tablet Take 5 mg by mouth Every Night.   More than a month at Unknown time     Current Meds:     Current Facility-Administered Medications:   •  HYDROmorphone (DILAUDID) injection 0.5 mg, 0.5 mg, Intravenous, Q2H PRN, 0.5 mg at 09/30/22 0008 **AND** naloxone (NARCAN) injection 0.1 mg, 0.1 mg, Intravenous, Q5 Min PRN, Art Dickerson MD  •   ondansetron (ZOFRAN) tablet 4 mg, 4 mg, Oral, Q6H PRN **OR** ondansetron (ZOFRAN) injection 4 mg, 4 mg, Intravenous, Q6H PRN, Art Dickerson MD  •  oxyCODONE-acetaminophen (PERCOCET) 7.5-325 MG per tablet 1 tablet, 1 tablet, Oral, Q4H PRN, Art Dickerson MD  •  sodium chloride 0.9 % flush 10 mL, 10 mL, Intravenous, PRN, Art Dickerson MD  •  sodium chloride 0.9 % flush 3 mL, 3 mL, Intravenous, Q12H, Art Dickerson MD, 3 mL at 10/01/22 1021  •  sodium chloride 0.9 % infusion, 100 mL/hr, Intravenous, Continuous, Art Dickerson MD, Last Rate: 100 mL/hr at 10/01/22 0904, 100 mL/hr at 10/01/22 0904  Allergies:  Allergies   Allergen Reactions   • Morphine Itching and Confusion   • Penicillins Other (See Comments)     REACTION UNKNOWN; patient reports no allergy to pcn.       Social History:   Social History     Socioeconomic History   • Marital status:    Tobacco Use   • Smoking status: Former Smoker     Types: Cigars, Cigarettes   • Smokeless tobacco: Current User     Types: Chew, Snuff   • Tobacco comment: 25 YEARS AGO   Vaping Use   • Vaping Use: Never used   Substance and Sexual Activity   • Alcohol use: Yes     Comment: DRINKS BEERS EVERY OTHER DAY   • Drug use: No   • Sexual activity: Defer     Family History:  Family History   Problem Relation Age of Onset   • Malig Hyperthermia Neg Hx           Review of Systems  See history of present illness and past medical history.  Patient denies headache, dizziness, syncope, falls, trauma, change in vision, change in hearing, change in taste, changes in weight, changes in appetite, focal weakness, numbness, or paresthesia.  Patient denies chest pain, palpitations, dyspnea, orthopnea, PND, cough, sinus pressure, rhinorrhea, epistaxis, hemoptysis, nausea, vomiting,hematemesis, diarrhea, constipation or hematchezia.  Denies cold or heat intolerance, polydipsia, polyuria, polyphagia. Denies  pyuria, dysuria, hesitancy, frequency or  "urgency.  Denies fever, chills, sweats, night sweats.  Denies missing any routine medications. Remainder of ROS is negative.      Vitals:   /74 (BP Location: Right arm, Patient Position: Sitting)   Pulse 77   Temp 97.6 °F (36.4 °C) (Oral)   Resp 16   Ht 175.3 cm (69.02\")   Wt 100 kg (220 lb 7.4 oz)   SpO2 98%   BMI 32.54 kg/m²   I/O:     Intake/Output Summary (Last 24 hours) at 10/1/2022 1636  Last data filed at 10/1/2022 0436  Gross per 24 hour   Intake 900 ml   Output 100 ml   Net 800 ml     Exam:  General Appearance:    Alert, cooperative, no distress, appears stated age   Head:    Normocephalic, without obvious abnormality, atraumatic   Eyes:    PERRL, conjunctivae/corneas clear, EOM's intact, both eyes   Ears:    Normal external ear canals, both ears   Nose:   Nares normal, septum midline, mucosa normal, no drainage    or sinus tenderness   Throat:   Lips, tongue, gums normal; oral mucosa pink and moist   Neck:   Supple, symmetrical, trachea midline, no adenopathy;     thyroid:  no enlargement/tenderness/nodules; no carotid    bruit or JVD   Back:     Symmetric, no curvature, ROM normal, no CVA tenderness   Lungs:     Clear to auscultation bilaterally, respirations unlabored   Chest Wall:    No tenderness or deformity    Heart:    Regular rate and rhythm, S1 and S2 normal, no murmur, rub   or gallop   Abdomen:     Soft, nontender, bowel sounds active all four quadrants,     no masses, no hepatomegaly, no splenomegaly   Extremities:   Extremities normal, atraumatic, no cyanosis or edema   Pulses:   Pulses palpable in all extremities; symmetric all extremities   Skin:   Skin color normal, Skin is warm and dry,  no rashes or palpable lesions   Neurologic:   CNII-XII intact, motor strength grossly intact, sensation grossly intact to light touch, no focal deficits noted       Data Review:  WBC   Date Value Ref Range Status   10/01/2022 8.07 3.40 - 10.80 10*3/mm3 Final     Hemoglobin   Date Value Ref " Range Status   10/01/2022 11.9 (L) 13.0 - 17.7 g/dL Final     Hematocrit   Date Value Ref Range Status   10/01/2022 35.0 (L) 37.5 - 51.0 % Final     Platelets   Date Value Ref Range Status   10/01/2022 83 (L) 140 - 450 10*3/mm3 Final     Sodium   Date Value Ref Range Status   10/01/2022 131 (L) 136 - 145 mmol/L Final     Potassium   Date Value Ref Range Status   10/01/2022 4.8 3.5 - 5.2 mmol/L Final     Chloride   Date Value Ref Range Status   10/01/2022 97 (L) 98 - 107 mmol/L Final     CO2   Date Value Ref Range Status   10/01/2022 19.8 (L) 22.0 - 29.0 mmol/L Final     BUN   Date Value Ref Range Status   10/01/2022 17 8 - 23 mg/dL Final     Creatinine   Date Value Ref Range Status   10/01/2022 1.53 (H) 0.76 - 1.27 mg/dL Final     Glucose   Date Value Ref Range Status   10/01/2022 221 (H) 65 - 99 mg/dL Final     Calcium   Date Value Ref Range Status   10/01/2022 8.6 8.6 - 10.5 mg/dL Final     No results found for: AST, ALT, ALKPHOS  No results found for: APTT, INR  No results found for: COLORU, CLARITYU, SPECGRAV, PHUR, PROTEINUR, GLUCOSEU, KETONESU, BLOODU, NITRITE, LEUKOCYTESUR, BILIRUBINUR, UROBILINOGEN, RBCUA, WBCUA, BACTERIA, UACOMMENT  No results found for: TROPONINT, TROPONINI, BNP  No components found for: HGBA1C;2  No components found for: TSH;2            Imaging Results (Last 7 Days)     ** No results found for the last 168 hours. **        Past Medical History:   Diagnosis Date   • Aneurysm of aorta (HCC)    • Anxiety and depression    • Arthritis    • BPH (benign prostatic hyperplasia)    • Chronic UTI    • Cirrhosis (HCC)    • Diabetes mellitus (HCC)    • H/O esophageal varices    • Hard of hearing    • History of frequent urinary tract infections    • History of MI (myocardial infarction)     STATES WAS TOLD HE HAD IN PAST, NEVER SAW CARDIOLOGY   • Hypertension    • Pulmonary nodules    • Self-catheterizes urinary bladder        Assessment:  Active Hospital Problems    Diagnosis  POA   • **Acquired  bladder diverticulum [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       Plan:  We will go ahead and reorder some of his home medicines.  We will follow-up for general medical management.  We will also add some sliding scale insulin.  Thank you for this interesting consult.    Austyn Velazco MD   10/1/2022  16:36 EDT

## 2022-10-01 NOTE — PLAN OF CARE
Goal Outcome Evaluation:  Plan of Care Reviewed With: patient        Progress: improving   Pt improving throughout the shift. PRN irrigation with sterile water per urology. Completed twice today, new irrigation syringe used each time. Small clot pulled back once, has been draining well since last irrigation around 1100. Urine pink tinged. Dressing changed, with some shadowing. Bleeding around incision site controlled since this afternoon. Ambulated twice with staff assist and walker. Advanced to full liquid, not passing gas at this time. VSS

## 2022-10-01 NOTE — PROGRESS NOTES
POD 1 s/p SPT insertion, bladder diverticulectomy    Patient relaxing in chair  Jones draining light pink urine  Jones intact, flushed and irrigated small to medium size tissue/clot  Incision with some bloody drainage, staples intact, no erythema, redressed  AVSS    Cr 1.5  WBC 8.07  Hgb 11.9    Plan:  Gently flush jones with 60 cc sterile water if urine gets bloody  Ambulate with assistance  Will follow Cr

## 2022-10-02 LAB
ANION GAP SERPL CALCULATED.3IONS-SCNC: 8 MMOL/L (ref 5–15)
BUN SERPL-MCNC: 21 MG/DL (ref 8–23)
BUN/CREAT SERPL: 12.8 (ref 7–25)
CALCIUM SPEC-SCNC: 8.5 MG/DL (ref 8.6–10.5)
CHLORIDE SERPL-SCNC: 104 MMOL/L (ref 98–107)
CO2 SERPL-SCNC: 21 MMOL/L (ref 22–29)
CREAT SERPL-MCNC: 1.64 MG/DL (ref 0.76–1.27)
DEPRECATED RDW RBC AUTO: 52.9 FL (ref 37–54)
EGFRCR SERPLBLD CKD-EPI 2021: 41.8 ML/MIN/1.73
ERYTHROCYTE [DISTWIDTH] IN BLOOD BY AUTOMATED COUNT: 13.8 % (ref 12.3–15.4)
GLUCOSE BLDC GLUCOMTR-MCNC: 147 MG/DL (ref 70–130)
GLUCOSE BLDC GLUCOMTR-MCNC: 164 MG/DL (ref 70–130)
GLUCOSE BLDC GLUCOMTR-MCNC: 218 MG/DL (ref 70–130)
GLUCOSE SERPL-MCNC: 164 MG/DL (ref 65–99)
HCT VFR BLD AUTO: 31.1 % (ref 37.5–51)
HGB BLD-MCNC: 10.6 G/DL (ref 13–17.7)
MCH RBC QN AUTO: 35.7 PG (ref 26.6–33)
MCHC RBC AUTO-ENTMCNC: 34.1 G/DL (ref 31.5–35.7)
MCV RBC AUTO: 104.7 FL (ref 79–97)
PLATELET # BLD AUTO: 70 10*3/MM3 (ref 140–450)
PMV BLD AUTO: 9.5 FL (ref 6–12)
POTASSIUM SERPL-SCNC: 4.8 MMOL/L (ref 3.5–5.2)
RBC # BLD AUTO: 2.97 10*6/MM3 (ref 4.14–5.8)
SODIUM SERPL-SCNC: 133 MMOL/L (ref 136–145)
WBC NRBC COR # BLD: 7.67 10*3/MM3 (ref 3.4–10.8)

## 2022-10-02 PROCEDURE — 63710000001 INSULIN LISPRO (HUMAN) PER 5 UNITS: Performed by: HOSPITALIST

## 2022-10-02 PROCEDURE — 85027 COMPLETE CBC AUTOMATED: CPT | Performed by: UROLOGY

## 2022-10-02 PROCEDURE — 80048 BASIC METABOLIC PNL TOTAL CA: CPT | Performed by: UROLOGY

## 2022-10-02 PROCEDURE — 82962 GLUCOSE BLOOD TEST: CPT

## 2022-10-02 RX ADMIN — CARIPRAZINE 4.5 MG: 4.5 CAPSULE, GELATIN COATED ORAL at 17:23

## 2022-10-02 RX ADMIN — SODIUM CHLORIDE 100 ML/HR: 9 INJECTION, SOLUTION INTRAVENOUS at 03:48

## 2022-10-02 RX ADMIN — INSULIN LISPRO 2 UNITS: 100 INJECTION, SOLUTION INTRAVENOUS; SUBCUTANEOUS at 13:19

## 2022-10-02 RX ADMIN — Medication 3 ML: at 20:54

## 2022-10-02 RX ADMIN — INSULIN LISPRO 3 UNITS: 100 INJECTION, SOLUTION INTRAVENOUS; SUBCUTANEOUS at 20:55

## 2022-10-02 RX ADMIN — FINASTERIDE 5 MG: 5 TABLET, FILM COATED ORAL at 09:11

## 2022-10-02 RX ADMIN — PIOGLITAZONE HYDROCHLORIDE 30 MG: 30 TABLET ORAL at 09:11

## 2022-10-02 RX ADMIN — Medication 5 MG: at 20:54

## 2022-10-02 RX ADMIN — SODIUM CHLORIDE 100 ML/HR: 9 INJECTION, SOLUTION INTRAVENOUS at 23:01

## 2022-10-02 RX ADMIN — OXYCODONE HYDROCHLORIDE AND ACETAMINOPHEN 1 TABLET: 7.5; 325 TABLET ORAL at 11:41

## 2022-10-02 RX ADMIN — OXYCODONE HYDROCHLORIDE AND ACETAMINOPHEN 1 TABLET: 7.5; 325 TABLET ORAL at 18:24

## 2022-10-02 RX ADMIN — SODIUM CHLORIDE 100 ML/HR: 9 INJECTION, SOLUTION INTRAVENOUS at 13:19

## 2022-10-02 RX ADMIN — Medication 3 ML: at 09:11

## 2022-10-02 NOTE — PROGRESS NOTES
"DAILY PROGRESS NOTE  [facility]    Patient Identification:  Name: Teto Castle  Age: 82 y.o.  Sex: male  :  1940  MRN: [id number]         Primary Care Physician: Cam Dickey MD    Subjective:  Interval History:He complains of pain.    Objective:    Scheduled Meds:Cariprazine HCl, 4.5 mg, Oral, Q PM  finasteride, 5 mg, Oral, Daily  insulin lispro, 0-7 Units, Subcutaneous, TID AC  melatonin, 5 mg, Oral, Nightly  pioglitazone, 30 mg, Oral, Daily  sodium chloride, 3 mL, Intravenous, Q12H      Continuous Infusions:sodium chloride, 100 mL/hr, Last Rate: 100 mL/hr (10/02/22 0348)        Vital signs in last 24 hours:  Temp:  [97.1 °F (36.2 °C)-97.6 °F (36.4 °C)] 97.3 °F (36.3 °C)  Heart Rate:  [70-79] 79  Resp:  [16-18] 18  BP: (106-140)/(60-79) 108/68    Intake/Output:    Intake/Output Summary (Last 24 hours) at 10/2/2022 1154  Last data filed at 10/2/2022 1041  Gross per 24 hour   Intake 2186 ml   Output 775 ml   Net 1411 ml       Exam:  /68 (BP Location: Right arm, Patient Position: Lying)   Pulse 79   Temp 97.3 °F (36.3 °C) (Oral)   Resp 18   Ht 175.3 cm (69.02\")   Wt 100 kg (220 lb 7.4 oz)   SpO2 99%   BMI 32.54 kg/m²     General Appearance:    Alert, cooperative, no distress   Head:    Normocephalic, without obvious abnormality, atraumatic   Eyes:       Throat:   Lips, tongue, gums normal   Neck:   Supple, symmetrical, trachea midline, no JVD   Lungs:     Clear to auscultation bilaterally, respirations unlabored   Chest Wall:    No tenderness or deformity    Heart:    Regular rate and rhythm, S1 and S2 normal, no murmur,no  Rub or gallop   Abdomen:     Soft, nontender, bowel sounds active, no masses, no organomegaly    Extremities:   Extremities normal, atraumatic, no cyanosis or edema   Pulses:      Skin:   Skin is warm and dry,  no rashes or palpable lesions   Neurologic:   no focal deficits noted      Lab Results (last 72 hours)     Procedure Component Value Units " Date/Time    Basic Metabolic Panel [[id number]]  (Abnormal) Collected: 10/02/22 0526    Specimen: Blood Updated: 10/02/22 0610     Glucose 164 mg/dL      BUN 21 mg/dL      Creatinine 1.64 mg/dL      Sodium 133 mmol/L      Potassium 4.8 mmol/L      Chloride 104 mmol/L      CO2 21.0 mmol/L      Calcium 8.5 mg/dL      BUN/Creatinine Ratio 12.8     Anion Gap 8.0 mmol/L      eGFR 41.8 mL/min/1.73      Comment: National Kidney Foundation and American Society of Nephrology (ASN) Task Force recommended calculation based on the Chronic Kidney Disease Epidemiology Collaboration (CKD-EPI) equation refit without adjustment for race.       Narrative:      GFR Normal >60  Chronic Kidney Disease <60  Kidney Failure <15      CBC (No Diff) [[id number]]  (Abnormal) Collected: 10/02/22 0526    Specimen: Blood Updated: 10/02/22 0553     WBC 7.67 10*3/mm3      RBC 2.97 10*6/mm3      Hemoglobin 10.6 g/dL      Hematocrit 31.1 %      .7 fL      MCH 35.7 pg      MCHC 34.1 g/dL      RDW 13.8 %      RDW-SD 52.9 fl      MPV 9.5 fL      Platelets 70 10*3/mm3     POC Glucose Once [[id number]]  (Abnormal) Collected: 10/01/22 2103    Specimen: Blood Updated: 10/01/22 2105     Glucose 184 mg/dL      Comment: Meter: [id number] : [id number] Anthony Desiraealeksey GOMEZ       Hemoglobin A1c [[id number]]  (Normal) Collected: 10/01/22 0729    Specimen: Blood Updated: 10/01/22 2023     Hemoglobin A1C 5.10 %     Narrative:      Hemoglobin A1C Ranges:    Increased Risk for Diabetes  5.7% to 6.4%  Diabetes                     >= 6.5%  Diabetic Goal                < 7.0%    POC Glucose Once [[id number]]  (Abnormal) Collected: 10/01/22 1649    Specimen: Blood Updated: 10/01/22 1651     Glucose 284 mg/dL      Comment: Meter: [id number] : [id number] Josemanuel GOMEZ       CBC (No Diff) [[id number]]  (Abnormal) Collected: 10/01/22 0729    Specimen: Blood Updated: 10/01/22 0845     WBC 8.07 10*3/mm3      RBC 3.32 10*6/mm3      Hemoglobin 11.9 g/dL       Hematocrit 35.0 %      .4 fL      MCH 35.8 pg      MCHC 34.0 g/dL      RDW 13.7 %      RDW-SD 53.2 fl      MPV 10.1 fL      Platelets 83 10*3/mm3     Basic Metabolic Panel [[id number]]  (Abnormal) Collected: 10/01/22 0729    Specimen: Blood Updated: 10/01/22 0808     Glucose 221 mg/dL      BUN 17 mg/dL      Creatinine 1.53 mg/dL      Sodium 131 mmol/L      Potassium 4.8 mmol/L      Chloride 97 mmol/L      CO2 19.8 mmol/L      Calcium 8.6 mg/dL      BUN/Creatinine Ratio 11.1     Anion Gap 14.2 mmol/L      eGFR 45.4 mL/min/1.73      Comment: National Kidney Foundation and American Society of Nephrology (ASN) Task Force recommended calculation based on the Chronic Kidney Disease Epidemiology Collaboration (CKD-EPI) equation refit without adjustment for race.       Narrative:      GFR Normal >60  Chronic Kidney Disease <60  Kidney Failure <15      Tissue Pathology Exam [[id number]] Collected: 09/30/22 1741    Specimen: Tissue from Urinary Bladder Updated: 09/30/22 2215    POC Glucose Once [[id number]]  (Normal) Collected: 09/30/22 1841    Specimen: Blood Updated: 09/30/22 1843     Glucose 99 mg/dL      Comment: Meter: [id number] : [id number] Ann Augustin RN       POC Glucose Once [[id number]]  (Normal) Collected: 09/30/22 1334    Specimen: Blood Updated: 09/30/22 1407     Glucose 117 mg/dL      Comment: Meter: [id number] : [id number] Richar GOMEZ           Data Review:  Results from last 7 days   Lab Units 10/02/22  0526 10/01/22  0729   SODIUM mmol/L 133* 131*   POTASSIUM mmol/L 4.8 4.8   CHLORIDE mmol/L 104 97*   CO2 mmol/L 21.0* 19.8*   BUN mg/dL 21 17   CREATININE mg/dL 1.64* 1.53*   GLUCOSE mg/dL 164* 221*   CALCIUM mg/dL 8.5* 8.6     Results from last 7 days   Lab Units 10/02/22  0526 10/01/22  0729   WBC 10*3/mm3 7.67 8.07   HEMOGLOBIN g/dL 10.6* 11.9*   HEMATOCRIT % 31.1* 35.0*   PLATELETS 10*3/mm3 70* 83*         Results from last 7 days   Lab Units 10/01/22  0729   HEMOGLOBIN A1C %  5.10     Lab Results   Lab Value Date/Time    TROPONINT <0.010 08/01/2022 1607    TROPONINT <0.010 09/28/2021 1400    TROPONINT <0.01 01/19/2021 1303               Invalid input(s): PROT, LABALBU      Results from last 7 days   Lab Units 10/01/22  0729   HEMOGLOBIN A1C % 5.10     Glucose   Date/Time Value Ref Range Status   10/01/2022 2103 184 (H) 70 - 130 mg/dL Final     Comment:     Meter: [id number] : [id number] Anthony Simmons NA   10/01/2022 1649 284 (H) 70 - 130 mg/dL Final     Comment:     Meter: [id number] : [id number] Josemanuel El NA   09/30/2022 1841 99 70 - 130 mg/dL Final     Comment:     Meter: [id number] : [id number] Ann Yahir Augustin RN   09/30/2022 1334 117 70 - 130 mg/dL Final     Comment:     Meter: [id number] : [id number] Richar GOMEZ           Past Medical History:   Diagnosis Date   • Aneurysm of aorta (HCC)    • Anxiety and depression    • Arthritis    • BPH (benign prostatic hyperplasia)    • Chronic UTI    • Cirrhosis (HCC)    • Diabetes mellitus (HCC)    • H/O esophageal varices    • Hard of hearing    • History of frequent urinary tract infections    • History of MI (myocardial infarction)     STATES WAS TOLD HE HAD IN PAST, NEVER SAW CARDIOLOGY   • Hypertension    • Pulmonary nodules    • Self-catheterizes urinary bladder        Assessment:  Active Hospital Problems    Diagnosis  POA   • **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       Plan:  Continue with current RX. Follow lab. Glycemic control.    Austyn Velazco MD  10/2/2022  11:54 EDT

## 2022-10-02 NOTE — PLAN OF CARE
Goal Outcome Evaluation: Pt remains A&Ox4, resting in bed comfortably. Pt requested PRN pain medication x1. Suprapubic catheter dressing changed once; urine is reddish/pink.

## 2022-10-02 NOTE — PLAN OF CARE
Goal Outcome Evaluation:              Outcome Evaluation: Pt resting in bed, A&Ox4. x1 dose of PRN pain medication given overnight. subrapubic cath had pink/yellow overnight. SCDs on. IV infusing NS @ 100ml. Pt remains in contact precaution. VSS

## 2022-10-02 NOTE — PROGRESS NOTES
"POD 2 s/p SPT insertion, bladder diverticulectomy  Daughter at bedside    \"I feel pretty good\"    AVSS  Patient resting in chair  Pain controlled    Jones intact, light pink urine  Incision with staples intact, no active drainage, blood on dressing    Cr 1.64 (1.53)  WBC 7.67  Hgb 10.6    Plan:  Will follow Cr  Ambulate with assistance  Continue to gently flush jones with 60cc sterile water if urine gets bloody          "

## 2022-10-03 LAB
ANION GAP SERPL CALCULATED.3IONS-SCNC: 5.7 MMOL/L (ref 5–15)
BUN SERPL-MCNC: 23 MG/DL (ref 8–23)
BUN/CREAT SERPL: 13.7 (ref 7–25)
CALCIUM SPEC-SCNC: 8.4 MG/DL (ref 8.6–10.5)
CHLORIDE SERPL-SCNC: 104 MMOL/L (ref 98–107)
CO2 SERPL-SCNC: 21.3 MMOL/L (ref 22–29)
CREAT SERPL-MCNC: 1.68 MG/DL (ref 0.76–1.27)
DEPRECATED RDW RBC AUTO: 52.2 FL (ref 37–54)
EGFRCR SERPLBLD CKD-EPI 2021: 40.6 ML/MIN/1.73
ERYTHROCYTE [DISTWIDTH] IN BLOOD BY AUTOMATED COUNT: 13.7 % (ref 12.3–15.4)
GLUCOSE BLDC GLUCOMTR-MCNC: 159 MG/DL (ref 70–130)
GLUCOSE BLDC GLUCOMTR-MCNC: 177 MG/DL (ref 70–130)
GLUCOSE BLDC GLUCOMTR-MCNC: 207 MG/DL (ref 70–130)
GLUCOSE SERPL-MCNC: 147 MG/DL (ref 65–99)
HCT VFR BLD AUTO: 28.4 % (ref 37.5–51)
HGB BLD-MCNC: 9.8 G/DL (ref 13–17.7)
LAB AP CASE REPORT: NORMAL
MCH RBC QN AUTO: 36 PG (ref 26.6–33)
MCHC RBC AUTO-ENTMCNC: 34.5 G/DL (ref 31.5–35.7)
MCV RBC AUTO: 104.4 FL (ref 79–97)
PATH REPORT.FINAL DX SPEC: NORMAL
PATH REPORT.GROSS SPEC: NORMAL
PLATELET # BLD AUTO: 69 10*3/MM3 (ref 140–450)
PMV BLD AUTO: 9.8 FL (ref 6–12)
POTASSIUM SERPL-SCNC: 4.6 MMOL/L (ref 3.5–5.2)
RBC # BLD AUTO: 2.72 10*6/MM3 (ref 4.14–5.8)
SODIUM SERPL-SCNC: 131 MMOL/L (ref 136–145)
WBC NRBC COR # BLD: 6.69 10*3/MM3 (ref 3.4–10.8)

## 2022-10-03 PROCEDURE — 82962 GLUCOSE BLOOD TEST: CPT

## 2022-10-03 PROCEDURE — 85027 COMPLETE CBC AUTOMATED: CPT | Performed by: UROLOGY

## 2022-10-03 PROCEDURE — 63710000001 INSULIN LISPRO (HUMAN) PER 5 UNITS: Performed by: HOSPITALIST

## 2022-10-03 PROCEDURE — 80048 BASIC METABOLIC PNL TOTAL CA: CPT | Performed by: UROLOGY

## 2022-10-03 RX ADMIN — FINASTERIDE 5 MG: 5 TABLET, FILM COATED ORAL at 08:25

## 2022-10-03 RX ADMIN — Medication 3 ML: at 21:11

## 2022-10-03 RX ADMIN — SODIUM CHLORIDE 100 ML/HR: 9 INJECTION, SOLUTION INTRAVENOUS at 18:05

## 2022-10-03 RX ADMIN — Medication 5 MG: at 21:11

## 2022-10-03 RX ADMIN — Medication 3 ML: at 13:47

## 2022-10-03 RX ADMIN — OXYCODONE HYDROCHLORIDE AND ACETAMINOPHEN 1 TABLET: 7.5; 325 TABLET ORAL at 01:34

## 2022-10-03 RX ADMIN — INSULIN LISPRO 2 UNITS: 100 INJECTION, SOLUTION INTRAVENOUS; SUBCUTANEOUS at 08:25

## 2022-10-03 RX ADMIN — OXYCODONE HYDROCHLORIDE AND ACETAMINOPHEN 1 TABLET: 7.5; 325 TABLET ORAL at 21:11

## 2022-10-03 RX ADMIN — PIOGLITAZONE HYDROCHLORIDE 30 MG: 30 TABLET ORAL at 08:25

## 2022-10-03 RX ADMIN — OXYCODONE HYDROCHLORIDE AND ACETAMINOPHEN 1 TABLET: 7.5; 325 TABLET ORAL at 08:25

## 2022-10-03 RX ADMIN — SODIUM CHLORIDE 100 ML/HR: 9 INJECTION, SOLUTION INTRAVENOUS at 08:24

## 2022-10-03 RX ADMIN — CARIPRAZINE 4.5 MG: 4.5 CAPSULE, GELATIN COATED ORAL at 18:05

## 2022-10-03 RX ADMIN — INSULIN LISPRO 3 UNITS: 100 INJECTION, SOLUTION INTRAVENOUS; SUBCUTANEOUS at 18:04

## 2022-10-03 RX ADMIN — INSULIN LISPRO 2 UNITS: 100 INJECTION, SOLUTION INTRAVENOUS; SUBCUTANEOUS at 13:45

## 2022-10-03 RX ADMIN — OXYCODONE HYDROCHLORIDE AND ACETAMINOPHEN 1 TABLET: 7.5; 325 TABLET ORAL at 13:45

## 2022-10-03 NOTE — DISCHARGE PLACEMENT REQUEST
"Luciano De Leon (82 y.o. Male)             Date of Birth   1940    Social Security Number       Address   [address] [place] [address]    Home Phone   [phone]    MRN   [id number]       [facility]    Marital Status                               Admission Date   9/30/22    Admission Type   Elective    Admitting Provider   Art Dickerson MD    Attending Provider   Art Dickerson MD    Department, Room/Bed   [address], P380/1       Discharge Date       Discharge Disposition       Discharge Destination                               Attending Provider: Art Dickerson MD    Allergies: Morphine, Penicillins    Isolation: None   Infection: COVID (History) (09/30/22)   Code Status: CPR   Advance Care Planning Activity    Ht: 175.3 cm (69.02\")   Wt: 100 kg (220 lb 7.4 oz)    Admission Cmt: None   Principal Problem: 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]                 Active Insurance as of 9/30/2022     Primary Coverage     Payor Plan Insurance Group Employer/Plan Group    HUMANA MEDICARE REPLACEMENT HUMANA MEDICARE REPLACEMENT [id number]     Payor Plan Address Payor Plan Phone Number Payor Plan Fax Number Effective Dates    [address] [address] [phone]  1/1/2021 - None Entered    [facility] [id number]       Subscriber Name Subscriber Birth Date Member ID       LUCIANO DE LEON 1940 [id number]                 Emergency Contacts      (Rel.) Home Phone Work Phone Mobile Phone    Lucia Child (Daughter) [phone] -- [phone]    TinChandni (Daughter) [phone] -- [phone]            "

## 2022-10-03 NOTE — CASE MANAGEMENT/SOCIAL WORK
Discharge Planning Assessment  [facility]     Patient Name: Teto Castle  MRN: [id number]  Today's Date: 10/3/2022    Admit Date: 9/30/2022    Plan: [facility] to [facility]   Discharge Needs Assessment     Row Name 10/03/22 1320       Living Environment    People in Home spouse    Name(s) of People in Home Hoa    Current Living Arrangements apartment    Primary Care Provided by self    Provides Primary Care For no one    Family Caregiver if Needed child(juancho), adult    Quality of Family Relationships helpful;involved;supportive    Able to Return to Prior Arrangements other (see comments)  Patient has requested a referral to SNF       Resource/Environmental Concerns    Resource/Environmental Concerns none    Transportation Concerns none       Transition Planning    Patient/Family Anticipates Transition to inpatient rehabilitation facility    Patient/Family Anticipated Services at Transition none    Transportation Anticipated family or friend will provide       Discharge Needs Assessment    Readmission Within the Last 30 Days no previous admission in last 30 days    Equipment Currently Used at Home glucometer    Concerns to be Addressed no discharge needs identified;denies needs/concerns at this time    Anticipated Changes Related to Illness none    Equipment Needed After Discharge none    Provided Post Acute Provider List? N/A    N/A Provider List Comment Patient has requested a referral to [facility]    Provided Post Acute Provider Quality & Resource List? N/A    N/A Quality & Resource List Comment Patient has requested a referral to [facility]    Patient's Choice of Community Agency(s) N/A               Discharge Plan     Row Name 10/03/22 1321       Plan    Plan Referral to [facility]    Patient/Family in Agreement with Plan yes    Plan Comments [facility] met with patient and his daughter Blas with patient’s verbal permission, introduced self and explained role. Patient states the  information on his face sheet is accurate. Patient states that he lives at home with his wife Hoa. Patient confirmed his PCP is Cam Dickey. Patient denies a history of HH/SNF. Patient states he uses his glucometer and checks his sugar in the morning and the evenings. Patient is enrolled in the [facility] program. Patient denies any stairs to get to his apartment or within his apartment. Patient has requested a referral to [facility] for SNF at discharge. Patient states his wife or one of his daughters can transport him at discharge. CCP to follow for discharge needs.              Continued Care and Services - Admitted Since 9/30/2022    Coordination has not been started for this encounter.          Demographic Summary     Row Name 10/03/22 1319       General Information    Admission Type inpatient    Arrived From emergency department    Reason for Consult discharge planning    Preferred Language English               Functional Status     Row Name 10/03/22 1319       Functional Status    Usual Activity Tolerance good    Current Activity Tolerance moderate       Functional Status, IADL    Medications independent    Meal Preparation independent    Housekeeping independent    Laundry independent    Shopping independent       Mental Status    General Appearance WDL WDL       Mental Status Summary    Recent Changes in Mental Status/Cognitive Functioning no changes       Employment/    Employment Status retired               Psychosocial    No documentation.                Abuse/Neglect    No documentation.                Legal    No documentation.                Substance Abuse    No documentation.                Patient Forms    No documentation.

## 2022-10-03 NOTE — PROGRESS NOTES
"DAILY PROGRESS NOTE  [facility]    Patient Identification:  Name: Teto Castle  Age: 82 y.o.  Sex: male  :  1940  MRN: [id number]         Primary Care Physician: Cam Dickey MD    Subjective:  Interval History:He complains of pain.    Objective:    Scheduled Meds:Cariprazine HCl, 4.5 mg, Oral, Q PM  finasteride, 5 mg, Oral, Daily  insulin lispro, 0-7 Units, Subcutaneous, TID AC  melatonin, 5 mg, Oral, Nightly  pioglitazone, 30 mg, Oral, Daily  sodium chloride, 3 mL, Intravenous, Q12H      Continuous Infusions:sodium chloride, 100 mL/hr, Last Rate: 100 mL/hr (10/03/22 0824)        Vital signs in last 24 hours:  Temp:  [97.7 °F (36.5 °C)-98.3 °F (36.8 °C)] 97.7 °F (36.5 °C)  Heart Rate:  [80-98] 98  Resp:  [16-18] 18  BP: (108-155)/(63-87) 155/87    Intake/Output:    Intake/Output Summary (Last 24 hours) at 10/3/2022 1541  Last data filed at 10/3/2022 0840  Gross per 24 hour   Intake 2396 ml   Output 550 ml   Net 1846 ml       Exam:  /87 (BP Location: Left arm)   Pulse 98   Temp 97.7 °F (36.5 °C) (Oral)   Resp 18   Ht 175.3 cm (69.02\")   Wt 100 kg (220 lb 7.4 oz)   SpO2 98%   BMI 32.54 kg/m²     General Appearance:    Alert, cooperative, no distress   Head:    Normocephalic, without obvious abnormality, atraumatic   Eyes:       Throat:   Lips, tongue, gums normal   Neck:   Supple, symmetrical, trachea midline, no JVD   Lungs:     Clear to auscultation bilaterally, respirations unlabored   Chest Wall:    No tenderness or deformity    Heart:    Regular rate and rhythm, S1 and S2 normal, no murmur,no  Rub or gallop   Abdomen:     Soft, nontender, bowel sounds active, no masses, no organomegaly    Extremities:   Extremities normal, atraumatic, no cyanosis or edema   Pulses:      Skin:   Skin is warm and dry,  no rashes or palpable lesions   Neurologic:   no focal deficits noted      Lab Results (last 72 hours)     Procedure Component Value Units Date/Time    Basic Metabolic " Panel [[id number]]  (Abnormal) Collected: 10/02/22 0526    Specimen: Blood Updated: 10/02/22 0610     Glucose 164 mg/dL      BUN 21 mg/dL      Creatinine 1.64 mg/dL      Sodium 133 mmol/L      Potassium 4.8 mmol/L      Chloride 104 mmol/L      CO2 21.0 mmol/L      Calcium 8.5 mg/dL      BUN/Creatinine Ratio 12.8     Anion Gap 8.0 mmol/L      eGFR 41.8 mL/min/1.73      Comment: National Kidney Foundation and American Society of Nephrology (ASN) Task Force recommended calculation based on the Chronic Kidney Disease Epidemiology Collaboration (CKD-EPI) equation refit without adjustment for race.       Narrative:      GFR Normal >60  Chronic Kidney Disease <60  Kidney Failure <15      CBC (No Diff) [[id number]]  (Abnormal) Collected: 10/02/22 0526    Specimen: Blood Updated: 10/02/22 0553     WBC 7.67 10*3/mm3      RBC 2.97 10*6/mm3      Hemoglobin 10.6 g/dL      Hematocrit 31.1 %      .7 fL      MCH 35.7 pg      MCHC 34.1 g/dL      RDW 13.8 %      RDW-SD 52.9 fl      MPV 9.5 fL      Platelets 70 10*3/mm3     POC Glucose Once [[id number]]  (Abnormal) Collected: 10/01/22 2103    Specimen: Blood Updated: 10/01/22 2105     Glucose 184 mg/dL      Comment: Meter: [id number] : [id number] Diallojuan Simmons JASON       Hemoglobin A1c [[id number]]  (Normal) Collected: 10/01/22 0729    Specimen: Blood Updated: 10/01/22 2023     Hemoglobin A1C 5.10 %     Narrative:      Hemoglobin A1C Ranges:    Increased Risk for Diabetes  5.7% to 6.4%  Diabetes                     >= 6.5%  Diabetic Goal                < 7.0%    POC Glucose Once [[id number]]  (Abnormal) Collected: 10/01/22 1649    Specimen: Blood Updated: 10/01/22 1651     Glucose 284 mg/dL      Comment: Meter: [id number] : [id number] Josemanuel GOMEZ       CBC (No Diff) [[id number]]  (Abnormal) Collected: 10/01/22 0729    Specimen: Blood Updated: 10/01/22 0845     WBC 8.07 10*3/mm3      RBC 3.32 10*6/mm3      Hemoglobin 11.9 g/dL      Hematocrit 35.0 %      .4 fL       MCH 35.8 pg      MCHC 34.0 g/dL      RDW 13.7 %      RDW-SD 53.2 fl      MPV 10.1 fL      Platelets 83 10*3/mm3     Basic Metabolic Panel [[id number]]  (Abnormal) Collected: 10/01/22 0729    Specimen: Blood Updated: 10/01/22 0808     Glucose 221 mg/dL      BUN 17 mg/dL      Creatinine 1.53 mg/dL      Sodium 131 mmol/L      Potassium 4.8 mmol/L      Chloride 97 mmol/L      CO2 19.8 mmol/L      Calcium 8.6 mg/dL      BUN/Creatinine Ratio 11.1     Anion Gap 14.2 mmol/L      eGFR 45.4 mL/min/1.73      Comment: National Kidney Foundation and American Society of Nephrology (ASN) Task Force recommended calculation based on the Chronic Kidney Disease Epidemiology Collaboration (CKD-EPI) equation refit without adjustment for race.       Narrative:      GFR Normal >60  Chronic Kidney Disease <60  Kidney Failure <15      Tissue Pathology Exam [[id number]] Collected: 09/30/22 1741    Specimen: Tissue from Urinary Bladder Updated: 09/30/22 2215    POC Glucose Once [[id number]]  (Normal) Collected: 09/30/22 1841    Specimen: Blood Updated: 09/30/22 1843     Glucose 99 mg/dL      Comment: Meter: [id number] : [id number] Ann Augustin RN       POC Glucose Once [[id number]]  (Normal) Collected: 09/30/22 1334    Specimen: Blood Updated: 09/30/22 1407     Glucose 117 mg/dL      Comment: Meter: [id number] : [id number] Richar GOMEZ           Data Review:  Results from last 7 days   Lab Units 10/03/22  0707 10/02/22  0526 10/01/22  0729   SODIUM mmol/L 131* 133* 131*   POTASSIUM mmol/L 4.6 4.8 4.8   CHLORIDE mmol/L 104 104 97*   CO2 mmol/L 21.3* 21.0* 19.8*   BUN mg/dL 23 21 17   CREATININE mg/dL 1.68* 1.64* 1.53*   GLUCOSE mg/dL 147* 164* 221*   CALCIUM mg/dL 8.4* 8.5* 8.6     Results from last 7 days   Lab Units 10/03/22  0707 10/02/22  0526 10/01/22  0729   WBC 10*3/mm3 6.69 7.67 8.07   HEMOGLOBIN g/dL 9.8* 10.6* 11.9*   HEMATOCRIT % 28.4* 31.1* 35.0*   PLATELETS 10*3/mm3 69* 70* 83*         Results from last 7  days   Lab Units 10/01/22  0729   HEMOGLOBIN A1C % 5.10     Lab Results   Lab Value Date/Time    TROPONINT <0.010 08/01/2022 1607    TROPONINT <0.010 09/28/2021 1400    TROPONINT <0.01 01/19/2021 1303               Invalid input(s): PROT, LABALBU      Results from last 7 days   Lab Units 10/01/22  0729   HEMOGLOBIN A1C % 5.10     Glucose   Date/Time Value Ref Range Status   10/03/2022 1202 177 (H) 70 - 130 mg/dL Final     Comment:     Meter: [id number] : [id number] Cathryn Fung CNA   10/03/2022 0754 159 (H) 70 - 130 mg/dL Final     Comment:     Meter: [id number] : [id number] Cathryn Fung CNA   10/02/2022 2025 218 (H) 70 - 130 mg/dL Final     Comment:     Meter: [id number] : [id number] Conrad Fitch NA   10/02/2022 1700 147 (H) 70 - 130 mg/dL Final     Comment:     Meter: [id number] : [id number] Mahesh Angela NA   10/02/2022 1202 164 (H) 70 - 130 mg/dL Final     Comment:     Meter: [id number] : [id number] Mahesh Angela NA   10/01/2022 2103 184 (H) 70 - 130 mg/dL Final     Comment:     Meter: [id number] : [id number] Anthony Desiraealeksey NA   10/01/2022 1649 284 (H) 70 - 130 mg/dL Final     Comment:     Meter: [id number] : [id number] Josemanuel El NA   09/30/2022 1841 99 70 - 130 mg/dL Final     Comment:     Meter: [id number] : [id number] Ann Augustin RN           Past Medical History:   Diagnosis Date   • Aneurysm of aorta (HCC)    • Anxiety and depression    • Arthritis    • BPH (benign prostatic hyperplasia)    • Chronic UTI    • Cirrhosis (HCC)    • Diabetes mellitus (HCC)    • H/O esophageal varices    • Hard of hearing    • History of frequent urinary tract infections    • History of MI (myocardial infarction)     STATES WAS TOLD HE HAD IN PAST, NEVER SAW CARDIOLOGY   • Hypertension    • Pulmonary nodules    • Self-catheterizes urinary bladder        Assessment:  Active Hospital Problems    Diagnosis  POA   • **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       Plan:  Continue with current RX. Follow lab. Glycemic control.    Austyn Velazco MD  10/3/2022  15:41 EDT

## 2022-10-03 NOTE — PROGRESS NOTES
"  First Urology Progress Note    Chief Complaint: Constipation.  No bowel movement yet.    He is passing a small amount of gas.  Concerned about his bowels but he is feeling better.  Pain is pretty minimal.  He has no flank pain.  Urine output has been excellent his superior catheter is draining well.  Some bloody urine but no clots.  His creatinine is bumped up still little bit more and a CAT scan has been ordered but still not done.    Review of Systems:    The following systems were reviewed and negative;  respiratory and cardiovascular          Vital Signs  /70 (BP Location: Right arm, Patient Position: Lying)   Pulse 97   Temp 98.4 °F (36.9 °C) (Oral)   Resp 18   Ht 175.3 cm (69.02\")   Wt 100 kg (220 lb 7.4 oz)   SpO2 97%   BMI 32.54 kg/m²     Physical Exam:     General Appearance:    Alert, cooperative, NAD   HEENT:    No trauma, pupils reactive, hearing intact   Back:     No CVA tenderness   Lungs:     Respirations unlabored, no wheezing    Heart:    RRR, intact peripheral pulses   Abdomen:    Protuberant and slightly distended.  Wound clean dry and intact.  Suprapubic catheter draining well no cellulitis.   :   Penis normal testes normal   Extremities:   No edema, no deformity   Lymphatic:   No neck or groin LAD   Skin:   No bleeding, bruising or rashes   Neuro/Psych:   Orientation intact, mood/affect pleasant, no focal findings        Results Review:     I reviewed the patient's new clinical results.  Lab Results (last 24 hours)     Procedure Component Value Units Date/Time    POC Glucose Once [[id number]]  (Abnormal) Collected: 10/03/22 1629    Specimen: Blood Updated: 10/03/22 1641     Glucose 207 mg/dL      Comment: Meter: [id number] : [id number] Cathryn Fung CNA       Tissue Pathology Exam [[id number]] Collected: 09/30/22 1741    Specimen: Tissue from Urinary Bladder Updated: 10/03/22 1425     Case Report --     Surgical Pathology Report                         Case: [id number]          " "                        Authorizing Provider:  Art Dickerson MD   Collected:           09/30/2022 05:41 PM          Ordering Location:     [facility]  Received:            09/30/2022 10:15 PM                                 MAIN OR                                                                      Pathologist:           Hailey Chavez MD                                                    Specimen:    Urinary Bladder, BLADDER DIVERTICULUM                                                       Final Diagnosis --     1. Bladder, Excision:   A. Chronically inflamed and congested bladder diverticulum.   B. No evidence of high grade dysplasia nor malignancy identified.    swm/jse        Gross Description --     1. Urinary Bladder.  Received in formalin labeled \"bladder diverticulum\" is a 6.5 x 3.5 x 3.5 cm portion of bladder that is received open.  There is a minimal amount of overlying perivesicular fat.  The overlying fibroadipose tissue is inked black.  The mucosa is red-brown and somewhat granular.  There are no mucosal masses grossly identified.  The bladder wall averages 0.4 cm in thickness.  Representative sections are submitted as follows:    1A - surgical margin submitted en fat  1B-1D - random bladder mucosa to include inked overlying perivesicular fat    Ks/uso/swm/kds      POC Glucose Once [[id number]]  (Abnormal) Collected: 10/03/22 1202    Specimen: Blood Updated: 10/03/22 1204     Glucose 177 mg/dL      Comment: Meter: [id number] : [id number] Cathryn Fung CNA       Basic Metabolic Panel [[id number]]  (Abnormal) Collected: 10/03/22 0707    Specimen: Blood Updated: 10/03/22 0810     Glucose 147 mg/dL      BUN 23 mg/dL      Creatinine 1.68 mg/dL      Sodium 131 mmol/L      Potassium 4.6 mmol/L      Chloride 104 mmol/L      CO2 21.3 mmol/L      Calcium 8.4 mg/dL      BUN/Creatinine Ratio 13.7     Anion Gap 5.7 mmol/L      eGFR 40.6 mL/min/1.73      Comment: National Kidney " Foundation and American Society of Nephrology (ASN) Task Force recommended calculation based on the Chronic Kidney Disease Epidemiology Collaboration (CKD-EPI) equation refit without adjustment for race.       Narrative:      GFR Normal >60  Chronic Kidney Disease <60  Kidney Failure <15      CBC (No Diff) [[id number]]  (Abnormal) Collected: 10/03/22 0707    Specimen: Blood Updated: 10/03/22 0809     WBC 6.69 10*3/mm3      RBC 2.72 10*6/mm3      Hemoglobin 9.8 g/dL      Hematocrit 28.4 %      .4 fL      MCH 36.0 pg      MCHC 34.5 g/dL      RDW 13.7 %      RDW-SD 52.2 fl      MPV 9.8 fL      Platelets 69 10*3/mm3     POC Glucose Once [[id number]]  (Abnormal) Collected: 10/03/22 0754    Specimen: Blood Updated: 10/03/22 0756     Glucose 159 mg/dL      Comment: Meter: [id number] : [id number] Cathryn Fung CNA       POC Glucose Once [[id number]]  (Abnormal) Collected: 10/02/22 2025    Specimen: Blood Updated: 10/02/22 2026     Glucose 218 mg/dL      Comment: Meter: [id number] : [id number] Mart[facility] NA           Imaging Results (Last 24 Hours)     ** No results found for the last 24 hours. **          Medication Review:   I have personally reviewed    Current Facility-Administered Medications:   •  Cariprazine HCl capsule 4.5 mg, 4.5 mg, Oral, Q PM, Austyn Velazco MD, 4.5 mg at 10/03/22 1805  •  dextrose (D50W) (25 g/50 mL) IV injection 25 g, 25 g, Intravenous, Q15 Min PRN, Austyn Velazco MD  •  dextrose (GLUTOSE) oral gel 15 g, 15 g, Oral, Q15 Min PRN, Austyn Velazco MD  •  finasteride (PROSCAR) tablet 5 mg, 5 mg, Oral, Daily, Austyn Velazco MD, 5 mg at 10/03/22 0825  •  glucagon (human recombinant) (GLUCAGEN DIAGNOSTIC) injection 1 mg, 1 mg, Intramuscular, Q15 Min PRN, Austyn Velazco MD  •  HYDROmorphone (DILAUDID) injection 0.5 mg, 0.5 mg, Intravenous, Q2H PRN, 0.5 mg at 09/30/22 5194 **AND** naloxone (NARCAN) injection 0.1 mg, 0.1 mg, Intravenous, Q5 Min PRN, Art Dickerson MD  •  insulin  lispro (ADMELOG) injection 0-7 Units, 0-7 Units, Subcutaneous, TID AC, Austyn Velazco MD, 3 Units at 10/03/22 1804  •  melatonin tablet 5 mg, 5 mg, Oral, Nightly, Austyn Velazco MD, 5 mg at 10/02/22 2054  •  ondansetron (ZOFRAN) tablet 4 mg, 4 mg, Oral, Q6H PRN **OR** ondansetron (ZOFRAN) injection 4 mg, 4 mg, Intravenous, Q6H PRN, Art Dickerson MD  •  oxyCODONE-acetaminophen (PERCOCET) 7.5-325 MG per tablet 1 tablet, 1 tablet, Oral, Q4H PRN, Art Dickerson MD, 1 tablet at 10/03/22 1345  •  pioglitazone (ACTOS) tablet 30 mg, 30 mg, Oral, Daily, Austyn Velazco MD, 30 mg at 10/03/22 0825  •  sodium chloride 0.9 % flush 10 mL, 10 mL, Intravenous, PRN, Art Dickerson MD  •  sodium chloride 0.9 % flush 3 mL, 3 mL, Intravenous, Q12H, Art Dickerson MD, 3 mL at 10/03/22 1347  •  sodium chloride 0.9 % infusion, 100 mL/hr, Intravenous, Continuous, Art Dickerson MD, Last Rate: 100 mL/hr at 10/03/22 1805, 100 mL/hr at 10/03/22 1805    Allergies:    Morphine and Penicillins    Assessment:    Active Problems:  Patient Active Problem List   Diagnosis   • BPH with obstruction/lower urinary tract symptoms   • Essential hypertension   • Type 2 diabetes mellitus without complication (HCC)   • Thrombocytopenia (HCC)   • B12 deficiency   • Syncope and collapse   • Altered mental status, unspecified altered mental status type   • Cytokine release syndrome, grade 1   • Acquired bladder diverticulum   • Surgery, elective   • History of MI (myocardial infarction)   • Cirrhosis (HCC)       Postop day 3 partial cystectomy for bladder diverticulum with insertion of suprapubic cystotomy tube  Elevated creatinine with concern for possible obstructed ureter on the left side which was adjacent to his diverticulum.  No clear indication that he has a urinoma developing.  Creatinine should be much higher.    Plan:    Await his CAT scan.  He may need a nephrostomy tube if significant hydro.      Art Suarez  MD Tuan    10/3/2022  19:13 EDT

## 2022-10-04 ENCOUNTER — APPOINTMENT (OUTPATIENT)
Dept: CT IMAGING | Facility: HOSPITAL | Age: 82
End: 2022-10-04

## 2022-10-04 LAB
ANION GAP SERPL CALCULATED.3IONS-SCNC: 11.3 MMOL/L (ref 5–15)
BUN SERPL-MCNC: 22 MG/DL (ref 8–23)
BUN/CREAT SERPL: 14.2 (ref 7–25)
CALCIUM SPEC-SCNC: 8.5 MG/DL (ref 8.6–10.5)
CHLORIDE SERPL-SCNC: 102 MMOL/L (ref 98–107)
CO2 SERPL-SCNC: 18.7 MMOL/L (ref 22–29)
CREAT SERPL-MCNC: 1.55 MG/DL (ref 0.76–1.27)
DEPRECATED RDW RBC AUTO: 52.6 FL (ref 37–54)
EGFRCR SERPLBLD CKD-EPI 2021: 44.7 ML/MIN/1.73
ERYTHROCYTE [DISTWIDTH] IN BLOOD BY AUTOMATED COUNT: 13.8 % (ref 12.3–15.4)
GLUCOSE BLDC GLUCOMTR-MCNC: 137 MG/DL (ref 70–130)
GLUCOSE BLDC GLUCOMTR-MCNC: 186 MG/DL (ref 70–130)
GLUCOSE BLDC GLUCOMTR-MCNC: 197 MG/DL (ref 70–130)
GLUCOSE SERPL-MCNC: 134 MG/DL (ref 65–99)
HCT VFR BLD AUTO: 31.8 % (ref 37.5–51)
HGB BLD-MCNC: 11 G/DL (ref 13–17.7)
MCH RBC QN AUTO: 35.9 PG (ref 26.6–33)
MCHC RBC AUTO-ENTMCNC: 34.6 G/DL (ref 31.5–35.7)
MCV RBC AUTO: 103.9 FL (ref 79–97)
PLATELET # BLD AUTO: 84 10*3/MM3 (ref 140–450)
PMV BLD AUTO: 10.1 FL (ref 6–12)
POTASSIUM SERPL-SCNC: 4.3 MMOL/L (ref 3.5–5.2)
RBC # BLD AUTO: 3.06 10*6/MM3 (ref 4.14–5.8)
SODIUM SERPL-SCNC: 132 MMOL/L (ref 136–145)
WBC NRBC COR # BLD: 7.31 10*3/MM3 (ref 3.4–10.8)

## 2022-10-04 PROCEDURE — 97530 THERAPEUTIC ACTIVITIES: CPT

## 2022-10-04 PROCEDURE — 82962 GLUCOSE BLOOD TEST: CPT

## 2022-10-04 PROCEDURE — 63710000001 INSULIN LISPRO (HUMAN) PER 5 UNITS: Performed by: HOSPITALIST

## 2022-10-04 PROCEDURE — 25010000002 HYDROMORPHONE PER 4 MG: Performed by: UROLOGY

## 2022-10-04 PROCEDURE — 85027 COMPLETE CBC AUTOMATED: CPT | Performed by: UROLOGY

## 2022-10-04 PROCEDURE — 74176 CT ABD & PELVIS W/O CONTRAST: CPT

## 2022-10-04 PROCEDURE — 97116 GAIT TRAINING THERAPY: CPT

## 2022-10-04 PROCEDURE — 97162 PT EVAL MOD COMPLEX 30 MIN: CPT

## 2022-10-04 PROCEDURE — 74177 CT ABD & PELVIS W/CONTRAST: CPT

## 2022-10-04 PROCEDURE — 25010000002 IOPAMIDOL 61 % SOLUTION: Performed by: UROLOGY

## 2022-10-04 PROCEDURE — 80048 BASIC METABOLIC PNL TOTAL CA: CPT | Performed by: UROLOGY

## 2022-10-04 RX ADMIN — OXYCODONE HYDROCHLORIDE AND ACETAMINOPHEN 1 TABLET: 7.5; 325 TABLET ORAL at 03:46

## 2022-10-04 RX ADMIN — INSULIN LISPRO 2 UNITS: 100 INJECTION, SOLUTION INTRAVENOUS; SUBCUTANEOUS at 12:59

## 2022-10-04 RX ADMIN — FINASTERIDE 5 MG: 5 TABLET, FILM COATED ORAL at 10:15

## 2022-10-04 RX ADMIN — Medication 3 ML: at 11:02

## 2022-10-04 RX ADMIN — SODIUM CHLORIDE 100 ML/HR: 9 INJECTION, SOLUTION INTRAVENOUS at 17:51

## 2022-10-04 RX ADMIN — Medication 5 MG: at 20:31

## 2022-10-04 RX ADMIN — HYDROMORPHONE HYDROCHLORIDE 0.5 MG: 1 INJECTION, SOLUTION INTRAMUSCULAR; INTRAVENOUS; SUBCUTANEOUS at 14:43

## 2022-10-04 RX ADMIN — CARIPRAZINE 4.5 MG: 4.5 CAPSULE, GELATIN COATED ORAL at 17:33

## 2022-10-04 RX ADMIN — OXYCODONE HYDROCHLORIDE AND ACETAMINOPHEN 1 TABLET: 7.5; 325 TABLET ORAL at 10:15

## 2022-10-04 RX ADMIN — INSULIN LISPRO 2 UNITS: 100 INJECTION, SOLUTION INTRAVENOUS; SUBCUTANEOUS at 17:33

## 2022-10-04 RX ADMIN — Medication 3 ML: at 20:31

## 2022-10-04 RX ADMIN — IOPAMIDOL 100 ML: 612 INJECTION, SOLUTION INTRAVENOUS at 10:38

## 2022-10-04 RX ADMIN — OXYCODONE HYDROCHLORIDE AND ACETAMINOPHEN 1 TABLET: 7.5; 325 TABLET ORAL at 20:31

## 2022-10-04 RX ADMIN — PIOGLITAZONE HYDROCHLORIDE 30 MG: 30 TABLET ORAL at 10:15

## 2022-10-04 NOTE — THERAPY EVALUATION
Patient Name: Teto Castle  : 1940    MRN: [id number]                              Today's Date: 10/4/2022       Admit Date: 2022    Visit Dx:     ICD-10-CM ICD-9-CM   1. Bladder diverticulum  N32.3 596.3     Patient Active Problem List   Diagnosis   • BPH with obstruction/lower urinary tract symptoms   • Essential hypertension   • Type 2 diabetes mellitus without complication (HCC)   • Thrombocytopenia (HCC)   • B12 deficiency   • Syncope and collapse   • Altered mental status, unspecified altered mental status type   • Cytokine release syndrome, grade 1   • Acquired bladder diverticulum   • Surgery, elective   • History of MI (myocardial infarction)   • Cirrhosis (HCC)     Past Medical History:   Diagnosis Date   • Aneurysm of aorta (HCC)    • Anxiety and depression    • Arthritis    • BPH (benign prostatic hyperplasia)    • Chronic UTI    • Cirrhosis (HCC)    • Diabetes mellitus (HCC)    • H/O esophageal varices    • Hard of hearing    • History of frequent urinary tract infections    • History of MI (myocardial infarction)     STATES WAS TOLD HE HAD IN PAST, NEVER SAW CARDIOLOGY   • Hypertension    • Pulmonary nodules    • Self-catheterizes urinary bladder      Past Surgical History:   Procedure Laterality Date   • CATARACT EXTRACTION, BILATERAL     • COLONOSCOPY  approx     normal per patient   • CYSTECTOMY N/A 2022    Procedure: Bladder Diverticulectomy;  Surgeon: Art Dickerson MD;  Location: [facility];  Service: Urology;  Laterality: N/A;   • CYSTOSCOPY TRANSURETHRAL RESECTION OF PROSTATE N/A 9/10/2018    Procedure: TRANSURETHRAL RESECTION OF PROSTATE;  Surgeon: Art Dickerson MD;  Location: [facility];  Service: Urology   • ENDOSCOPIC FUNCTIONAL SINUS SURGERY (FESS) N/A 5/15/2019    Procedure: ETHMOIDECTOMY,LEFT INTERNASAL  ANTROSTOMY;  Surgeon: Mark Linda MD;  Location: [facility];  Service: ENT   • FRACTURE SURGERY      LT ARM   •  HEMORRHOIDECTOMY     • SEPTOPLASTY N/A 5/15/2019    Procedure: NASAL SEPTOPLASTY,;  Surgeon: Mark Linda MD;  Location:  [facility];  Service: ENT   • SUPRAPUBIC TUBE PLACEMENT N/A 9/30/2022    Procedure: SUPRAPUBIC CATHETER INSERTION;  Surgeon: Art Dickerson MD;  Location: [facility] MAIN OR;  Service: Urology;  Laterality: N/A;      General Information     Row Name 10/04/22 1533          Physical Therapy Time and Intention    Document Type evaluation  -MG     Mode of Treatment physical therapy;individual therapy  -MG     Row Name 10/04/22 1533          General Information    Patient Profile Reviewed yes  -MG     Prior Level of Function min assist:;ADL's  Pt stating needing help w/ ADLs, can get in/out of bed on his own and ambulates with a RW or cane.  -MG     Existing Precautions/Restrictions fall  -MG     Barriers to Rehab hearing deficit  [place]  -MG     Row Name 10/04/22 1533          Living Environment    People in Home spouse  Lives in 1st floor apt. States going to [facility] that dtr owns after this hospital stay for 1-2 weeks then going back to his apt. 0 steps at [facility]; he had been there before.  -MG     Row Name 10/04/22 1533          Home Main Entrance    Number of Stairs, Main Entrance none  -MG     Row Name 10/04/22 1533          Stairs Within Home, Primary    Number of Stairs, Within Home, Primary none  -MG     Row Name 10/04/22 1533          Cognition    Orientation Status (Cognition) oriented x 3  -MG     Row Name 10/04/22 1533          Safety Issues, Functional Mobility    Safety Issues Affecting Function (Mobility) insight into deficits/self-awareness;safety precaution awareness  -MG     Impairments Affecting Function (Mobility) balance;strength;pain;shortness of breath  -MG     Comment, Safety Issues/Impairments (Mobility) Non-skid socks and gait belt donned.  -MG           User Key  (r) = Recorded By, (t) = Taken By, (c) = Cosigned By    Initials Name Provider Type    MG Ramya De La Rosa, PT  Physical Therapist               Mobility     Row Name 10/04/22 1536          Bed Mobility    Bed Mobility supine-sit;sit-supine  -MG     Supine-Sit [place] (Bed Mobility) standby assist  -MG     Sit-Supine [place] (Bed Mobility) standby assist  -MG     Assistive Device (Bed Mobility) bed rails;head of bed elevated  -MG     Comment, (Bed Mobility) Good sitting balance.  -MG     Row Name 10/04/22 1536          Bed-Chair Transfer    Bed-Chair [place] (Transfers) not tested  -MG     Row Name 10/04/22 1536          Sit-Stand Transfer    Sit-Stand [place] (Transfers) contact guard;verbal cues  -MG     Assistive Device (Sit-Stand Transfers) other (see comments)  IV pole  -MG     Comment, (Sit-Stand Transfer) Pt pushed up from bed w/ BUEs then held onto IV pole prior to amb. No balance deficits noted.  -MG     Row Name 10/04/22 1536          Gait/Stairs (Locomotion)    [place] Level (Gait) contact guard;verbal cues  -MG     Assistive Device (Gait) other (see comments)  IV pole  -MG     Distance in Feet (Gait) 400  -MG     [place] Level (Stairs) not tested  -MG     Comment, (Gait/Stairs) Pt amb w/ RUE on IV pole. Cued throughout to slow down as pt would become unsteady. No overt LOB noted. Pt breathing heavily on return to room, but denied SOB. SpO2 at 98%.  -MG           User Key  (r) = Recorded By, (t) = Taken By, (c) = Cosigned By    Initials Name Provider Type    MG Ramya De La Rosa, PT Physical Therapist               Obj/Interventions     Row Name 10/04/22 1541          Range of Motion Comprehensive    General Range of Motion bilateral lower extremity ROM WFL  -MG     Row Name 10/04/22 1541          Strength Comprehensive (MMT)    General Manual Muscle Testing (MMT) Assessment lower extremity strength deficits identified  -MG     Comment, General Manual Muscle Testing (MMT) Assessment Grossly 4/5  -MG     Row Name 10/04/22 1541          Balance    Balance Assessment sitting static  balance;sitting dynamic balance;standing static balance;standing dynamic balance  -MG     Static Sitting Balance standby assist  -MG     Dynamic Sitting Balance standby assist  -MG     Position, Sitting Balance unsupported;sitting edge of bed  -MG     Static Standing Balance contact guard  -MG     Dynamic Standing Balance contact guard;minimal assist  -MG     Position/Device Used, Standing Balance supported;other (see comments)  IV pole  -MG     Comment, Balance Pt unsteady when increasing gait speed. Cued to slow down. Intermittent moments with increased speed requiring Hallie to maintain balance.  -MG     Row Name 10/04/22 1541          Sensory Assessment (Somatosensory)    Sensory Assessment (Somatosensory) LE sensation intact  Denies N/T  -MG           User Key  (r) = Recorded By, (t) = Taken By, (c) = Cosigned By    Initials Name Provider Type    MG Ramya De La Rosa, PT Physical Therapist               Goals/Plan     Row Name 10/04/22 1186          Bed Mobility Goal 1 (PT)    Activity/Assistive Device (Bed Mobility Goal 1, PT) bed mobility activities, all  -MG     [place] Level/Cues Needed (Bed Mobility Goal 1, PT) independent  -MG     Time Frame (Bed Mobility Goal 1, PT) 1 week  -MG     Row Name 10/04/22 9619          Transfer Goal 1 (PT)    Activity/Assistive Device (Transfer Goal 1, PT) transfers, all  -MG     [place] Level/Cues Needed (Transfer Goal 1, PT) modified independence  -MG     Time Frame (Transfer Goal 1, PT) 1 week  -MG     Row Name 10/04/22 5185          Gait Training Goal 1 (PT)    Activity/Assistive Device (Gait Training Goal 1, PT) gait (walking locomotion)  -MG     [place] Level (Gait Training Goal 1, PT) modified independence  -MG     Time Frame (Gait Training Goal 1, PT) 1 week  -MG     Row Name 10/04/22 1743          Therapy Assessment/Plan (PT)    Planned Therapy Interventions (PT) balance training;postural re-education;transfer training;bed mobility training;gait  training;home exercise program;patient/family education;stretching;strengthening;neuromuscular re-education;ROM (range of motion)  -MG           User Key  (r) = Recorded By, (t) = Taken By, (c) = Cosigned By    Initials Name Provider Type    Ramya Melgar, PT Physical Therapist               Clinical Impression     Row Name 10/04/22 1543          Pain    Pretreatment Pain Rating 4/10  -MG     Posttreatment Pain Rating 4/10  -MG     Row Name 10/04/22 1543          Plan of Care Review    Outcome Evaluation Pt is an 83y/o M s/p 9/30 bladder diverticulectomy and suprapubic catheter insertion. PMHx: HTN, DMII, cirrhosis and MI. Pt states requiring help w/ ADLs, but can get in/out of bed and ambulate on his own with RW or cane. Pt currently SBA for bed mobility, CGA for transfers and CGA for ambulation. Pt amb 400' w/ use of IV pole and CGA. Pt cued throughout to slow down as he would become unsteady when increasing his gait speed, however no LOB noted. Pt breathing heavily on return to room, denied SOB and SpO2 stating 98% on RA. Pt fatigued on return to room, requesting to return to bed as he has had a busy/active morning and would like to take a nap. Pt encouraged to cont ambulating w/ nsg staff and sitting UIC throughout the day. Strength, balance and slight endurance deficits noted. Pt would benefit from one additional skilled PT session for balance and LE strengthening. Notes stating pt requesting SNF upon DC, however pt stated he is wanting to DC to long term that his daughter owns for 1-2 weeks and then return to his apt. Attempted to differentiate SNF vs [facility] and pt cont to state it is long term.  -MG     Row Name 10/04/22 1543          Therapy Assessment/Plan (PT)    Rehab Potential (PT) good, to achieve stated therapy goals  -MG     Criteria for Skilled Interventions Met (PT) yes;meets criteria  -MG     Therapy Frequency (PT) 5 times/wk  -MG     Row Name 10/04/22 1543          Vital Signs    Pre SpO2 (%) 100  -MG     O2  Delivery Pre Treatment room air  -MG     O2 Delivery Intra Treatment room air  -MG     Post SpO2 (%) 98  -MG     O2 Delivery Post Treatment room air  -MG     Pre Patient Position Supine  -MG     Intra Patient Position Standing  -MG     Post Patient Position Supine  -MG     Row Name 10/04/22 1544          Positioning and Restraints    Pre-Treatment Position in bed  -MG     Post Treatment Position bed  -MG     In Bed notified nsg;supine;fowlers;call light within reach;encouraged to call for assist  Exit alarm not on when entering room, left off. RN aware pt back in bed. Denied chair as he has had an active morning and wanting to rest.  -MG           User Key  (r) = Recorded By, (t) = Taken By, (c) = Cosigned By    Initials Name Provider Type    Ramya Melgar PT Physical Therapist               Outcome Measures     Row Name 10/04/22 1359          How much help from another person do you currently need...    Turning from your back to your side while in flat bed without using bedrails? 4  -MG     Moving from lying on back to sitting on the side of a flat bed without bedrails? 3  -MG     Moving to and from a bed to a chair (including a wheelchair)? 3  -MG     Standing up from a chair using your arms (e.g., wheelchair, bedside chair)? 3  -MG     Climbing 3-5 steps with a railing? 2  -MG     To walk in hospital room? 3  -MG     AM-PAC 6 Clicks Score (PT) 18  -MG     Highest level of mobility 6 --> Walked 10 steps or more  -MG     Row Name 10/04/22 4606          Functional Assessment    Outcome Measure Options AM-PAC 6 Clicks Basic Mobility (PT)  -MG           User Key  (r) = Recorded By, (t) = Taken By, (c) = Cosigned By    Initials Name Provider Type    Ramya Melgar, JOSE LUIS Physical Therapist                             Physical Therapy Education                 Title: PT OT SLP Therapies (In Progress)     Topic: Physical Therapy (In Progress)     Point: Mobility training (Done)     Learning Progress Summary            Patient Acceptance, E, VU by MG at 10/4/2022 1554                   Point: Home exercise program (Not Started)     Learner Progress:  Not documented in this visit.          Point: Body mechanics (Done)     Learning Progress Summary           Patient Acceptance, E, VU by MG at 10/4/2022 1554                   Point: Precautions (Done)     Learning Progress Summary           Patient Acceptance, E, VU by MG at 10/4/2022 1554                               User Key     Initials Effective Dates Name Provider Type Discipline     05/24/22 -  Ramya De La Rosa, PT Physical Therapist PT              PT Recommendation and Plan  Planned Therapy Interventions (PT): balance training, postural re-education, transfer training, bed mobility training, gait training, home exercise program, patient/family education, stretching, strengthening, neuromuscular re-education, ROM (range of motion)  Outcome Evaluation: Pt is an 83y/o M s/p 9/30 bladder diverticulectomy and suprapubic catheter insertion. PMHx: HTN, DMII, cirrhosis and MI. Pt states requiring help w/ ADLs, but can get in/out of bed and ambulate on his own with RW or cane. Pt currently SBA for bed mobility, CGA for transfers and CGA for ambulation. Pt amb 400' w/ use of IV pole and CGA. Pt cued throughout to slow down as he would become unsteady when increasing his gait speed, however no LOB noted. Pt breathing heavily on return to room, denied SOB and SpO2 stating 98% on RA. Pt fatigued on return to room, requesting to return to bed as he has had a busy/active morning and would like to take a nap. Pt encouraged to cont ambulating w/ nsg staff and sitting UIC throughout the day. Strength, balance and slight endurance deficits noted. Pt would benefit from one additional skilled PT session for balance and LE strengthening. Notes stating pt requesting SNF upon DC, however pt stated he is wanting to DC to [facility] that his daughter owns for 1-2 weeks and then return to his apt.  Attempted to differentiate SNF vs longterm and pt cont to state it is [facility].     Time Calculation:    PT Charges     Row Name 10/04/22 1555             Time Calculation    Start Time 1101  -MG      Stop Time 1131  -MG      Time Calculation (min) 30 min  -MG      PT Received On 10/04/22  -MG      PT - Next Appointment 10/05/22  -MG      PT Goal Re-Cert Due Date 10/11/22  -MG              Time Calculation- PT    Total Timed Code Minutes- PT 28 minute(s)  -MG            User Key  (r) = Recorded By, (t) = Taken By, (c) = Cosigned By    Initials Name Provider Type    MG Ramya De La Rosa, PT Physical Therapist              Therapy Charges for Today     Code Description Service Date Service Provider Modifiers Qty    13739129413 HC GAIT TRAINING EA 15 MIN 10/4/2022 Ramya De La Rosa, PT GP 1    14115773644 HC PT EVAL MOD COMPLEXITY 2 10/4/2022 Ramya De La Rosa, PT GP 1    14254915045 HC PT THERAPEUTIC ACT EA 15 MIN 10/4/2022 Ramya De La Rosa, PT GP 1          PT G-Codes  Outcome Measure Options: AM-PAC 6 Clicks Basic Mobility (PT)  AM-PAC 6 Clicks Score (PT): 18    Ramya De La Rosa PT  10/4/2022

## 2022-10-04 NOTE — H&P (VIEW-ONLY)
"  [facility] Progress Note    Chief Complaint: No new complaint    He looks great he is doing well.  He feels better.  No fevers or chills.  Started having drainage around his suprapubic cystotomy site today.  A CT scan of the M pelvis was obtained and this shows extravasation of urine of the distal ureter suspected injury.  Does have a developing urinoma.    Review of Systems:    The following systems were reviewed and negative;  respiratory and cardiovascular          Vital Signs  /71 (BP Location: Left arm, Patient Position: Lying)   Pulse 88   Temp 97.7 °F (36.5 °C) (Oral)   Resp 16   Ht 175.3 cm (69.02\")   Wt 100 kg (220 lb 7.4 oz)   SpO2 97%   BMI 32.54 kg/m²     Physical Exam:     General Appearance:    Alert, cooperative, NAD   HEENT:    No trauma, pupils reactive, hearing intact   Back:     No CVA tenderness   Lungs:     Respirations unlabored, no wheezing    Heart:    RRR, intact peripheral pulses   Abdomen:     Soft, wound healing well   :   Penis normal testes normal   Extremities:   No edema, no deformity   Lymphatic:   No neck or groin LAD   Skin:   No bleeding, bruising or rashes   Neuro/Psych:   Orientation intact, mood/affect pleasant, no focal findings        Results Review:     I reviewed the patient's new clinical results.  Lab Results (last 24 hours)     Procedure Component Value Units Date/Time    POC Glucose Once [[id number]]  (Abnormal) Collected: 10/04/22 1537    Specimen: Blood Updated: 10/04/22 1539     Glucose 186 mg/dL      Comment: Meter: [id number] : [id number] Yara GOMEZ       POC Glucose Once [[id number]]  (Abnormal) Collected: 10/04/22 1058    Specimen: Blood Updated: 10/04/22 1059     Glucose 197 mg/dL      Comment: Meter: [id number] : [id number]       Basic Metabolic Panel [[id number]]  (Abnormal) Collected: 10/04/22 0640    Specimen: Blood Updated: 10/04/22 0808     Glucose 134 mg/dL      BUN 22 mg/dL      Creatinine 1.55 mg/dL      Sodium 132 mmol/L  "     Potassium 4.3 mmol/L      Chloride 102 mmol/L      CO2 18.7 mmol/L      Calcium 8.5 mg/dL      BUN/Creatinine Ratio 14.2     Anion Gap 11.3 mmol/L      eGFR 44.7 mL/min/1.73      Comment: National Kidney Foundation and American Society of Nephrology (ASN) Task Force recommended calculation based on the Chronic Kidney Disease Epidemiology Collaboration (CKD-EPI) equation refit without adjustment for race.       Narrative:      GFR Normal >60  Chronic Kidney Disease <60  Kidney Failure <15      CBC (No Diff) [[id number]]  (Abnormal) Collected: 10/04/22 0640    Specimen: Blood Updated: 10/04/22 0740     WBC 7.31 10*3/mm3      RBC 3.06 10*6/mm3      Hemoglobin 11.0 g/dL      Hematocrit 31.8 %      .9 fL      MCH 35.9 pg      MCHC 34.6 g/dL      RDW 13.8 %      RDW-SD 52.6 fl      MPV 10.1 fL      Platelets 84 10*3/mm3     POC Glucose Once [[id number]]  (Abnormal) Collected: 10/04/22 0734    Specimen: Blood Updated: 10/04/22 0737     Glucose 137 mg/dL      Comment: Meter: [id number] : [id number]           Imaging Results (Last 24 Hours)     Procedure Component Value Units Date/Time    CT Abdomen Pelvis Without Contrast [[id number]] Collected: 10/04/22 0945     Updated: 10/04/22 1122    Narrative:      CT ABDOMEN AND PELVIS WITH AND WITHOUT CONTRAST     HISTORY: 82-year-old male with hydronephrosis., UTI, hematuria.     TECHNIQUE: Axial CT images of the abdomen and pelvis were obtained  without administration of intravenous contrast. The patient was not  given oral contrast. Coronal and sagittal reformats were obtained.     COMPARISON: None available.     FINDINGS: The right adrenal gland is normal. The left adrenal gland is  somewhat obscured by large collaterals. There are no renal calculi. No  hydronephrosis. The right ureter demonstrates normal caliber. There is  no left-sided hydronephrosis. There is a fluid collection seen along the  left lateral pelvic wall and extending superiorly within the left  side  of the retroperitoneum and also extending into the preperitoneal space.  This appears simple. There are a couple of foci of extraluminal air seen  within this collection on image 118. The urinary bladder contains a  suprapubic catheter in place and remains thick-walled and decompressed.     The liver is cirrhotic in morphology. The spleen is enlarged measuring  15 cm. There are large portosystemic collaterals surrounding the distal  esophagus in the perigastric region. There is a spontaneous splenorenal  shunt present. The gallbladder is distended with dependent hyperdensity  that may represent sludge and/or stones. The pancreas is normal. There  is no evidence of bowel obstruction. L4-L5 and L5-S1 degenerative disc  disease with vacuum disc phenomena is present. Diffuse anasarca is  present.       Impression:      1. No renal calculi or hydronephrosis. The patient has recently had a  bladder diverticulectomy. Simple appearing fluid is seen within the left  retroperitoneum along the left lateral pelvic wall and extending into  the preperitoneal space. A urinoma cannot be excluded and follow-up with  contrast-enhanced delayed imaging is recommended.  2. Hepatic cirrhosis with sequela of portal hypertension to include  splenomegaly and large portosystemic collaterals with a spontaneous  splenorenal shunt.     Dr. Dickerson has been paged to discuss these findings.     Images were repeated following administration of intravenous contrast in  the venous and delayed phases. The enhancement of the liver is  heterogeneous and a follow-up with nonemergent MRI of the liver should  be performed for evaluation of focal abnormalities. Both kidneys enhance  symmetrically. Mucosal hyperenhancement is demonstrated within a  decompressed and thickened urinary bladder. On the delayed images,  extravasation of contrast is demonstrated within this fluid collection.  The site of ureteric obstruction is best demonstrated on image  119  series 5. This fluid collection is thus consistent with a urinoma.     Radiation dose reduction techniques were utilized, including automated  exposure control and exposure modulation based on body size.          CT Abdomen Pelvis With Contrast [[id number]] Collected: 10/04/22 0945     Updated: 10/04/22 1122    Narrative:      CT ABDOMEN AND PELVIS WITH AND WITHOUT CONTRAST     HISTORY: 82-year-old male with hydronephrosis., UTI, hematuria.     TECHNIQUE: Axial CT images of the abdomen and pelvis were obtained  without administration of intravenous contrast. The patient was not  given oral contrast. Coronal and sagittal reformats were obtained.     COMPARISON: None available.     FINDINGS: The right adrenal gland is normal. The left adrenal gland is  somewhat obscured by large collaterals. There are no renal calculi. No  hydronephrosis. The right ureter demonstrates normal caliber. There is  no left-sided hydronephrosis. There is a fluid collection seen along the  left lateral pelvic wall and extending superiorly within the left side  of the retroperitoneum and also extending into the preperitoneal space.  This appears simple. There are a couple of foci of extraluminal air seen  within this collection on image 118. The urinary bladder contains a  suprapubic catheter in place and remains thick-walled and decompressed.     The liver is cirrhotic in morphology. The spleen is enlarged measuring  15 cm. There are large portosystemic collaterals surrounding the distal  esophagus in the perigastric region. There is a spontaneous splenorenal  shunt present. The gallbladder is distended with dependent hyperdensity  that may represent sludge and/or stones. The pancreas is normal. There  is no evidence of bowel obstruction. L4-L5 and L5-S1 degenerative disc  disease with vacuum disc phenomena is present. Diffuse anasarca is  present.       Impression:      1. No renal calculi or hydronephrosis. The patient has recently had  a  bladder diverticulectomy. Simple appearing fluid is seen within the left  retroperitoneum along the left lateral pelvic wall and extending into  the preperitoneal space. A urinoma cannot be excluded and follow-up with  contrast-enhanced delayed imaging is recommended.  2. Hepatic cirrhosis with sequela of portal hypertension to include  splenomegaly and large portosystemic collaterals with a spontaneous  splenorenal shunt.     Dr. Dickerson has been paged to discuss these findings.     Images were repeated following administration of intravenous contrast in  the venous and delayed phases. The enhancement of the liver is  heterogeneous and a follow-up with nonemergent MRI of the liver should  be performed for evaluation of focal abnormalities. Both kidneys enhance  symmetrically. Mucosal hyperenhancement is demonstrated within a  decompressed and thickened urinary bladder. On the delayed images,  extravasation of contrast is demonstrated within this fluid collection.  The site of ureteric obstruction is best demonstrated on image 119  series 5. This fluid collection is thus consistent with a urinoma.     Radiation dose reduction techniques were utilized, including automated  exposure control and exposure modulation based on body size.                Medication Review:   I have personally reviewed    Current Facility-Administered Medications:   •  Cariprazine HCl capsule 4.5 mg, 4.5 mg, Oral, Q PM, Austyn Velazco MD, 4.5 mg at 10/04/22 1733  •  dextrose (D50W) (25 g/50 mL) IV injection 25 g, 25 g, Intravenous, Q15 Min PRN, Austyn Velazco MD  •  dextrose (GLUTOSE) oral gel 15 g, 15 g, Oral, Q15 Min PRN, Austyn Velazco MD  •  finasteride (PROSCAR) tablet 5 mg, 5 mg, Oral, Daily, Austyn Velazco MD, 5 mg at 10/04/22 1015  •  glucagon (human recombinant) (GLUCAGEN DIAGNOSTIC) injection 1 mg, 1 mg, Intramuscular, Q15 Min PRN, Austyn Velazco MD  •  HYDROmorphone (DILAUDID) injection 0.5 mg, 0.5 mg, Intravenous, Q2H PRN, 0.5  mg at 10/04/22 1443 **AND** naloxone (NARCAN) injection 0.1 mg, 0.1 mg, Intravenous, Q5 Min PRN, Art Dickerson MD  •  insulin lispro (ADMELOG) injection 0-7 Units, 0-7 Units, Subcutaneous, TID AC, Austyn Velazco MD, 2 Units at 10/04/22 1733  •  melatonin tablet 5 mg, 5 mg, Oral, Nightly, Austyn Velazco MD, 5 mg at 10/03/22 2111  •  ondansetron (ZOFRAN) tablet 4 mg, 4 mg, Oral, Q6H PRN **OR** ondansetron (ZOFRAN) injection 4 mg, 4 mg, Intravenous, Q6H PRN, Art Dickerson MD  •  oxyCODONE-acetaminophen (PERCOCET) 7.5-325 MG per tablet 1 tablet, 1 tablet, Oral, Q4H PRN, Art Dickerson MD, 1 tablet at 10/04/22 1015  •  pioglitazone (ACTOS) tablet 30 mg, 30 mg, Oral, Daily, Austyn Velazco MD, 30 mg at 10/04/22 1015  •  sodium chloride 0.9 % flush 10 mL, 10 mL, Intravenous, PRN, Art Dickerson MD  •  sodium chloride 0.9 % flush 3 mL, 3 mL, Intravenous, Q12H, Art Dickerson MD, 3 mL at 10/04/22 1102  •  sodium chloride 0.9 % infusion, 100 mL/hr, Intravenous, Continuous, Art Dickerson MD, Last Rate: 100 mL/hr at 10/04/22 1751, 100 mL/hr at 10/04/22 1751    Allergies:    Morphine and Penicillins    Assessment:    Active Problems:  Patient Active Problem List   Diagnosis   • BPH with obstruction/lower urinary tract symptoms   • Essential hypertension   • Type 2 diabetes mellitus without complication (HCC)   • Thrombocytopenia (HCC)   • B12 deficiency   • Syncope and collapse   • Altered mental status, unspecified altered mental status type   • Cytokine release syndrome, grade 1   • Acquired bladder diverticulum   • Surgery, elective   • History of MI (myocardial infarction)   • Cirrhosis (HCC)       Postop day 4 partial cystectomy for acquired bladder diverticulum  Left ureteral extravasation    Plan:    Plan for IR placement of nephrostomy tube on the left tomorrow.      Art Dickerson MD    10/4/2022  18:28 EDT

## 2022-10-04 NOTE — PLAN OF CARE
Goal Outcome Evaluation: Patient continuing to progress throughout the shift. Abdominal drsg changed x1 d/t leaking. PRN pain medications administered x2. Blood sugars fairly controlled. Patient ambulated in hallway. Vitals stable.

## 2022-10-04 NOTE — PLAN OF CARE
Goal Outcome Evaluation:   Pt doing better today. Ambulated with PT and staff multiple times today. Pt is still having yellow drainage from suprapubic catheter insertion site. Dr. Dickerson aware of this and plan is for a nephrostomy to be placed in the morning. Pt is also very edematous, MD aware. VSS today. Pt and children in agreeance with POC. Pt resting in bed at this time.

## 2022-10-04 NOTE — PROGRESS NOTES
"  [facility] Progress Note    Chief Complaint: No new complaint    He looks great he is doing well.  He feels better.  No fevers or chills.  Started having drainage around his suprapubic cystotomy site today.  A CT scan of the M pelvis was obtained and this shows extravasation of urine of the distal ureter suspected injury.  Does have a developing urinoma.    Review of Systems:    The following systems were reviewed and negative;  respiratory and cardiovascular          Vital Signs  /71 (BP Location: Left arm, Patient Position: Lying)   Pulse 88   Temp 97.7 °F (36.5 °C) (Oral)   Resp 16   Ht 175.3 cm (69.02\")   Wt 100 kg (220 lb 7.4 oz)   SpO2 97%   BMI 32.54 kg/m²     Physical Exam:     General Appearance:    Alert, cooperative, NAD   HEENT:    No trauma, pupils reactive, hearing intact   Back:     No CVA tenderness   Lungs:     Respirations unlabored, no wheezing    Heart:    RRR, intact peripheral pulses   Abdomen:     Soft, wound healing well   :   Penis normal testes normal   Extremities:   No edema, no deformity   Lymphatic:   No neck or groin LAD   Skin:   No bleeding, bruising or rashes   Neuro/Psych:   Orientation intact, mood/affect pleasant, no focal findings        Results Review:     I reviewed the patient's new clinical results.  Lab Results (last 24 hours)     Procedure Component Value Units Date/Time    POC Glucose Once [[id number]]  (Abnormal) Collected: 10/04/22 1537    Specimen: Blood Updated: 10/04/22 1539     Glucose 186 mg/dL      Comment: Meter: [id number] : [id number] Yara GOMEZ       POC Glucose Once [[id number]]  (Abnormal) Collected: 10/04/22 1058    Specimen: Blood Updated: 10/04/22 1059     Glucose 197 mg/dL      Comment: Meter: [id number] : [id number]       Basic Metabolic Panel [[id number]]  (Abnormal) Collected: 10/04/22 0640    Specimen: Blood Updated: 10/04/22 0808     Glucose 134 mg/dL      BUN 22 mg/dL      Creatinine 1.55 mg/dL      Sodium 132 mmol/L  "     Potassium 4.3 mmol/L      Chloride 102 mmol/L      CO2 18.7 mmol/L      Calcium 8.5 mg/dL      BUN/Creatinine Ratio 14.2     Anion Gap 11.3 mmol/L      eGFR 44.7 mL/min/1.73      Comment: National Kidney Foundation and American Society of Nephrology (ASN) Task Force recommended calculation based on the Chronic Kidney Disease Epidemiology Collaboration (CKD-EPI) equation refit without adjustment for race.       Narrative:      GFR Normal >60  Chronic Kidney Disease <60  Kidney Failure <15      CBC (No Diff) [[id number]]  (Abnormal) Collected: 10/04/22 0640    Specimen: Blood Updated: 10/04/22 0740     WBC 7.31 10*3/mm3      RBC 3.06 10*6/mm3      Hemoglobin 11.0 g/dL      Hematocrit 31.8 %      .9 fL      MCH 35.9 pg      MCHC 34.6 g/dL      RDW 13.8 %      RDW-SD 52.6 fl      MPV 10.1 fL      Platelets 84 10*3/mm3     POC Glucose Once [[id number]]  (Abnormal) Collected: 10/04/22 0734    Specimen: Blood Updated: 10/04/22 0737     Glucose 137 mg/dL      Comment: Meter: [id number] : [id number]           Imaging Results (Last 24 Hours)     Procedure Component Value Units Date/Time    CT Abdomen Pelvis Without Contrast [[id number]] Collected: 10/04/22 0945     Updated: 10/04/22 1122    Narrative:      CT ABDOMEN AND PELVIS WITH AND WITHOUT CONTRAST     HISTORY: 82-year-old male with hydronephrosis., UTI, hematuria.     TECHNIQUE: Axial CT images of the abdomen and pelvis were obtained  without administration of intravenous contrast. The patient was not  given oral contrast. Coronal and sagittal reformats were obtained.     COMPARISON: None available.     FINDINGS: The right adrenal gland is normal. The left adrenal gland is  somewhat obscured by large collaterals. There are no renal calculi. No  hydronephrosis. The right ureter demonstrates normal caliber. There is  no left-sided hydronephrosis. There is a fluid collection seen along the  left lateral pelvic wall and extending superiorly within the left  side  of the retroperitoneum and also extending into the preperitoneal space.  This appears simple. There are a couple of foci of extraluminal air seen  within this collection on image 118. The urinary bladder contains a  suprapubic catheter in place and remains thick-walled and decompressed.     The liver is cirrhotic in morphology. The spleen is enlarged measuring  15 cm. There are large portosystemic collaterals surrounding the distal  esophagus in the perigastric region. There is a spontaneous splenorenal  shunt present. The gallbladder is distended with dependent hyperdensity  that may represent sludge and/or stones. The pancreas is normal. There  is no evidence of bowel obstruction. L4-L5 and L5-S1 degenerative disc  disease with vacuum disc phenomena is present. Diffuse anasarca is  present.       Impression:      1. No renal calculi or hydronephrosis. The patient has recently had a  bladder diverticulectomy. Simple appearing fluid is seen within the left  retroperitoneum along the left lateral pelvic wall and extending into  the preperitoneal space. A urinoma cannot be excluded and follow-up with  contrast-enhanced delayed imaging is recommended.  2. Hepatic cirrhosis with sequela of portal hypertension to include  splenomegaly and large portosystemic collaterals with a spontaneous  splenorenal shunt.     Dr. Dickerson has been paged to discuss these findings.     Images were repeated following administration of intravenous contrast in  the venous and delayed phases. The enhancement of the liver is  heterogeneous and a follow-up with nonemergent MRI of the liver should  be performed for evaluation of focal abnormalities. Both kidneys enhance  symmetrically. Mucosal hyperenhancement is demonstrated within a  decompressed and thickened urinary bladder. On the delayed images,  extravasation of contrast is demonstrated within this fluid collection.  The site of ureteric obstruction is best demonstrated on image  119  series 5. This fluid collection is thus consistent with a urinoma.     Radiation dose reduction techniques were utilized, including automated  exposure control and exposure modulation based on body size.          CT Abdomen Pelvis With Contrast [[id number]] Collected: 10/04/22 0945     Updated: 10/04/22 1122    Narrative:      CT ABDOMEN AND PELVIS WITH AND WITHOUT CONTRAST     HISTORY: 82-year-old male with hydronephrosis., UTI, hematuria.     TECHNIQUE: Axial CT images of the abdomen and pelvis were obtained  without administration of intravenous contrast. The patient was not  given oral contrast. Coronal and sagittal reformats were obtained.     COMPARISON: None available.     FINDINGS: The right adrenal gland is normal. The left adrenal gland is  somewhat obscured by large collaterals. There are no renal calculi. No  hydronephrosis. The right ureter demonstrates normal caliber. There is  no left-sided hydronephrosis. There is a fluid collection seen along the  left lateral pelvic wall and extending superiorly within the left side  of the retroperitoneum and also extending into the preperitoneal space.  This appears simple. There are a couple of foci of extraluminal air seen  within this collection on image 118. The urinary bladder contains a  suprapubic catheter in place and remains thick-walled and decompressed.     The liver is cirrhotic in morphology. The spleen is enlarged measuring  15 cm. There are large portosystemic collaterals surrounding the distal  esophagus in the perigastric region. There is a spontaneous splenorenal  shunt present. The gallbladder is distended with dependent hyperdensity  that may represent sludge and/or stones. The pancreas is normal. There  is no evidence of bowel obstruction. L4-L5 and L5-S1 degenerative disc  disease with vacuum disc phenomena is present. Diffuse anasarca is  present.       Impression:      1. No renal calculi or hydronephrosis. The patient has recently had  a  bladder diverticulectomy. Simple appearing fluid is seen within the left  retroperitoneum along the left lateral pelvic wall and extending into  the preperitoneal space. A urinoma cannot be excluded and follow-up with  contrast-enhanced delayed imaging is recommended.  2. Hepatic cirrhosis with sequela of portal hypertension to include  splenomegaly and large portosystemic collaterals with a spontaneous  splenorenal shunt.     Dr. Dickerson has been paged to discuss these findings.     Images were repeated following administration of intravenous contrast in  the venous and delayed phases. The enhancement of the liver is  heterogeneous and a follow-up with nonemergent MRI of the liver should  be performed for evaluation of focal abnormalities. Both kidneys enhance  symmetrically. Mucosal hyperenhancement is demonstrated within a  decompressed and thickened urinary bladder. On the delayed images,  extravasation of contrast is demonstrated within this fluid collection.  The site of ureteric obstruction is best demonstrated on image 119  series 5. This fluid collection is thus consistent with a urinoma.     Radiation dose reduction techniques were utilized, including automated  exposure control and exposure modulation based on body size.                Medication Review:   I have personally reviewed    Current Facility-Administered Medications:   •  Cariprazine HCl capsule 4.5 mg, 4.5 mg, Oral, Q PM, Austyn Velazco MD, 4.5 mg at 10/04/22 1733  •  dextrose (D50W) (25 g/50 mL) IV injection 25 g, 25 g, Intravenous, Q15 Min PRN, Austyn Velazco MD  •  dextrose (GLUTOSE) oral gel 15 g, 15 g, Oral, Q15 Min PRN, Austyn Velazco MD  •  finasteride (PROSCAR) tablet 5 mg, 5 mg, Oral, Daily, Austyn Velazco MD, 5 mg at 10/04/22 1015  •  glucagon (human recombinant) (GLUCAGEN DIAGNOSTIC) injection 1 mg, 1 mg, Intramuscular, Q15 Min PRN, Austyn Velazco MD  •  HYDROmorphone (DILAUDID) injection 0.5 mg, 0.5 mg, Intravenous, Q2H PRN, 0.5  mg at 10/04/22 1443 **AND** naloxone (NARCAN) injection 0.1 mg, 0.1 mg, Intravenous, Q5 Min PRN, Art Dickesron MD  •  insulin lispro (ADMELOG) injection 0-7 Units, 0-7 Units, Subcutaneous, TID AC, Austyn Velazco MD, 2 Units at 10/04/22 1733  •  melatonin tablet 5 mg, 5 mg, Oral, Nightly, Austyn Velazco MD, 5 mg at 10/03/22 2111  •  ondansetron (ZOFRAN) tablet 4 mg, 4 mg, Oral, Q6H PRN **OR** ondansetron (ZOFRAN) injection 4 mg, 4 mg, Intravenous, Q6H PRN, Art Dickerson MD  •  oxyCODONE-acetaminophen (PERCOCET) 7.5-325 MG per tablet 1 tablet, 1 tablet, Oral, Q4H PRN, Art Dickerson MD, 1 tablet at 10/04/22 1015  •  pioglitazone (ACTOS) tablet 30 mg, 30 mg, Oral, Daily, Austyn Velazco MD, 30 mg at 10/04/22 1015  •  sodium chloride 0.9 % flush 10 mL, 10 mL, Intravenous, PRN, Art Dickerson MD  •  sodium chloride 0.9 % flush 3 mL, 3 mL, Intravenous, Q12H, Art Dickerson MD, 3 mL at 10/04/22 1102  •  sodium chloride 0.9 % infusion, 100 mL/hr, Intravenous, Continuous, Art iDckerson MD, Last Rate: 100 mL/hr at 10/04/22 1751, 100 mL/hr at 10/04/22 1751    Allergies:    Morphine and Penicillins    Assessment:    Active Problems:  Patient Active Problem List   Diagnosis   • BPH with obstruction/lower urinary tract symptoms   • Essential hypertension   • Type 2 diabetes mellitus without complication (HCC)   • Thrombocytopenia (HCC)   • B12 deficiency   • Syncope and collapse   • Altered mental status, unspecified altered mental status type   • Cytokine release syndrome, grade 1   • Acquired bladder diverticulum   • Surgery, elective   • History of MI (myocardial infarction)   • Cirrhosis (HCC)       Postop day 4 partial cystectomy for acquired bladder diverticulum  Left ureteral extravasation    Plan:    Plan for IR placement of nephrostomy tube on the left tomorrow.      Art Dickerson MD    10/4/2022  18:28 EDT

## 2022-10-04 NOTE — CASE MANAGEMENT/SOCIAL WORK
Continued Stay Note  [facility]     Patient Name: Teto Castle  MRN: [id number]  Today's Date: 10/4/2022    Admit Date: 9/30/2022    Plan: [facility], [facility]   Discharge Plan     Row Name 10/04/22 1606       Plan    Plan [facility], [facility]    Patient/Family in Agreement with Plan yes    Plan Comments [facility] followed up with patient in his room this date regarding referral to [facility]. CCP clarified that patient is still wanting a referral to [facility] in [place], [place] which is an [facility] not a SNF. Patient verbalized that is where he plans to go at discharge from the hospital. CCP made outbound call to [facility] this date at 1:05pm and spoke with [place] who stated she would need a Medical Physician Form completed and signed by patient's doctor. CCP received fax from [place] with [facility] with medical physican form. CCP spoke to patient's nurse Falguni in person and asked her to fill out form and ask patient's doctor to sign/date for so CCP can send back to [facility]. CCP to follow up on form completion tomorrow and fax back to [place] with [facility] if completed.               Discharge Codes    No documentation.               Expected Discharge Date and Time     Expected Discharge Date Expected Discharge Time    Oct 5, 2022

## 2022-10-04 NOTE — PLAN OF CARE
Goal Outcome Evaluation:           Progress: improving  Outcome Evaluation: VSS, minor c/o pain treated with prn pain medications. U/O dark red, irrigated per note, twoo large clots were passed, patient states he feels relief from this and U/O cleared up to pink. Drsg chnged due to copious amounts of yellow/clear drianage. No verbal complaints at this time

## 2022-10-04 NOTE — PROGRESS NOTES
"DAILY PROGRESS NOTE  [facility]    Patient Identification:  Name: Teto Castle  Age: 82 y.o.  Sex: male  :  1940  MRN: [id number]         Primary Care Physician: Cam Dickey MD    Subjective:  Interval History:He complains of pain.    Objective:    Scheduled Meds:Cariprazine HCl, 4.5 mg, Oral, Q PM  finasteride, 5 mg, Oral, Daily  insulin lispro, 0-7 Units, Subcutaneous, TID AC  melatonin, 5 mg, Oral, Nightly  pioglitazone, 30 mg, Oral, Daily  sodium chloride, 3 mL, Intravenous, Q12H      Continuous Infusions:sodium chloride, 100 mL/hr, Last Rate: 100 mL/hr (10/03/22 1805)        Vital signs in last 24 hours:  Temp:  [97.5 °F (36.4 °C)-98.4 °F (36.9 °C)] 97.7 °F (36.5 °C)  Heart Rate:  [86-98] 88  Resp:  [16-18] 16  BP: (115-146)/(70-80) 123/71    Intake/Output:    Intake/Output Summary (Last 24 hours) at 10/4/2022 1621  Last data filed at 10/4/2022 0906  Gross per 24 hour   Intake 1160 ml   Output 1150 ml   Net 10 ml       Exam:  /71 (BP Location: Left arm, Patient Position: Lying)   Pulse 88   Temp 97.7 °F (36.5 °C) (Oral)   Resp 16   Ht 175.3 cm (69.02\")   Wt 100 kg (220 lb 7.4 oz)   SpO2 97%   BMI 32.54 kg/m²     General Appearance:    Alert, cooperative, no distress   Head:    Normocephalic, without obvious abnormality, atraumatic   Eyes:       Throat:   Lips, tongue, gums normal   Neck:   Supple, symmetrical, trachea midline, no JVD   Lungs:     Clear to auscultation bilaterally, respirations unlabored   Chest Wall:    No tenderness or deformity    Heart:    Regular rate and rhythm, S1 and S2 normal, no murmur,no  Rub or gallop   Abdomen:     Soft, nontender, bowel sounds active, no masses, no organomegaly    Extremities:   Extremities normal, atraumatic, no cyanosis or edema   Pulses:      Skin:   Skin is warm and dry,  no rashes or palpable lesions   Neurologic:   no focal deficits noted      Lab Results (last 72 hours)     Procedure Component Value Units " Date/Time    Basic Metabolic Panel [[id number]]  (Abnormal) Collected: 10/02/22 0526    Specimen: Blood Updated: 10/02/22 0610     Glucose 164 mg/dL      BUN 21 mg/dL      Creatinine 1.64 mg/dL      Sodium 133 mmol/L      Potassium 4.8 mmol/L      Chloride 104 mmol/L      CO2 21.0 mmol/L      Calcium 8.5 mg/dL      BUN/Creatinine Ratio 12.8     Anion Gap 8.0 mmol/L      eGFR 41.8 mL/min/1.73      Comment: National Kidney Foundation and American Society of Nephrology (ASN) Task Force recommended calculation based on the Chronic Kidney Disease Epidemiology Collaboration (CKD-EPI) equation refit without adjustment for race.       Narrative:      GFR Normal >60  Chronic Kidney Disease <60  Kidney Failure <15      CBC (No Diff) [[id number]]  (Abnormal) Collected: 10/02/22 0526    Specimen: Blood Updated: 10/02/22 0553     WBC 7.67 10*3/mm3      RBC 2.97 10*6/mm3      Hemoglobin 10.6 g/dL      Hematocrit 31.1 %      .7 fL      MCH 35.7 pg      MCHC 34.1 g/dL      RDW 13.8 %      RDW-SD 52.9 fl      MPV 9.5 fL      Platelets 70 10*3/mm3     POC Glucose Once [[id number]]  (Abnormal) Collected: 10/01/22 2103    Specimen: Blood Updated: 10/01/22 2105     Glucose 184 mg/dL      Comment: Meter: [id number] : [id number] Anthony Desiraealeksey GOMEZ       Hemoglobin A1c [[id number]]  (Normal) Collected: 10/01/22 0729    Specimen: Blood Updated: 10/01/22 2023     Hemoglobin A1C 5.10 %     Narrative:      Hemoglobin A1C Ranges:    Increased Risk for Diabetes  5.7% to 6.4%  Diabetes                     >= 6.5%  Diabetic Goal                < 7.0%    POC Glucose Once [[id number]]  (Abnormal) Collected: 10/01/22 1649    Specimen: Blood Updated: 10/01/22 1651     Glucose 284 mg/dL      Comment: Meter: [id number] : [id number] Josemanuel GOMEZ       CBC (No Diff) [[id number]]  (Abnormal) Collected: 10/01/22 0729    Specimen: Blood Updated: 10/01/22 0845     WBC 8.07 10*3/mm3      RBC 3.32 10*6/mm3      Hemoglobin 11.9 g/dL       Hematocrit 35.0 %      .4 fL      MCH 35.8 pg      MCHC 34.0 g/dL      RDW 13.7 %      RDW-SD 53.2 fl      MPV 10.1 fL      Platelets 83 10*3/mm3     Basic Metabolic Panel [[id number]]  (Abnormal) Collected: 10/01/22 0729    Specimen: Blood Updated: 10/01/22 0808     Glucose 221 mg/dL      BUN 17 mg/dL      Creatinine 1.53 mg/dL      Sodium 131 mmol/L      Potassium 4.8 mmol/L      Chloride 97 mmol/L      CO2 19.8 mmol/L      Calcium 8.6 mg/dL      BUN/Creatinine Ratio 11.1     Anion Gap 14.2 mmol/L      eGFR 45.4 mL/min/1.73      Comment: National Kidney Foundation and American Society of Nephrology (ASN) Task Force recommended calculation based on the Chronic Kidney Disease Epidemiology Collaboration (CKD-EPI) equation refit without adjustment for race.       Narrative:      GFR Normal >60  Chronic Kidney Disease <60  Kidney Failure <15      Tissue Pathology Exam [[id number]] Collected: 09/30/22 1741    Specimen: Tissue from Urinary Bladder Updated: 09/30/22 2215    POC Glucose Once [[id number]]  (Normal) Collected: 09/30/22 1841    Specimen: Blood Updated: 09/30/22 1843     Glucose 99 mg/dL      Comment: Meter: [id number] : [id number] Ann Augustin RN       POC Glucose Once [[id number]]  (Normal) Collected: 09/30/22 1334    Specimen: Blood Updated: 09/30/22 1407     Glucose 117 mg/dL      Comment: Meter: [id number] : [id number] Richar GOMEZ           Data Review:  Results from last 7 days   Lab Units 10/04/22  0640 10/03/22  0707 10/02/22  0526   SODIUM mmol/L 132* 131* 133*   POTASSIUM mmol/L 4.3 4.6 4.8   CHLORIDE mmol/L 102 104 104   CO2 mmol/L 18.7* 21.3* 21.0*   BUN mg/dL 22 23 21   CREATININE mg/dL 1.55* 1.68* 1.64*   GLUCOSE mg/dL 134* 147* 164*   CALCIUM mg/dL 8.5* 8.4* 8.5*     Results from last 7 days   Lab Units 10/04/22  0640 10/03/22  0707 10/02/22  0526   WBC 10*3/mm3 7.31 6.69 7.67   HEMOGLOBIN g/dL 11.0* 9.8* 10.6*   HEMATOCRIT % 31.8* 28.4* 31.1*   PLATELETS 10*3/mm3 84*  69* 70*         Results from last 7 days   Lab Units 10/01/22  0729   HEMOGLOBIN A1C % 5.10     Lab Results   Lab Value Date/Time    TROPONINT <0.010 08/01/2022 1607    TROPONINT <0.010 09/28/2021 1400    TROPONINT <0.01 01/19/2021 1303               Invalid input(s): PROT, LABALBU      Results from last 7 days   Lab Units 10/01/22  0729   HEMOGLOBIN A1C % 5.10     Glucose   Date/Time Value Ref Range Status   10/04/2022 1537 186 (H) 70 - 130 mg/dL Final     Comment:     Meter: [id number] : [id number] Yara Elam NA   10/04/2022 1058 197 (H) 70 - 130 mg/dL Final     Comment:     Meter: [id number] : [id number]   10/04/2022 0734 137 (H) 70 - 130 mg/dL Final     Comment:     Meter: [id number] : [id number]   10/03/2022 1629 207 (H) 70 - 130 mg/dL Final     Comment:     Meter: [id number] : [id number] Cathryn Fung CNA   10/03/2022 1202 177 (H) 70 - 130 mg/dL Final     Comment:     Meter: [id number] : [id number] Cathryn Fung CNA   10/03/2022 0754 159 (H) 70 - 130 mg/dL Final     Comment:     Meter: [id number] : [id number] Cathryn Fung CNA   10/02/2022 2025 218 (H) 70 - 130 mg/dL Final     Comment:     Meter: [id number] : [id number] Martines Hungglo NA   10/02/2022 1700 147 (H) 70 - 130 mg/dL Final     Comment:     Meter: [id number] : [id number] Mahesh GOMEZ           Past Medical History:   Diagnosis Date   • Aneurysm of aorta (HCC)    • Anxiety and depression    • Arthritis    • BPH (benign prostatic hyperplasia)    • Chronic UTI    • Cirrhosis (HCC)    • Diabetes mellitus (HCC)    • H/O esophageal varices    • Hard of hearing    • History of frequent urinary tract infections    • History of MI (myocardial infarction)     STATES WAS TOLD HE HAD IN PAST, NEVER SAW CARDIOLOGY   • Hypertension    • Pulmonary nodules    • Self-catheterizes urinary bladder        Assessment:  Active Hospital Problems    Diagnosis  POA   • **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       Plan:  Continue with current RX. Follow lab. Glycemic control.    Austyn Velazco MD  10/4/2022  16:21 EDT

## 2022-10-04 NOTE — PLAN OF CARE
Goal Outcome Evaluation:              Outcome Evaluation: Pt is an 83y/o M s/p 9/30 bladder diverticulectomy and suprapubic catheter insertion. PMHx: HTN, DMII, cirrhosis and MI. Pt states requiring help w/ ADLs, but can get in/out of bed and ambulate on his own with RW or cane. Pt currently SBA for bed mobility, CGA for transfers and CGA for ambulation. Pt amb 400' w/ use of IV pole and CGA. Pt cued throughout to slow down as he would become unsteady when increasing his gait speed, however no LOB noted. Pt breathing heavily on return to room, denied SOB and SpO2 stating 98% on RA. Pt fatigued on return to room, requesting to return to bed as he has had a busy/active morning and would like to take a nap. Pt encouraged to cont ambulating w/ nsg staff and sitting UIC throughout the day. Strength, balance and slight endurance deficits noted. Pt would benefit from one additional skilled PT session for balance and LE strengthening. Notes stating pt requesting SNF upon DC, however pt stated he is wanting to DC to [facility] that his daughter owns for 1-2 weeks and then return to his apt. Attempted to differentiate SNF vs residential and pt cont to state it is [facility].    Patient was intermittently wearing a face mask during this therapy encounter. Therapist used appropriate personal protective equipment including eye protection, mask, and gloves.  Mask used was standard procedure mask. Appropriate PPE was worn during the entire therapy session. Hand hygiene was completed before and after therapy session. Patient is not in enhanced droplet precautions.

## 2022-10-05 ENCOUNTER — APPOINTMENT (OUTPATIENT)
Dept: INTERVENTIONAL RADIOLOGY/VASCULAR | Facility: HOSPITAL | Age: 82
End: 2022-10-05

## 2022-10-05 LAB
ANION GAP SERPL CALCULATED.3IONS-SCNC: 9 MMOL/L (ref 5–15)
BUN SERPL-MCNC: 20 MG/DL (ref 8–23)
BUN/CREAT SERPL: 14.3 (ref 7–25)
CALCIUM SPEC-SCNC: 8.4 MG/DL (ref 8.6–10.5)
CHLORIDE SERPL-SCNC: 102 MMOL/L (ref 98–107)
CO2 SERPL-SCNC: 21 MMOL/L (ref 22–29)
CREAT SERPL-MCNC: 1.4 MG/DL (ref 0.76–1.27)
DEPRECATED RDW RBC AUTO: 53.4 FL (ref 37–54)
EGFRCR SERPLBLD CKD-EPI 2021: 50.5 ML/MIN/1.73
ERYTHROCYTE [DISTWIDTH] IN BLOOD BY AUTOMATED COUNT: 13.9 % (ref 12.3–15.4)
GLUCOSE BLDC GLUCOMTR-MCNC: 127 MG/DL (ref 70–130)
GLUCOSE BLDC GLUCOMTR-MCNC: 133 MG/DL (ref 70–130)
GLUCOSE BLDC GLUCOMTR-MCNC: 155 MG/DL (ref 70–130)
GLUCOSE SERPL-MCNC: 136 MG/DL (ref 65–99)
HCT VFR BLD AUTO: 29.5 % (ref 37.5–51)
HGB BLD-MCNC: 10.2 G/DL (ref 13–17.7)
MCH RBC QN AUTO: 35.8 PG (ref 26.6–33)
MCHC RBC AUTO-ENTMCNC: 34.6 G/DL (ref 31.5–35.7)
MCV RBC AUTO: 103.5 FL (ref 79–97)
PLATELET # BLD AUTO: 98 10*3/MM3 (ref 140–450)
PMV BLD AUTO: 9.4 FL (ref 6–12)
POTASSIUM SERPL-SCNC: 4 MMOL/L (ref 3.5–5.2)
RBC # BLD AUTO: 2.85 10*6/MM3 (ref 4.14–5.8)
SODIUM SERPL-SCNC: 132 MMOL/L (ref 136–145)
WBC NRBC COR # BLD: 6.13 10*3/MM3 (ref 3.4–10.8)

## 2022-10-05 PROCEDURE — 87070 CULTURE OTHR SPECIMN AEROBIC: CPT | Performed by: UROLOGY

## 2022-10-05 PROCEDURE — C1894 INTRO/SHEATH, NON-LASER: HCPCS

## 2022-10-05 PROCEDURE — 87205 SMEAR GRAM STAIN: CPT | Performed by: UROLOGY

## 2022-10-05 PROCEDURE — 0T143JD BYPASS LEFT KIDNEY PELVIS TO CUTANEOUS WITH SYNTHETIC SUBSTITUTE, PERCUTANEOUS APPROACH: ICD-10-PCS | Performed by: UROLOGY

## 2022-10-05 PROCEDURE — 25010000002 MIDAZOLAM PER 1 MG: Performed by: RADIOLOGY

## 2022-10-05 PROCEDURE — 80048 BASIC METABOLIC PNL TOTAL CA: CPT | Performed by: UROLOGY

## 2022-10-05 PROCEDURE — 0 IOPAMIDOL PER 1 ML: Performed by: UROLOGY

## 2022-10-05 PROCEDURE — 82962 GLUCOSE BLOOD TEST: CPT

## 2022-10-05 PROCEDURE — 25010000002 FENTANYL CITRATE (PF) 50 MCG/ML SOLUTION: Performed by: RADIOLOGY

## 2022-10-05 PROCEDURE — 25010000002 CEFTRIAXONE PER 250 MG: Performed by: RADIOLOGY

## 2022-10-05 PROCEDURE — C1769 GUIDE WIRE: HCPCS

## 2022-10-05 PROCEDURE — 25010000002 CEFTRIAXONE PER 250 MG: Performed by: UROLOGY

## 2022-10-05 PROCEDURE — 63710000001 INSULIN LISPRO (HUMAN) PER 5 UNITS: Performed by: HOSPITALIST

## 2022-10-05 PROCEDURE — 99153 MOD SED SAME PHYS/QHP EA: CPT

## 2022-10-05 PROCEDURE — 99152 MOD SED SAME PHYS/QHP 5/>YRS: CPT

## 2022-10-05 PROCEDURE — 85027 COMPLETE CBC AUTOMATED: CPT | Performed by: UROLOGY

## 2022-10-05 RX ORDER — MIDAZOLAM HYDROCHLORIDE 1 MG/ML
INJECTION INTRAMUSCULAR; INTRAVENOUS
Status: COMPLETED | OUTPATIENT
Start: 2022-10-05 | End: 2022-10-05

## 2022-10-05 RX ORDER — FENTANYL CITRATE 50 UG/ML
INJECTION, SOLUTION INTRAMUSCULAR; INTRAVENOUS
Status: COMPLETED | OUTPATIENT
Start: 2022-10-05 | End: 2022-10-05

## 2022-10-05 RX ORDER — LIDOCAINE HYDROCHLORIDE 10 MG/ML
INJECTION, SOLUTION EPIDURAL; INFILTRATION; INTRACAUDAL; PERINEURAL
Status: COMPLETED | OUTPATIENT
Start: 2022-10-05 | End: 2022-10-05

## 2022-10-05 RX ADMIN — IOPAMIDOL 18 ML: 510 INJECTION, SOLUTION INTRAVASCULAR at 16:18

## 2022-10-05 RX ADMIN — CEFTRIAXONE SODIUM 1 G: 1 INJECTION, POWDER, FOR SOLUTION INTRAMUSCULAR; INTRAVENOUS at 15:30

## 2022-10-05 RX ADMIN — SODIUM CHLORIDE 100 ML/HR: 9 INJECTION, SOLUTION INTRAVENOUS at 09:18

## 2022-10-05 RX ADMIN — INSULIN LISPRO 2 UNITS: 100 INJECTION, SOLUTION INTRAVENOUS; SUBCUTANEOUS at 17:16

## 2022-10-05 RX ADMIN — PIOGLITAZONE HYDROCHLORIDE 30 MG: 30 TABLET ORAL at 09:23

## 2022-10-05 RX ADMIN — OXYCODONE HYDROCHLORIDE AND ACETAMINOPHEN 1 TABLET: 7.5; 325 TABLET ORAL at 13:53

## 2022-10-05 RX ADMIN — OXYCODONE HYDROCHLORIDE AND ACETAMINOPHEN 1 TABLET: 7.5; 325 TABLET ORAL at 20:24

## 2022-10-05 RX ADMIN — CARIPRAZINE 4.5 MG: 4.5 CAPSULE, GELATIN COATED ORAL at 17:16

## 2022-10-05 RX ADMIN — MIDAZOLAM 1 MG: 1 INJECTION INTRAMUSCULAR; INTRAVENOUS at 15:43

## 2022-10-05 RX ADMIN — Medication 5 MG: at 20:24

## 2022-10-05 RX ADMIN — Medication 3 ML: at 09:23

## 2022-10-05 RX ADMIN — LIDOCAINE HYDROCHLORIDE 6 ML: 10 INJECTION, SOLUTION EPIDURAL; INFILTRATION; INTRACAUDAL; PERINEURAL at 16:15

## 2022-10-05 RX ADMIN — CEFTRIAXONE SODIUM 1 G: 1 INJECTION, POWDER, FOR SOLUTION INTRAMUSCULAR; INTRAVENOUS at 09:18

## 2022-10-05 RX ADMIN — OXYCODONE HYDROCHLORIDE AND ACETAMINOPHEN 1 TABLET: 7.5; 325 TABLET ORAL at 07:05

## 2022-10-05 RX ADMIN — FENTANYL CITRATE 50 MCG: 50 INJECTION INTRAMUSCULAR; INTRAVENOUS at 15:43

## 2022-10-05 RX ADMIN — Medication 3 ML: at 20:24

## 2022-10-05 RX ADMIN — FINASTERIDE 5 MG: 5 TABLET, FILM COATED ORAL at 09:23

## 2022-10-05 NOTE — PROGRESS NOTES
"DAILY PROGRESS NOTE  [facility]    Patient Identification:  Name: Teto Castle  Age: 82 y.o.  Sex: male  :  1940  MRN: [id number]         Primary Care Physician: Cam Dickey MD    Subjective:  Interval History:He complains of pain.    Objective:    Scheduled Meds:Cariprazine HCl, 4.5 mg, Oral, Q PM  cefTRIAXone, 1 g, Intravenous, Q24H  finasteride, 5 mg, Oral, Daily  insulin lispro, 0-7 Units, Subcutaneous, TID AC  melatonin, 5 mg, Oral, Nightly  pioglitazone, 30 mg, Oral, Daily  sodium chloride, 3 mL, Intravenous, Q12H      Continuous Infusions:sodium chloride, 100 mL/hr, Last Rate: 100 mL/hr (10/05/22 0918)        Vital signs in last 24 hours:  Temp:  [96.9 °F (36.1 °C)-97.8 °F (36.6 °C)] 97.8 °F (36.6 °C)  Heart Rate:  [86-89] 87  Resp:  [16-18] 18  BP: (123-143)/(70-81) 125/79    Intake/Output:  No intake or output data in the 24 hours ending 10/05/22 1429    Exam:  /79 (BP Location: Right arm, Patient Position: Lying)   Pulse 87   Temp 97.8 °F (36.6 °C) (Oral)   Resp 18   Ht 175.3 cm (69.02\")   Wt 100 kg (220 lb 7.4 oz)   SpO2 98%   BMI 32.54 kg/m²     General Appearance:    Alert, cooperative, no distress   Head:    Normocephalic, without obvious abnormality, atraumatic   Eyes:       Throat:   Lips, tongue, gums normal   Neck:   Supple, symmetrical, trachea midline, no JVD   Lungs:     Clear to auscultation bilaterally, respirations unlabored   Chest Wall:    No tenderness or deformity    Heart:    Regular rate and rhythm, S1 and S2 normal, no murmur,no  Rub or gallop   Abdomen:     Soft, nontender, bowel sounds active, no masses, no organomegaly    Extremities:   Extremities normal, atraumatic, no cyanosis or edema   Pulses:      Skin:   Skin is warm and dry,  no rashes or palpable lesions   Neurologic:   no focal deficits noted      Lab Results (last 72 hours)     Procedure Component Value Units Date/Time    Basic Metabolic Panel [[id number]]  (Abnormal) " Collected: 10/02/22 0526    Specimen: Blood Updated: 10/02/22 0610     Glucose 164 mg/dL      BUN 21 mg/dL      Creatinine 1.64 mg/dL      Sodium 133 mmol/L      Potassium 4.8 mmol/L      Chloride 104 mmol/L      CO2 21.0 mmol/L      Calcium 8.5 mg/dL      BUN/Creatinine Ratio 12.8     Anion Gap 8.0 mmol/L      eGFR 41.8 mL/min/1.73      Comment: National Kidney Foundation and American Society of Nephrology (ASN) Task Force recommended calculation based on the Chronic Kidney Disease Epidemiology Collaboration (CKD-EPI) equation refit without adjustment for race.       Narrative:      GFR Normal >60  Chronic Kidney Disease <60  Kidney Failure <15      CBC (No Diff) [[id number]]  (Abnormal) Collected: 10/02/22 0526    Specimen: Blood Updated: 10/02/22 0553     WBC 7.67 10*3/mm3      RBC 2.97 10*6/mm3      Hemoglobin 10.6 g/dL      Hematocrit 31.1 %      .7 fL      MCH 35.7 pg      MCHC 34.1 g/dL      RDW 13.8 %      RDW-SD 52.9 fl      MPV 9.5 fL      Platelets 70 10*3/mm3     POC Glucose Once [[id number]]  (Abnormal) Collected: 10/01/22 2103    Specimen: Blood Updated: 10/01/22 2105     Glucose 184 mg/dL      Comment: Meter: [id number] : [id number] Anthony Desiraealeksey GOMEZ       Hemoglobin A1c [[id number]]  (Normal) Collected: 10/01/22 0729    Specimen: Blood Updated: 10/01/22 2023     Hemoglobin A1C 5.10 %     Narrative:      Hemoglobin A1C Ranges:    Increased Risk for Diabetes  5.7% to 6.4%  Diabetes                     >= 6.5%  Diabetic Goal                < 7.0%    POC Glucose Once [[id number]]  (Abnormal) Collected: 10/01/22 1649    Specimen: Blood Updated: 10/01/22 1651     Glucose 284 mg/dL      Comment: Meter: [id number] : [id number] Josemanuel GOMEZ       CBC (No Diff) [[id number]]  (Abnormal) Collected: 10/01/22 0729    Specimen: Blood Updated: 10/01/22 0845     WBC 8.07 10*3/mm3      RBC 3.32 10*6/mm3      Hemoglobin 11.9 g/dL      Hematocrit 35.0 %      .4 fL      MCH 35.8 pg      MCHC  34.0 g/dL      RDW 13.7 %      RDW-SD 53.2 fl      MPV 10.1 fL      Platelets 83 10*3/mm3     Basic Metabolic Panel [[id number]]  (Abnormal) Collected: 10/01/22 0729    Specimen: Blood Updated: 10/01/22 0808     Glucose 221 mg/dL      BUN 17 mg/dL      Creatinine 1.53 mg/dL      Sodium 131 mmol/L      Potassium 4.8 mmol/L      Chloride 97 mmol/L      CO2 19.8 mmol/L      Calcium 8.6 mg/dL      BUN/Creatinine Ratio 11.1     Anion Gap 14.2 mmol/L      eGFR 45.4 mL/min/1.73      Comment: National Kidney Foundation and American Society of Nephrology (ASN) Task Force recommended calculation based on the Chronic Kidney Disease Epidemiology Collaboration (CKD-EPI) equation refit without adjustment for race.       Narrative:      GFR Normal >60  Chronic Kidney Disease <60  Kidney Failure <15      Tissue Pathology Exam [[id number]] Collected: 09/30/22 1741    Specimen: Tissue from Urinary Bladder Updated: 09/30/22 2215    POC Glucose Once [[id number]]  (Normal) Collected: 09/30/22 1841    Specimen: Blood Updated: 09/30/22 1843     Glucose 99 mg/dL      Comment: Meter: [id number] : [id number] Ann Augustin RN       POC Glucose Once [[id number]]  (Normal) Collected: 09/30/22 1334    Specimen: Blood Updated: 09/30/22 1407     Glucose 117 mg/dL      Comment: Meter: [id number] : [id number] Richar GOMEZ           Data Review:  Results from last 7 days   Lab Units 10/05/22  0706 10/04/22  0640 10/03/22  0707   SODIUM mmol/L 132* 132* 131*   POTASSIUM mmol/L 4.0 4.3 4.6   CHLORIDE mmol/L 102 102 104   CO2 mmol/L 21.0* 18.7* 21.3*   BUN mg/dL 20 22 23   CREATININE mg/dL 1.40* 1.55* 1.68*   GLUCOSE mg/dL 136* 134* 147*   CALCIUM mg/dL 8.4* 8.5* 8.4*     Results from last 7 days   Lab Units 10/05/22  0706 10/04/22  0640 10/03/22  0707   WBC 10*3/mm3 6.13 7.31 6.69   HEMOGLOBIN g/dL 10.2* 11.0* 9.8*   HEMATOCRIT % 29.5* 31.8* 28.4*   PLATELETS 10*3/mm3 98* 84* 69*         Results from last 7 days   Lab Units  10/01/22  0729   HEMOGLOBIN A1C % 5.10     Lab Results   Lab Value Date/Time    TROPONINT <0.010 08/01/2022 1607    TROPONINT <0.010 09/28/2021 1400    TROPONINT <0.01 01/19/2021 1303               Invalid input(s): PROT, LABALBU      Results from last 7 days   Lab Units 10/01/22  0729   HEMOGLOBIN A1C % 5.10     Glucose   Date/Time Value Ref Range Status   10/05/2022 1203 127 70 - 130 mg/dL Final     Comment:     Meter: [id number] : [id number] Macho Bui NA   10/05/2022 0743 133 (H) 70 - 130 mg/dL Final     Comment:     Meter: [id number] : [id number] Macho Bui NA   10/04/2022 1537 186 (H) 70 - 130 mg/dL Final     Comment:     Meter: [id number] : [id number] Yara Elam NA   10/04/2022 1058 197 (H) 70 - 130 mg/dL Final     Comment:     Meter: [id number] : [id number]   10/04/2022 0734 137 (H) 70 - 130 mg/dL Final     Comment:     Meter: [id number] : [id number]   10/03/2022 1629 207 (H) 70 - 130 mg/dL Final     Comment:     Meter: [id number] : [id number] Cathryn Fung CNA   10/03/2022 1202 177 (H) 70 - 130 mg/dL Final     Comment:     Meter: [id number] : [id number] Cathryn Antonieta CNA   10/03/2022 0754 159 (H) 70 - 130 mg/dL Final     Comment:     Meter: [id number] : [id number] Cathryn Antonieta CNA           Past Medical History:   Diagnosis Date   • Aneurysm of aorta (HCC)    • Anxiety and depression    • Arthritis    • BPH (benign prostatic hyperplasia)    • Chronic UTI    • Cirrhosis (HCC)    • Diabetes mellitus (HCC)    • H/O esophageal varices    • Hard of hearing    • History of frequent urinary tract infections    • History of MI (myocardial infarction)     STATES WAS TOLD HE HAD IN PAST, NEVER SAW CARDIOLOGY   • Hypertension    • Pulmonary nodules    • Self-catheterizes urinary bladder        Assessment:  Active Hospital Problems    Diagnosis  POA   • **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       Plan:  Continue with current RX. Follow lab. Glycemic control.  Surgery today.    Austyn Velazco MD  10/5/2022  14:29 EDT

## 2022-10-05 NOTE — PROGRESS NOTES
"  [facility] Progress Note    Chief Complaint: None    Still having some drainage from his suprapubic cystotomy site as expected.  No fevers or chills.  Some low back pain.  Heading down for nephrostomy tube placement today.    Review of Systems:    The following systems were reviewed and negative;  respiratory and cardiovascular          Vital Signs  /85   Pulse 92   Temp 98.1 °F (36.7 °C) (Oral)   Resp 16   Ht 175.3 cm (69.02\")   Wt 100 kg (220 lb 7.4 oz)   SpO2 100%   BMI 32.54 kg/m²     Physical Exam:     General Appearance:    Alert, cooperative, NAD   HEENT:    No trauma, pupils reactive, hearing intact   Back:     No CVA tenderness   Lungs:     Respirations unlabored, no wheezing    Heart:    RRR, intact peripheral pulses   Abdomen:     Soft, NDNT, no masses, no guarding   :   Deferred   Extremities:   No edema, no deformity   Lymphatic:   No neck or groin LAD   Skin:   No bleeding, bruising or rashes   Neuro/Psych:   Orientation intact, mood/affect pleasant, no focal findings        Results Review:     I reviewed the patient's new clinical results.  Lab Results (last 24 hours)     Procedure Component Value Units Date/Time    POC Glucose Once [[id number]]  (Normal) Collected: 10/05/22 1203    Specimen: Blood Updated: 10/05/22 1204     Glucose 127 mg/dL      Comment: Meter: [id number] : [id number] Macho GOMEZ       Basic Metabolic Panel [[id number]]  (Abnormal) Collected: 10/05/22 0706    Specimen: Blood Updated: 10/05/22 0749     Glucose 136 mg/dL      BUN 20 mg/dL      Creatinine 1.40 mg/dL      Sodium 132 mmol/L      Potassium 4.0 mmol/L      Chloride 102 mmol/L      CO2 21.0 mmol/L      Calcium 8.4 mg/dL      BUN/Creatinine Ratio 14.3     Anion Gap 9.0 mmol/L      eGFR 50.5 mL/min/1.73      Comment: National Kidney Foundation and American Society of Nephrology (ASN) Task Force recommended calculation based on the Chronic Kidney Disease Epidemiology Collaboration (CKD-EPI) equation " refit without adjustment for race.       Narrative:      GFR Normal >60  Chronic Kidney Disease <60  Kidney Failure <15      POC Glucose Once [[id number]]  (Abnormal) Collected: 10/05/22 0743    Specimen: Blood Updated: 10/05/22 0745     Glucose 133 mg/dL      Comment: Meter: [id number] : [id number] Macho GOMEZ       CBC (No Diff) [[id number]]  (Abnormal) Collected: 10/05/22 0706    Specimen: Blood Updated: 10/05/22 0733     WBC 6.13 10*3/mm3      RBC 2.85 10*6/mm3      Hemoglobin 10.2 g/dL      Hematocrit 29.5 %      .5 fL      MCH 35.8 pg      MCHC 34.6 g/dL      RDW 13.9 %      RDW-SD 53.4 fl      MPV 9.4 fL      Platelets 98 10*3/mm3         Imaging Results (Last 24 Hours)     Procedure Component Value Units Date/Time    CT Abdomen Pelvis Without Contrast [[id number]] Collected: 10/04/22 0945     Updated: 10/05/22 1235    Narrative:      CT ABDOMEN AND PELVIS WITH AND WITHOUT CONTRAST     HISTORY: 82-year-old male with hydronephrosis., UTI, hematuria.     TECHNIQUE: Axial CT images of the abdomen and pelvis were obtained  without administration of intravenous contrast. The patient was not  given oral contrast. Coronal and sagittal reformats were obtained.     COMPARISON: None available.     FINDINGS: The right adrenal gland is normal. The left adrenal gland is  somewhat obscured by large collaterals. There are no renal calculi. No  hydronephrosis. The right ureter demonstrates normal caliber. There is  no left-sided hydronephrosis. There is a fluid collection seen along the  left lateral pelvic wall and extending superiorly within the left side  of the retroperitoneum and also extending into the preperitoneal space.  This appears simple. There are a couple of foci of extraluminal air seen  within this collection on image 118. The urinary bladder contains a  suprapubic catheter in place and remains thick-walled and decompressed.     The liver is cirrhotic in morphology. The spleen is enlarged  measuring  15 cm. There are large portosystemic collaterals surrounding the distal  esophagus in the perigastric region. There is a spontaneous splenorenal  shunt present. The gallbladder is distended with dependent hyperdensity  that may represent sludge and/or stones. The pancreas is normal. There  is no evidence of bowel obstruction. L4-L5 and L5-S1 degenerative disc  disease with vacuum disc phenomena is present. Diffuse anasarca is  present.       Impression:      1. No renal calculi or hydronephrosis. The patient has recently had a  bladder diverticulectomy. Simple appearing fluid is seen within the left  retroperitoneum along the left lateral pelvic wall and extending into  the preperitoneal space. A urinoma cannot be excluded and follow-up with  contrast-enhanced delayed imaging is recommended.  2. Hepatic cirrhosis with sequela of portal hypertension to include  splenomegaly and large portosystemic collaterals with a spontaneous  splenorenal shunt.     Dr. Dickerson has been paged to discuss these findings.     Images were repeated following administration of intravenous contrast in  the venous and delayed phases. The enhancement of the liver is  heterogeneous and a follow-up with nonemergent MRI of the liver should  be performed for evaluation of focal abnormalities. Both kidneys enhance  symmetrically. Mucosal hyperenhancement is demonstrated within a  decompressed and thickened urinary bladder. On the delayed images,  extravasation of contrast is demonstrated within this fluid collection.  The site of ureteric obstruction is best demonstrated on image 119  series 5. This fluid collection is thus consistent with a urinoma.     Radiation dose reduction techniques were utilized, including automated  exposure control and exposure modulation based on body size.     This report was finalized on 10/5/2022 12:32 PM by Dr. Nannette Burgos M.D.       CT Abdomen Pelvis With Contrast [[id number]] Collected: 10/04/22  0945     Updated: 10/05/22 1235    Narrative:      CT ABDOMEN AND PELVIS WITH AND WITHOUT CONTRAST     HISTORY: 82-year-old male with hydronephrosis., UTI, hematuria.     TECHNIQUE: Axial CT images of the abdomen and pelvis were obtained  without administration of intravenous contrast. The patient was not  given oral contrast. Coronal and sagittal reformats were obtained.     COMPARISON: None available.     FINDINGS: The right adrenal gland is normal. The left adrenal gland is  somewhat obscured by large collaterals. There are no renal calculi. No  hydronephrosis. The right ureter demonstrates normal caliber. There is  no left-sided hydronephrosis. There is a fluid collection seen along the  left lateral pelvic wall and extending superiorly within the left side  of the retroperitoneum and also extending into the preperitoneal space.  This appears simple. There are a couple of foci of extraluminal air seen  within this collection on image 118. The urinary bladder contains a  suprapubic catheter in place and remains thick-walled and decompressed.     The liver is cirrhotic in morphology. The spleen is enlarged measuring  15 cm. There are large portosystemic collaterals surrounding the distal  esophagus in the perigastric region. There is a spontaneous splenorenal  shunt present. The gallbladder is distended with dependent hyperdensity  that may represent sludge and/or stones. The pancreas is normal. There  is no evidence of bowel obstruction. L4-L5 and L5-S1 degenerative disc  disease with vacuum disc phenomena is present. Diffuse anasarca is  present.       Impression:      1. No renal calculi or hydronephrosis. The patient has recently had a  bladder diverticulectomy. Simple appearing fluid is seen within the left  retroperitoneum along the left lateral pelvic wall and extending into  the preperitoneal space. A urinoma cannot be excluded and follow-up with  contrast-enhanced delayed imaging is recommended.  2.  Hepatic cirrhosis with sequela of portal hypertension to include  splenomegaly and large portosystemic collaterals with a spontaneous  splenorenal shunt.     Dr. Dickerson has been paged to discuss these findings.     Images were repeated following administration of intravenous contrast in  the venous and delayed phases. The enhancement of the liver is  heterogeneous and a follow-up with nonemergent MRI of the liver should  be performed for evaluation of focal abnormalities. Both kidneys enhance  symmetrically. Mucosal hyperenhancement is demonstrated within a  decompressed and thickened urinary bladder. On the delayed images,  extravasation of contrast is demonstrated within this fluid collection.  The site of ureteric obstruction is best demonstrated on image 119  series 5. This fluid collection is thus consistent with a urinoma.     Radiation dose reduction techniques were utilized, including automated  exposure control and exposure modulation based on body size.     This report was finalized on 10/5/2022 12:32 PM by Dr. Nannette Burgos M.D.             Medication Review:   I have personally reviewed    Current Facility-Administered Medications:   •  Cariprazine HCl capsule 4.5 mg, 4.5 mg, Oral, Q PM, Austyn Velazco MD, 4.5 mg at 10/04/22 1733  •  cefTRIAXone (ROCEPHIN) 1 g in sodium chloride 0.9 % 100 mL IVPB-VTB, 1 g, Intravenous, Q24H, Art Dickerson MD, Last Rate: 200 mL/hr at 10/05/22 0918, 1 g at 10/05/22 0918  •  dextrose (D50W) (25 g/50 mL) IV injection 25 g, 25 g, Intravenous, Q15 Min PRN, Austyn Velazco MD  •  dextrose (GLUTOSE) oral gel 15 g, 15 g, Oral, Q15 Min PRN, Austyn Velazco MD  •  fentaNYL citrate (PF) (SUBLIMAZE) injection, , Intravenous, Code / Trauma / Sedation Medication, Siddhartha Lieberman MD, 50 mcg at 10/05/22 1543  •  finasteride (PROSCAR) tablet 5 mg, 5 mg, Oral, Daily, Austyn Velazco MD, 5 mg at 10/05/22 0923  •  glucagon (human recombinant) (GLUCAGEN DIAGNOSTIC) injection 1 mg, 1  mg, Intramuscular, Q15 Min PRN, Austyn Velazco MD  •  HYDROmorphone (DILAUDID) injection 0.5 mg, 0.5 mg, Intravenous, Q2H PRN, 0.5 mg at 10/04/22 1443 **AND** naloxone (NARCAN) injection 0.1 mg, 0.1 mg, Intravenous, Q5 Min PRN, Art Dickerson MD  •  insulin lispro (ADMELOG) injection 0-7 Units, 0-7 Units, Subcutaneous, TID AC, Austyn Velazco MD, 2 Units at 10/04/22 1733  •  melatonin tablet 5 mg, 5 mg, Oral, Nightly, Austyn Velazco MD, 5 mg at 10/04/22 2031  •  midazolam (VERSED) injection, , Intravenous, Code / Trauma / Sedation Medication, Siddhartha Lieberman MD, 1 mg at 10/05/22 1543  •  ondansetron (ZOFRAN) tablet 4 mg, 4 mg, Oral, Q6H PRN **OR** ondansetron (ZOFRAN) injection 4 mg, 4 mg, Intravenous, Q6H PRN, Art Dickerson MD  •  oxyCODONE-acetaminophen (PERCOCET) 7.5-325 MG per tablet 1 tablet, 1 tablet, Oral, Q4H PRN, Art Dickerson MD, 1 tablet at 10/05/22 1353  •  pioglitazone (ACTOS) tablet 30 mg, 30 mg, Oral, Daily, Austyn Velazco MD, 30 mg at 10/05/22 0923  •  sodium chloride 0.9 % flush 10 mL, 10 mL, Intravenous, PRN, Art Dickerson MD  •  sodium chloride 0.9 % flush 3 mL, 3 mL, Intravenous, Q12H, Art Dickerson MD, 3 mL at 10/05/22 0923  •  sodium chloride 0.9 % infusion, 100 mL/hr, Intravenous, Continuous, Art Dickerson MD, Last Rate: 100 mL/hr at 10/05/22 0918, 100 mL/hr at 10/05/22 0918    Allergies:    Penicillins and Morphine    Assessment:    Active Problems:  Patient Active Problem List   Diagnosis   • BPH with obstruction/lower urinary tract symptoms   • Essential hypertension   • Type 2 diabetes mellitus without complication (HCC)   • Thrombocytopenia (HCC)   • B12 deficiency   • Syncope and collapse   • Altered mental status, unspecified altered mental status type   • Cytokine release syndrome, grade 1   • Acquired bladder diverticulum   • Surgery, elective   • History of MI (myocardial infarction)   • Cirrhosis (HCC)       Postop day 5 partial  cystectomy for large bladder diverticulum now with iatrogenic ureteral injury and developing urinoma    Plan:    Nephrostomy tube placement today.  Delayed reimplantation later.  Hopefully home here later this week.      Art Dickerson MD    10/5/2022  16:06 EDT

## 2022-10-05 NOTE — PLAN OF CARE
Goal Outcome Evaluation:         Pt s/p nephrostomy tube placement today. Pt states he is feeling better since the procedure and denies the need for any pain medication. Pt's suprapubic catheter still leaking yellow drainage today, MD aware. This RN changed pt's abdominal dressing x3 times this shift. Pt also tolerated regular diet post op. He is resting comfortably in bed at this time.

## 2022-10-05 NOTE — POST-PROCEDURE NOTE
POST PROCEDURE NOTE    Procedure: NEPHROSTOMY TUBE PLACEMENT    Pre-Procedure Left ureteral leak    Post-procedure Diagnosis: same    Findings: successf.left neph tube plcmt    Complications: none    Blood loss: min    Specimen Removed: urine sample    Disposition:   Transfer back to inpatient room

## 2022-10-06 LAB
ANION GAP SERPL CALCULATED.3IONS-SCNC: 11 MMOL/L (ref 5–15)
BUN SERPL-MCNC: 13 MG/DL (ref 8–23)
BUN/CREAT SERPL: 15.1 (ref 7–25)
CALCIUM SPEC-SCNC: 8.6 MG/DL (ref 8.6–10.5)
CHLORIDE SERPL-SCNC: 103 MMOL/L (ref 98–107)
CO2 SERPL-SCNC: 21 MMOL/L (ref 22–29)
CREAT SERPL-MCNC: 0.86 MG/DL (ref 0.76–1.27)
DEPRECATED RDW RBC AUTO: 52.9 FL (ref 37–54)
EGFRCR SERPLBLD CKD-EPI 2021: 87 ML/MIN/1.73
ERYTHROCYTE [DISTWIDTH] IN BLOOD BY AUTOMATED COUNT: 13.9 % (ref 12.3–15.4)
GLUCOSE BLDC GLUCOMTR-MCNC: 129 MG/DL (ref 70–130)
GLUCOSE BLDC GLUCOMTR-MCNC: 182 MG/DL (ref 70–130)
GLUCOSE BLDC GLUCOMTR-MCNC: 191 MG/DL (ref 70–130)
GLUCOSE BLDC GLUCOMTR-MCNC: 210 MG/DL (ref 70–130)
GLUCOSE SERPL-MCNC: 155 MG/DL (ref 65–99)
HCT VFR BLD AUTO: 31.9 % (ref 37.5–51)
HGB BLD-MCNC: 11.2 G/DL (ref 13–17.7)
MCH RBC QN AUTO: 36.5 PG (ref 26.6–33)
MCHC RBC AUTO-ENTMCNC: 35.1 G/DL (ref 31.5–35.7)
MCV RBC AUTO: 103.9 FL (ref 79–97)
PLATELET # BLD AUTO: 128 10*3/MM3 (ref 140–450)
PMV BLD AUTO: 9.8 FL (ref 6–12)
POTASSIUM SERPL-SCNC: 3.8 MMOL/L (ref 3.5–5.2)
RBC # BLD AUTO: 3.07 10*6/MM3 (ref 4.14–5.8)
SODIUM SERPL-SCNC: 135 MMOL/L (ref 136–145)
WBC NRBC COR # BLD: 5.29 10*3/MM3 (ref 3.4–10.8)

## 2022-10-06 PROCEDURE — 25010000002 CEFTRIAXONE PER 250 MG: Performed by: UROLOGY

## 2022-10-06 PROCEDURE — 85027 COMPLETE CBC AUTOMATED: CPT | Performed by: UROLOGY

## 2022-10-06 PROCEDURE — 97530 THERAPEUTIC ACTIVITIES: CPT

## 2022-10-06 PROCEDURE — 80048 BASIC METABOLIC PNL TOTAL CA: CPT | Performed by: UROLOGY

## 2022-10-06 PROCEDURE — 97110 THERAPEUTIC EXERCISES: CPT

## 2022-10-06 PROCEDURE — 82962 GLUCOSE BLOOD TEST: CPT

## 2022-10-06 PROCEDURE — 25010000002 FUROSEMIDE PER 20 MG: Performed by: UROLOGY

## 2022-10-06 PROCEDURE — 63710000001 INSULIN LISPRO (HUMAN) PER 5 UNITS: Performed by: HOSPITALIST

## 2022-10-06 RX ORDER — FUROSEMIDE 10 MG/ML
20 INJECTION INTRAMUSCULAR; INTRAVENOUS ONCE
Status: COMPLETED | OUTPATIENT
Start: 2022-10-06 | End: 2022-10-06

## 2022-10-06 RX ORDER — AMOXICILLIN 250 MG
2 CAPSULE ORAL 2 TIMES DAILY
Status: DISCONTINUED | OUTPATIENT
Start: 2022-10-06 | End: 2022-10-07 | Stop reason: HOSPADM

## 2022-10-06 RX ORDER — ALPRAZOLAM 0.25 MG/1
0.25 TABLET ORAL 3 TIMES DAILY PRN
Status: DISCONTINUED | OUTPATIENT
Start: 2022-10-06 | End: 2022-10-07 | Stop reason: HOSPADM

## 2022-10-06 RX ADMIN — CARIPRAZINE 4.5 MG: 4.5 CAPSULE, GELATIN COATED ORAL at 17:25

## 2022-10-06 RX ADMIN — SODIUM CHLORIDE 100 ML/HR: 9 INJECTION, SOLUTION INTRAVENOUS at 14:46

## 2022-10-06 RX ADMIN — INSULIN LISPRO 2 UNITS: 100 INJECTION, SOLUTION INTRAVENOUS; SUBCUTANEOUS at 17:24

## 2022-10-06 RX ADMIN — DOCUSATE SODIUM 50MG AND SENNOSIDES 8.6MG 2 TABLET: 8.6; 5 TABLET, FILM COATED ORAL at 21:28

## 2022-10-06 RX ADMIN — CEFTRIAXONE SODIUM 1 G: 1 INJECTION, POWDER, FOR SOLUTION INTRAMUSCULAR; INTRAVENOUS at 09:40

## 2022-10-06 RX ADMIN — Medication 5 MG: at 21:28

## 2022-10-06 RX ADMIN — FUROSEMIDE 20 MG: 10 INJECTION, SOLUTION INTRAMUSCULAR; INTRAVENOUS at 17:24

## 2022-10-06 RX ADMIN — OXYCODONE HYDROCHLORIDE AND ACETAMINOPHEN 1 TABLET: 7.5; 325 TABLET ORAL at 05:12

## 2022-10-06 RX ADMIN — OXYCODONE HYDROCHLORIDE AND ACETAMINOPHEN 1 TABLET: 7.5; 325 TABLET ORAL at 23:34

## 2022-10-06 RX ADMIN — FINASTERIDE 5 MG: 5 TABLET, FILM COATED ORAL at 09:40

## 2022-10-06 RX ADMIN — OXYCODONE HYDROCHLORIDE AND ACETAMINOPHEN 1 TABLET: 7.5; 325 TABLET ORAL at 14:42

## 2022-10-06 RX ADMIN — Medication 3 ML: at 09:40

## 2022-10-06 RX ADMIN — PIOGLITAZONE HYDROCHLORIDE 30 MG: 30 TABLET ORAL at 09:40

## 2022-10-06 RX ADMIN — DOCUSATE SODIUM 50MG AND SENNOSIDES 8.6MG 2 TABLET: 8.6; 5 TABLET, FILM COATED ORAL at 14:42

## 2022-10-06 RX ADMIN — ALPRAZOLAM 0.25 MG: 0.25 TABLET ORAL at 17:24

## 2022-10-06 NOTE — PROGRESS NOTES
"  [facility] Progress Note    Chief Complaint: Anxious to go home    Tolerated neph tube and this is draining well. Drainage from sp site has decreased. He is anxious and a little upset today. Wanting to go home.    Review of Systems:    The following systems were reviewed and negative;  respiratory and cardiovascular          Vital Signs  /79 (BP Location: Right arm, Patient Position: Lying)   Pulse 88   Temp 97.2 °F (36.2 °C) (Oral)   Resp 16   Ht 175.3 cm (69.02\")   Wt 100 kg (220 lb 7.4 oz)   SpO2 99%   BMI 32.54 kg/m²     Physical Exam:     General Appearance:    Alert, cooperative, NAD   HEENT:    No trauma, pupils reactive, hearing intact   Back:     No CVA tenderness   Lungs:     Respirations unlabored, no wheezing    Heart:    RRR, intact peripheral pulses   Abdomen:     Soft, non distended and min tender   :    Phallus nl   Extremities:   Moderate upper and lower extremity edema, no deformity   Lymphatic:   No neck or groin LAD   Skin:   No bleeding, bruising or rashes   Neuro/Psych:   Orientation intact, no focal findings        Results Review:     I reviewed the patient's new clinical results.  Lab Results (last 24 hours)     Procedure Component Value Units Date/Time    POC Glucose Once [[id number]]  (Abnormal) Collected: 10/06/22 1208    Specimen: Blood Updated: 10/06/22 1210     Glucose 182 mg/dL      Comment: Meter: [id number] : [id number] Macho GOMEZ       Body Fluid Culture - Body Fluid, Retroperitoneum [[id number]] Collected: 10/05/22 1613    Specimen: Body Fluid from Retroperitoneum Updated: 10/06/22 1048     Body Fluid Culture No growth     Gram Stain No WBCs or organisms seen    Basic Metabolic Panel [[id number]]  (Abnormal) Collected: 10/06/22 0904    Specimen: Blood Updated: 10/06/22 0951     Glucose 155 mg/dL      BUN 13 mg/dL      Creatinine 0.86 mg/dL      Sodium 135 mmol/L      Potassium 3.8 mmol/L      Chloride 103 mmol/L      CO2 21.0 mmol/L      Calcium 8.6 mg/dL "      BUN/Creatinine Ratio 15.1     Anion Gap 11.0 mmol/L      eGFR 87.0 mL/min/1.73      Comment: National Kidney Foundation and American Society of Nephrology (ASN) Task Force recommended calculation based on the Chronic Kidney Disease Epidemiology Collaboration (CKD-EPI) equation refit without adjustment for race.       Narrative:      GFR Normal >60  Chronic Kidney Disease <60  Kidney Failure <15      CBC (No Diff) [[id number]]  (Abnormal) Collected: 10/06/22 0904    Specimen: Blood Updated: 10/06/22 0930     WBC 5.29 10*3/mm3      RBC 3.07 10*6/mm3      Hemoglobin 11.2 g/dL      Hematocrit 31.9 %      .9 fL      MCH 36.5 pg      MCHC 35.1 g/dL      RDW 13.9 %      RDW-SD 52.9 fl      MPV 9.8 fL      Platelets 128 10*3/mm3     POC Glucose Once [[id number]]  (Normal) Collected: 10/06/22 0758    Specimen: Blood Updated: 10/06/22 0759     Glucose 129 mg/dL      Comment: Meter: [id number] : [id number] Macho Ramya JASON       POC Glucose Once [[id number]]  (Abnormal) Collected: 10/05/22 1708    Specimen: Blood Updated: 10/05/22 1720     Glucose 155 mg/dL      Comment: Meter: [id number] : [id number] Macho Ramya JASON           Imaging Results (Last 24 Hours)     Procedure Component Value Units Date/Time    IR Nephrostomy Tube Placement [[id number]] Collected: 10/05/22 1737     Updated: 10/05/22 1754    Narrative:      NEPHROSTOMY TUBE PLACEMENT     HISTORY: Left ureteral leak     COMPARISON: CT 10/04/2022      TECHNIQUE: After informed consent was obtained and documented, the  patient was brought to the angiography suite and placed on the table in  prone position. A verbal time-out was performed with appropriate  identifiers verified. A nurse was present for continuous nurse  monitoring and to administer conscious sedation under physician  supervision for 30 minutes utilizing 1 mg Versed and 50 mcg fentanyl.  The left flank was prepped and draped in the normal sterile fashion. 1%  lidocaine was infiltrated for  local anesthesia. Under ultrasound  guidance, a 22 Gauge spinal needle was used to access a posterior  inferior calyx of the left kidney. Contrast was gently injected to  opacify the collecting system. Then a 0.018 wire was advanced through  the needle into the collecting system. The needle was then exchanged out  over wire for a 3 Beninese coaxial dilator and correct position confirmed  by coaxial contrast injection around to the wire. The 5 Beninese coaxial  dilator was then reassembled and placed into the renal collecting system  over the 0.018 wire. A 0.035 guidewire was advanced through the dilator  into the renal pelvis and after dilatation of the fascia, an 8 Beninese  multi-purpose drain was placed over the wire into the renal pelvis.  Contrast injection confirmed expected catheter positioning. The renal  collecting system was noted to be decompressed, apparent dilation of the  distal ureter was seen. Additionally, contrast leakage from the distal  ureter corresponding to CT findings was demonstrated. The catheter was  attached to a drainage bag, secured to skin with 2 Dermabond reinforced  sutures, and a sterile dressing applied. The patient left the  angiography suite in stable condition, there were no immediate  complications. A sample of urine was sent to lab as requested.  7.4 min.  total fluoro time 15 mL Isovue 250, 209 mGy, 10 exposures.          Impression:      Successful placement of 8 Beninese left nephrostomy catheter under  sonographic and fluoroscopic guidance. Distal left ureteral leak noted.     This report was finalized on 10/5/2022 5:51 PM by Dr. Siddhartha Lieberman M.D.             Medication Review:   I have personally reviewed    Current Facility-Administered Medications:   •  Cariprazine HCl capsule 4.5 mg, 4.5 mg, Oral, Q PM, Austyn Velazco MD, 4.5 mg at 10/05/22 3716  •  cefTRIAXone (ROCEPHIN) 1 g in sodium chloride 0.9 % 100 mL IVPB-VTB, 1 g, Intravenous, Q24H, Art Dickerson MD, Last  Rate: 200 mL/hr at 10/06/22 0940, 1 g at 10/06/22 0940  •  dextrose (D50W) (25 g/50 mL) IV injection 25 g, 25 g, Intravenous, Q15 Min PRN, Austyn Velazco MD  •  dextrose (GLUTOSE) oral gel 15 g, 15 g, Oral, Q15 Min PRN, Austyn Velazco MD  •  finasteride (PROSCAR) tablet 5 mg, 5 mg, Oral, Daily, Austyn Velazco MD, 5 mg at 10/06/22 0940  •  glucagon (human recombinant) (GLUCAGEN DIAGNOSTIC) injection 1 mg, 1 mg, Intramuscular, Q15 Min PRN, Austyn Velazco MD  •  HYDROmorphone (DILAUDID) injection 0.5 mg, 0.5 mg, Intravenous, Q2H PRN, 0.5 mg at 10/04/22 1443 **AND** naloxone (NARCAN) injection 0.1 mg, 0.1 mg, Intravenous, Q5 Min PRN, Art Dickerson MD  •  insulin lispro (ADMELOG) injection 0-7 Units, 0-7 Units, Subcutaneous, TID AC, Austyn Velazco MD, 2 Units at 10/05/22 1716  •  melatonin tablet 5 mg, 5 mg, Oral, Nightly, Austyn Velazco MD, 5 mg at 10/05/22 2024  •  ondansetron (ZOFRAN) tablet 4 mg, 4 mg, Oral, Q6H PRN **OR** ondansetron (ZOFRAN) injection 4 mg, 4 mg, Intravenous, Q6H PRN, Art Dickerson MD  •  oxyCODONE-acetaminophen (PERCOCET) 7.5-325 MG per tablet 1 tablet, 1 tablet, Oral, Q4H PRN, Art Dickerson MD, 1 tablet at 10/06/22 1442  •  pioglitazone (ACTOS) tablet 30 mg, 30 mg, Oral, Daily, Austyn Velazco MD, 30 mg at 10/06/22 0940  •  sennosides-docusate (PERICOLACE) 8.6-50 MG per tablet 2 tablet, 2 tablet, Oral, BID, Art Dickerson MD, 2 tablet at 10/06/22 1442  •  sodium chloride 0.9 % flush 10 mL, 10 mL, Intravenous, PRN, Art Dickerson MD  •  sodium chloride 0.9 % flush 3 mL, 3 mL, Intravenous, Q12H, Art Dickerson MD, 3 mL at 10/06/22 0940  •  sodium chloride 0.9 % infusion, 100 mL/hr, Intravenous, Continuous, Art Dickerson MD, Last Rate: 100 mL/hr at 10/06/22 1446, 100 mL/hr at 10/06/22 1446    Allergies:    Penicillins and Morphine    Assessment:    Active Problems:  Patient Active Problem List   Diagnosis   • BPH with obstruction/lower urinary  tract symptoms   • Essential hypertension   • Type 2 diabetes mellitus without complication (HCC)   • Thrombocytopenia (HCC)   • B12 deficiency   • Syncope and collapse   • Altered mental status, unspecified altered mental status type   • Cytokine release syndrome, grade 1   • Acquired bladder diverticulum   • Surgery, elective   • History of MI (myocardial infarction)   • Cirrhosis (HCC)       POD#6 Partial cystectomy for acquired diverticulum  Left ureteral injury with extravasation s/p neph tube    Plan:    Home Friday with sp tube abd neph tube.   Stopped fluids and diurese.    Art Dickerson MD    10/6/2022  15:23 EDT

## 2022-10-06 NOTE — THERAPY TREATMENT NOTE
Patient Name: Teto Castle  : 1940    MRN: [id number]                              Today's Date: 10/6/2022       Admit Date: 2022    Visit Dx:     ICD-10-CM ICD-9-CM   1. Bladder diverticulum  N32.3 596.3     Patient Active Problem List   Diagnosis   • BPH with obstruction/lower urinary tract symptoms   • Essential hypertension   • Type 2 diabetes mellitus without complication (HCC)   • Thrombocytopenia (HCC)   • B12 deficiency   • Syncope and collapse   • Altered mental status, unspecified altered mental status type   • Cytokine release syndrome, grade 1   • Acquired bladder diverticulum   • Surgery, elective   • History of MI (myocardial infarction)   • Cirrhosis (HCC)     Past Medical History:   Diagnosis Date   • Aneurysm of aorta (HCC)    • Anxiety and depression    • Arthritis    • BPH (benign prostatic hyperplasia)    • Chronic UTI    • Cirrhosis (HCC)    • Diabetes mellitus (HCC)    • H/O esophageal varices    • Hard of hearing    • History of frequent urinary tract infections    • History of MI (myocardial infarction)     STATES WAS TOLD HE HAD IN PAST, NEVER SAW CARDIOLOGY   • Hypertension    • Pulmonary nodules    • Self-catheterizes urinary bladder      Past Surgical History:   Procedure Laterality Date   • CATARACT EXTRACTION, BILATERAL     • COLONOSCOPY  approx     normal per patient   • CYSTECTOMY N/A 2022    Procedure: Bladder Diverticulectomy;  Surgeon: Art Dickerson MD;  Location: [facility];  Service: Urology;  Laterality: N/A;   • CYSTOSCOPY TRANSURETHRAL RESECTION OF PROSTATE N/A 9/10/2018    Procedure: TRANSURETHRAL RESECTION OF PROSTATE;  Surgeon: Art Dickerson MD;  Location: [facility];  Service: Urology   • ENDOSCOPIC FUNCTIONAL SINUS SURGERY (FESS) N/A 5/15/2019    Procedure: ETHMOIDECTOMY,LEFT INTERNASAL  ANTROSTOMY;  Surgeon: Mark Linda MD;  Location: [facility];  Service: ENT   • FRACTURE SURGERY      LT ARM   •  HEMORRHOIDECTOMY     • SEPTOPLASTY N/A 5/15/2019    Procedure: NASAL SEPTOPLASTY,;  Surgeon: Mark Linda MD;  [facility] [place];  Service: ENT   • SUPRAPUBIC TUBE PLACEMENT N/A 9/30/2022    Procedure: SUPRAPUBIC CATHETER INSERTION;  Surgeon: Art Dickerson MD;  Location: [facility] MAIN OR;  Service: Urology;  Laterality: N/A;      General Information     Row Name 10/06/22 1608          Physical Therapy Time and Intention    Document Type therapy note (daily note)  -PH     Mode of Treatment physical therapy  -PH     Row Name 10/06/22 1608          General Information    Existing Precautions/Restrictions fall  -PH     Row Name 10/06/22 1608          Safety Issues, Functional Mobility    Impairments Affecting Function (Mobility) balance;endurance/activity tolerance;strength;shortness of breath  -PH     Comment, Safety Issues/Impairments (Mobility) gt belt and non skid socks donned  -PH           User Key  (r) = Recorded By, (t) = Taken By, (c) = Cosigned By    Initials Name Provider Type    PH Latoya Cagle, PTA Physical Therapist Assistant               Mobility     Row Name 10/06/22 1609          Bed Mobility    Bed Mobility supine-sit-supine  -PH     Supine-Sit Pender (Bed Mobility) standby assist  -PH     Sit-Supine Pender (Bed Mobility) minimum assist (75% patient effort)  -PH     Assistive Device (Bed Mobility) bed rails;head of bed elevated  -PH     Row Name 10/06/22 1609          Sit-Stand Transfer    Sit-Stand Pender (Transfers) contact guard;supervision  -PH     Assistive Device (Sit-Stand Transfers) other (see comments)  IV pole  -PH     Row Name 10/06/22 1609          Gait/Stairs (Locomotion)    Pender Level (Gait) contact guard;supervision  -PH     Assistive Device (Gait) other (see comments)  IV pole  -PH     Distance in Feet (Gait) 400'  -PH     Deviations/Abnormal Patterns (Gait) catalino decreased;gait speed decreased  -PH     Pender Level  (Stairs) not tested  -     Comment, (Gait/Stairs) Pt's R UE on IV pole. approp speed w/ no LOB; steady; SOA noted  -           User Key  (r) = Recorded By, (t) = Taken By, (c) = Cosigned By    Initials Name Provider Type     Latoya Cagle PTA Physical Therapist Assistant               Obj/Interventions     Row Name 10/06/22 1611          Motor Skills    Therapeutic Exercise other (see comments)  LAQ x 15 reps; MD entered room after  -     Row Name 10/06/22 1611          Balance    Balance Assessment sitting static balance;standing static balance  -     Static Sitting Balance modified independence  -     Static Standing Balance standby assist  -PH     Position/Device Used, Standing Balance other (see comments)  IV pole R hand  -     Comment, Balance fairly steady w/ use of IV pole - no LOB; pt attempted SLS however only able to hold briefly w/ B hands on wall rail; lateral step w/ B hands on wall rail.  -           User Key  (r) = Recorded By, (t) = Taken By, (c) = Cosigned By    Initials Name Provider Type     Latoya Cagle PTA Physical Therapist Assistant               Goals/Plan    No documentation.                Clinical Impression     Row Name 10/06/22 1612          Pain    Pretreatment Pain Rating 0/10 - no pain  -PH     Posttreatment Pain Rating 0/10 - no pain  -PH     Pre/Posttreatment Pain Comment denies pain when asked  -     Row Name 10/06/22 1612          Plan of Care Review    Plan of Care Reviewed With patient;daughter  -     Progress no change  -     Outcome Evaluation Pt in bed at beg of PT session. Pt sat up to EOB w/ SBA and was mod I at EOB. Pt amb 400' req SV and use of IV pole in R hand. Pt attempted bal activities, however needed to keep B hands on wall rail for both very brief SLS and lateral amb. Pt at EOB w/ RN and MD at end of session. Pt amb reg w/ staff. PT will s/o at this time.  -     Row Name 10/06/22 1612          Positioning and  Restraints    Pre-Treatment Position in bed  -PH     Post Treatment Position bed  -PH     In Bed sitting EOB;with other staff;with nsg;with family/caregiver  -PH           User Key  (r) = Recorded By, (t) = Taken By, (c) = Cosigned By    Initials Name Provider Type    Latoya Dozier PTA Physical Therapist Assistant               Outcome Measures     Row Name 10/06/22 1615          How much help from another person do you currently need...    Turning from your back to your side while in flat bed without using bedrails? 4  -PH     Moving from lying on back to sitting on the side of a flat bed without bedrails? 4  -PH     Moving to and from a bed to a chair (including a wheelchair)? 3  -PH     Standing up from a chair using your arms (e.g., wheelchair, bedside chair)? 3  -PH     Climbing 3-5 steps with a railing? 2  -PH     To walk in hospital room? 3  -PH     AM-PAC 6 Clicks Score (PT) 19  -PH     Highest level of mobility 6 --> Walked 10 steps or more  -PH     Row Name 10/06/22 1615          Functional Assessment    Outcome Measure Options AM-PAC 6 Clicks Basic Mobility (PT)  -PH           User Key  (r) = Recorded By, (t) = Taken By, (c) = Cosigned By    Initials Name Provider Type    Latoya Dozier PTA Physical Therapist Assistant                             Physical Therapy Education                 Title: PT OT SLP Therapies (Done)     Topic: Physical Therapy (Done)     Point: Mobility training (Done)     Learning Progress Summary           Patient Acceptance, E,D, VU by PH at 10/6/2022 1615    Acceptance, E, VU by HF at 10/5/2022 1928    Acceptance, E, VU by HF at 10/4/2022 1853    Acceptance, E, VU by MG at 10/4/2022 1554                   Point: Home exercise program (Done)     Learning Progress Summary           Patient Acceptance, E,D, VU by PH at 10/6/2022 1615    Acceptance, E, VU by HF at 10/5/2022 1928    Acceptance, E, VU by HF at 10/4/2022 1853                   Point: Body  mechanics (Done)     Learning Progress Summary           Patient Acceptance, E,D, VU by  at 10/6/2022 1615    Acceptance, E, VU by HF at 10/5/2022 1928    Acceptance, E, VU by HF at 10/4/2022 1853    Acceptance, E, VU by MG at 10/4/2022 1554                   Point: Precautions (Done)     Learning Progress Summary           Patient Acceptance, E,D, VU by PH at 10/6/2022 1615    Acceptance, E, VU by  at 10/5/2022 1928    Acceptance, E, VU by HF at 10/4/2022 1853    Acceptance, E, VU by MG at 10/4/2022 1554                               User Key     Initials Effective Dates Name Provider Type Discipline     05/24/22 -  Ramya De La Rosa, PT Physical Therapist PT     06/16/21 -  Latoya Cagle PTA Physical Therapist Assistant PT     09/22/22 -  Falguni Leslie, RN Registered Nurse Nurse              PT Recommendation and Plan     Plan of Care Reviewed With: patient, daughter  Progress: no change  Outcome Evaluation: Pt in bed at beg of PT session. Pt sat up to EOB w/ SBA and was mod I at EOB. Pt amb 400' req SV and use of IV pole in R hand. Pt attempted bal activities, however needed to keep B hands on wall rail for both very brief SLS and lateral amb. Pt at EOB w/ RN and MD at end of session. Pt amb reg w/ staff. PT will s/o at this time.     Time Calculation:    PT Charges     Row Name 10/06/22 1616             Time Calculation    Start Time 1455  -PH      Stop Time 1518  -PH      Time Calculation (min) 23 min  -PH      PT Received On 10/06/22  -PH      PT - Next Appointment 10/07/22  -PH              Timed Charges    75259 - PT Therapeutic Exercise Minutes 3  -PH      24571 - PT Therapeutic Activity Minutes 20  -PH              Total Minutes    Timed Charges Total Minutes 23  -PH       Total Minutes 23  -PH            User Key  (r) = Recorded By, (t) = Taken By, (c) = Cosigned By    Initials Name Provider Type     Latoya Cagle PTA Physical Therapist Assistant              Therapy  Charges for Today     Code Description Service Date Service Provider Modifiers Qty    21619995787 HC PT THERAPEUTIC ACT EA 15 MIN 10/6/2022 Latoya Cagle, PTA GP 1    38522722016 HC PT THER PROC EA 15 MIN 10/6/2022 Latoya Cagle, ARLIN GP 1          PT G-Codes  Outcome Measure Options: AM-PAC 6 Clicks Basic Mobility (PT)  AM-PAC 6 Clicks Score (PT): 19    Latoya Cagle PTA  10/6/2022

## 2022-10-06 NOTE — PROGRESS NOTES
"DAILY PROGRESS NOTE  [facility]    Patient Identification:  Name: Teto Castle  Age: 82 y.o.  Sex: male  :  1940  MRN: [id number]         Primary Care Physician: Cam Dickey MD    Subjective:  Interval History:He complains of pain.  Got nephrostomy tube.    Objective:    Scheduled Meds:Cariprazine HCl, 4.5 mg, Oral, Q PM  cefTRIAXone, 1 g, Intravenous, Q24H  finasteride, 5 mg, Oral, Daily  insulin lispro, 0-7 Units, Subcutaneous, TID AC  melatonin, 5 mg, Oral, Nightly  pioglitazone, 30 mg, Oral, Daily  senna-docusate sodium, 2 tablet, Oral, BID  sodium chloride, 3 mL, Intravenous, Q12H      Continuous Infusions:sodium chloride, 100 mL/hr, Last Rate: 100 mL/hr (10/05/22 0918)        Vital signs in last 24 hours:  Temp:  [96.8 °F (36 °C)-98.1 °F (36.7 °C)] 97.2 °F (36.2 °C)  Heart Rate:  [] 88  Resp:  [16] 16  BP: (111-161)/(66-95) 123/79    Intake/Output:    Intake/Output Summary (Last 24 hours) at 10/6/2022 1425  Last data filed at 10/6/2022 0514  Gross per 24 hour   Intake --   Output 2200 ml   Net -2200 ml       Exam:  /79 (BP Location: Right arm, Patient Position: Lying)   Pulse 88   Temp 97.2 °F (36.2 °C) (Oral)   Resp 16   Ht 175.3 cm (69.02\")   Wt 100 kg (220 lb 7.4 oz)   SpO2 99%   BMI 32.54 kg/m²     General Appearance:    Alert, cooperative, no distress   Head:    Normocephalic, without obvious abnormality, atraumatic   Eyes:       Throat:   Lips, tongue, gums normal   Neck:   Supple, symmetrical, trachea midline, no JVD   Lungs:     Clear to auscultation bilaterally, respirations unlabored   Chest Wall:    No tenderness or deformity    Heart:    Regular rate and rhythm, S1 and S2 normal, no murmur,no  Rub or gallop   Abdomen:     Soft, nontender, bowel sounds active, no masses, no organomegaly    Extremities:   Extremities normal, atraumatic, no cyanosis or edema   Pulses:      Skin:   Skin is warm and dry,  no rashes or palpable lesions   Neurologic:  "  no focal deficits noted      Lab Results (last 72 hours)     Procedure Component Value Units Date/Time    Basic Metabolic Panel [[id number]]  (Abnormal) Collected: 10/02/22 0526    Specimen: Blood Updated: 10/02/22 0610     Glucose 164 mg/dL      BUN 21 mg/dL      Creatinine 1.64 mg/dL      Sodium 133 mmol/L      Potassium 4.8 mmol/L      Chloride 104 mmol/L      CO2 21.0 mmol/L      Calcium 8.5 mg/dL      BUN/Creatinine Ratio 12.8     Anion Gap 8.0 mmol/L      eGFR 41.8 mL/min/1.73      Comment: National Kidney Foundation and American Society of Nephrology (ASN) Task Force recommended calculation based on the Chronic Kidney Disease Epidemiology Collaboration (CKD-EPI) equation refit without adjustment for race.       Narrative:      GFR Normal >60  Chronic Kidney Disease <60  Kidney Failure <15      CBC (No Diff) [[id number]]  (Abnormal) Collected: 10/02/22 0526    Specimen: Blood Updated: 10/02/22 0553     WBC 7.67 10*3/mm3      RBC 2.97 10*6/mm3      Hemoglobin 10.6 g/dL      Hematocrit 31.1 %      .7 fL      MCH 35.7 pg      MCHC 34.1 g/dL      RDW 13.8 %      RDW-SD 52.9 fl      MPV 9.5 fL      Platelets 70 10*3/mm3     POC Glucose Once [[id number]]  (Abnormal) Collected: 10/01/22 2103    Specimen: Blood Updated: 10/01/22 2105     Glucose 184 mg/dL      Comment: Meter: [id number] : [id number] Anthony Simmons NA       Hemoglobin A1c [[id number]]  (Normal) Collected: 10/01/22 0729    Specimen: Blood Updated: 10/01/22 2023     Hemoglobin A1C 5.10 %     Narrative:      Hemoglobin A1C Ranges:    Increased Risk for Diabetes  5.7% to 6.4%  Diabetes                     >= 6.5%  Diabetic Goal                < 7.0%    POC Glucose Once [[id number]]  (Abnormal) Collected: 10/01/22 1649    Specimen: Blood Updated: 10/01/22 1651     Glucose 284 mg/dL      Comment: Meter: [id number] : [id number] Josemanuel GOMEZ       CBC (No Diff) [[id number]]  (Abnormal) Collected: 10/01/22 0729    Specimen: Blood Updated:  10/01/22 0845     WBC 8.07 10*3/mm3      RBC 3.32 10*6/mm3      Hemoglobin 11.9 g/dL      Hematocrit 35.0 %      .4 fL      MCH 35.8 pg      MCHC 34.0 g/dL      RDW 13.7 %      RDW-SD 53.2 fl      MPV 10.1 fL      Platelets 83 10*3/mm3     Basic Metabolic Panel [[id number]]  (Abnormal) Collected: 10/01/22 0729    Specimen: Blood Updated: 10/01/22 0808     Glucose 221 mg/dL      BUN 17 mg/dL      Creatinine 1.53 mg/dL      Sodium 131 mmol/L      Potassium 4.8 mmol/L      Chloride 97 mmol/L      CO2 19.8 mmol/L      Calcium 8.6 mg/dL      BUN/Creatinine Ratio 11.1     Anion Gap 14.2 mmol/L      eGFR 45.4 mL/min/1.73      Comment: National Kidney Foundation and American Society of Nephrology (ASN) Task Force recommended calculation based on the Chronic Kidney Disease Epidemiology Collaboration (CKD-EPI) equation refit without adjustment for race.       Narrative:      GFR Normal >60  Chronic Kidney Disease <60  Kidney Failure <15      Tissue Pathology Exam [[id number]] Collected: 09/30/22 1741    Specimen: Tissue from Urinary Bladder Updated: 09/30/22 2215    POC Glucose Once [[id number]]  (Normal) Collected: 09/30/22 1841    Specimen: Blood Updated: 09/30/22 1843     Glucose 99 mg/dL      Comment: Meter: [id number] : [id number] Ann Augustin RN       POC Glucose Once [[id number]]  (Normal) Collected: 09/30/22 1334    Specimen: Blood Updated: 09/30/22 1407     Glucose 117 mg/dL      Comment: Meter: [id number] : [id number] Richar GOMEZ           Data Review:  Results from last 7 days   Lab Units 10/06/22  0904 10/05/22  0706 10/04/22  0640   SODIUM mmol/L 135* 132* 132*   POTASSIUM mmol/L 3.8 4.0 4.3   CHLORIDE mmol/L 103 102 102   CO2 mmol/L 21.0* 21.0* 18.7*   BUN mg/dL 13 20 22   CREATININE mg/dL 0.86 1.40* 1.55*   GLUCOSE mg/dL 155* 136* 134*   CALCIUM mg/dL 8.6 8.4* 8.5*     Results from last 7 days   Lab Units 10/06/22  0904 10/05/22  0706 10/04/22  0640   WBC 10*3/mm3 5.29 6.13 7.31    HEMOGLOBIN g/dL 11.2* 10.2* 11.0*   HEMATOCRIT % 31.9* 29.5* 31.8*   PLATELETS 10*3/mm3 128* 98* 84*         Results from last 7 days   Lab Units 10/01/22  0729   HEMOGLOBIN A1C % 5.10     Lab Results   Lab Value Date/Time    TROPONINT <0.010 08/01/2022 1607    TROPONINT <0.010 09/28/2021 1400    TROPONINT <0.01 01/19/2021 1303               Invalid input(s): PROT, LABALBU      Results from last 7 days   Lab Units 10/01/22  0729   HEMOGLOBIN A1C % 5.10     Glucose   Date/Time Value Ref Range Status   10/06/2022 1208 182 (H) 70 - 130 mg/dL Final     Comment:     Meter: [id number] : [id number] Macho Bui NA   10/06/2022 0758 129 70 - 130 mg/dL Final     Comment:     Meter: [id number] : [id number] Macho Bui NA   10/05/2022 1708 155 (H) 70 - 130 mg/dL Final     Comment:     Meter: [id number] : [id number] Macho Bui NA   10/05/2022 1203 127 70 - 130 mg/dL Final     Comment:     Meter: [id number] : [id number] Macho Bui NA   10/05/2022 0743 133 (H) 70 - 130 mg/dL Final     Comment:     Meter: [id number] : [id number] Macho Bui NA   10/04/2022 1537 186 (H) 70 - 130 mg/dL Final     Comment:     Meter: [id number] : [id number] Livingston Marilauisa NA   10/04/2022 1058 197 (H) 70 - 130 mg/dL Final     Comment:     Meter: [id number] : [id number]   10/04/2022 0734 137 (H) 70 - 130 mg/dL Final     Comment:     Meter: [id number] : [id number]           Past Medical History:   Diagnosis Date   • Aneurysm of aorta (HCC)    • Anxiety and depression    • Arthritis    • BPH (benign prostatic hyperplasia)    • Chronic UTI    • Cirrhosis (HCC)    • Diabetes mellitus (HCC)    • H/O esophageal varices    • Hard of hearing    • History of frequent urinary tract infections    • History of MI (myocardial infarction)     STATES WAS TOLD HE HAD IN PAST, NEVER SAW CARDIOLOGY   • Hypertension    • Pulmonary nodules    • Self-catheterizes urinary bladder        Assessment:  Active Hospital Problems     Diagnosis  POA   • **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       Plan:  Continue with current RX. Follow lab. Glycemic control.  Got nephrostomy tube.    Austyn Velazco MD  10/6/2022  14:25 EDT

## 2022-10-06 NOTE — PLAN OF CARE
Goal Outcome Evaluation:  Plan of Care Reviewed With: patient, daughter        Progress: no change  Outcome Evaluation: Pt in bed at beg of PT session. Pt sat up to EOB w/ SBA and was mod I at EOB. Pt amb 400' req SV and use of IV pole in R hand. Pt attempted bal activities, however needed to keep B hands on wall rail for both very brief SLS and lateral amb. Pt at EOB w/ RN and MD at end of session. Pt amb reg w/ staff. PT will s/o at this time.    Patient was intermittently wearing a face mask during this therapy encounter. Therapist used appropriate personal protective equipment including mask and gloves.  Mask used was standard procedure mask. Appropriate PPE was worn during the entire therapy session. Hand hygiene was completed before and after therapy session. Patient is not in enhanced droplet precautions.

## 2022-10-06 NOTE — PLAN OF CARE
Goal Outcome Evaluation:           Progress: improving  Outcome Evaluation: VSS, patient c/o pain at times treated with prn pain medication, neph tube draining reddish output, suprapubic draining pinkish red output. Minimal drainage to the dressing on midline comparied to last two days. Daughter at bedside. No verbal complaints at this time

## 2022-10-07 ENCOUNTER — READMISSION MANAGEMENT (OUTPATIENT)
Dept: CALL CENTER | Facility: HOSPITAL | Age: 82
End: 2022-10-07

## 2022-10-07 VITALS
RESPIRATION RATE: 18 BRPM | WEIGHT: 220.46 LBS | SYSTOLIC BLOOD PRESSURE: 132 MMHG | DIASTOLIC BLOOD PRESSURE: 76 MMHG | BODY MASS INDEX: 32.65 KG/M2 | HEIGHT: 69 IN | TEMPERATURE: 97.6 F | HEART RATE: 84 BPM | OXYGEN SATURATION: 97 %

## 2022-10-07 LAB
ANION GAP SERPL CALCULATED.3IONS-SCNC: 7.1 MMOL/L (ref 5–15)
BUN SERPL-MCNC: 9 MG/DL (ref 8–23)
BUN/CREAT SERPL: 11.8 (ref 7–25)
CALCIUM SPEC-SCNC: 8.4 MG/DL (ref 8.6–10.5)
CHLORIDE SERPL-SCNC: 104 MMOL/L (ref 98–107)
CO2 SERPL-SCNC: 24.9 MMOL/L (ref 22–29)
CREAT SERPL-MCNC: 0.76 MG/DL (ref 0.76–1.27)
DEPRECATED RDW RBC AUTO: 52.4 FL (ref 37–54)
EGFRCR SERPLBLD CKD-EPI 2021: 90.3 ML/MIN/1.73
ERYTHROCYTE [DISTWIDTH] IN BLOOD BY AUTOMATED COUNT: 13.9 % (ref 12.3–15.4)
GLUCOSE BLDC GLUCOMTR-MCNC: 136 MG/DL (ref 70–130)
GLUCOSE BLDC GLUCOMTR-MCNC: 138 MG/DL (ref 70–130)
GLUCOSE BLDC GLUCOMTR-MCNC: 196 MG/DL (ref 70–130)
GLUCOSE SERPL-MCNC: 129 MG/DL (ref 65–99)
HCT VFR BLD AUTO: 29.7 % (ref 37.5–51)
HGB BLD-MCNC: 10.4 G/DL (ref 13–17.7)
MCH RBC QN AUTO: 36.4 PG (ref 26.6–33)
MCHC RBC AUTO-ENTMCNC: 35 G/DL (ref 31.5–35.7)
MCV RBC AUTO: 103.8 FL (ref 79–97)
PLATELET # BLD AUTO: 111 10*3/MM3 (ref 140–450)
PMV BLD AUTO: 9.3 FL (ref 6–12)
POTASSIUM SERPL-SCNC: 3.8 MMOL/L (ref 3.5–5.2)
RBC # BLD AUTO: 2.86 10*6/MM3 (ref 4.14–5.8)
SODIUM SERPL-SCNC: 136 MMOL/L (ref 136–145)
WBC NRBC COR # BLD: 4.61 10*3/MM3 (ref 3.4–10.8)

## 2022-10-07 PROCEDURE — 80048 BASIC METABOLIC PNL TOTAL CA: CPT | Performed by: UROLOGY

## 2022-10-07 PROCEDURE — 25010000002 CEFTRIAXONE PER 250 MG: Performed by: UROLOGY

## 2022-10-07 PROCEDURE — 82962 GLUCOSE BLOOD TEST: CPT

## 2022-10-07 PROCEDURE — 63710000001 INSULIN LISPRO (HUMAN) PER 5 UNITS: Performed by: HOSPITALIST

## 2022-10-07 PROCEDURE — 85027 COMPLETE CBC AUTOMATED: CPT | Performed by: UROLOGY

## 2022-10-07 RX ORDER — OXYCODONE AND ACETAMINOPHEN 7.5; 325 MG/1; MG/1
1 TABLET ORAL EVERY 4 HOURS PRN
Qty: 30 TABLET | Refills: 0 | Status: SHIPPED | OUTPATIENT
Start: 2022-10-07

## 2022-10-07 RX ORDER — AMOXICILLIN 250 MG
2 CAPSULE ORAL DAILY
Qty: 60 TABLET | Refills: 0 | Status: SHIPPED | OUTPATIENT
Start: 2022-10-07 | End: 2022-11-06

## 2022-10-07 RX ORDER — CEPHALEXIN 500 MG/1
500 CAPSULE ORAL 3 TIMES DAILY
Qty: 30 CAPSULE | Refills: 0 | Status: SHIPPED | OUTPATIENT
Start: 2022-10-07 | End: 2022-10-17

## 2022-10-07 RX ADMIN — ALPRAZOLAM 0.25 MG: 0.25 TABLET ORAL at 10:30

## 2022-10-07 RX ADMIN — CARIPRAZINE 4.5 MG: 4.5 CAPSULE, GELATIN COATED ORAL at 18:38

## 2022-10-07 RX ADMIN — PIOGLITAZONE HYDROCHLORIDE 30 MG: 30 TABLET ORAL at 10:30

## 2022-10-07 RX ADMIN — Medication 3 ML: at 10:30

## 2022-10-07 RX ADMIN — FINASTERIDE 5 MG: 5 TABLET, FILM COATED ORAL at 10:30

## 2022-10-07 RX ADMIN — INSULIN LISPRO 2 UNITS: 100 INJECTION, SOLUTION INTRAVENOUS; SUBCUTANEOUS at 18:37

## 2022-10-07 RX ADMIN — DOCUSATE SODIUM 50MG AND SENNOSIDES 8.6MG 2 TABLET: 8.6; 5 TABLET, FILM COATED ORAL at 10:30

## 2022-10-07 RX ADMIN — OXYCODONE HYDROCHLORIDE AND ACETAMINOPHEN 1 TABLET: 7.5; 325 TABLET ORAL at 04:29

## 2022-10-07 RX ADMIN — CEFTRIAXONE SODIUM 1 G: 1 INJECTION, POWDER, FOR SOLUTION INTRAMUSCULAR; INTRAVENOUS at 10:30

## 2022-10-07 NOTE — PROGRESS NOTES
"DAILY PROGRESS NOTE  [facility]    Patient Identification:  Name: Teto Castle  Age: 82 y.o.  Sex: male  :  1940  MRN: [id number]         Primary Care Physician: Cam Dickey MD    Subjective:  Interval History:He complains of pain.  Got nephrostomy tube.    Objective:    Scheduled Meds:Cariprazine HCl, 4.5 mg, Oral, Q PM  finasteride, 5 mg, Oral, Daily  insulin lispro, 0-7 Units, Subcutaneous, TID AC  melatonin, 5 mg, Oral, Nightly  pioglitazone, 30 mg, Oral, Daily  senna-docusate sodium, 2 tablet, Oral, BID  sodium chloride, 3 mL, Intravenous, Q12H      Continuous Infusions:     Vital signs in last 24 hours:  Temp:  [96.9 °F (36.1 °C)-97.6 °F (36.4 °C)] 97 °F (36.1 °C)  Heart Rate:  [80-84] 81  Resp:  [16-18] 18  BP: (100-130)/(61-79) 120/68    Intake/Output:    Intake/Output Summary (Last 24 hours) at 10/7/2022 1438  Last data filed at 10/7/2022 1026  Gross per 24 hour   Intake 1300 ml   Output 2200 ml   Net -900 ml       Exam:  /68 (BP Location: Left arm, Patient Position: Lying)   Pulse 81   Temp 97 °F (36.1 °C) (Oral)   Resp 18   Ht 175.3 cm (69.02\")   Wt 100 kg (220 lb 7.4 oz)   SpO2 97%   BMI 32.54 kg/m²     General Appearance:    Alert, cooperative, no distress   Head:    Normocephalic, without obvious abnormality, atraumatic   Eyes:       Throat:   Lips, tongue, gums normal   Neck:   Supple, symmetrical, trachea midline, no JVD   Lungs:     Clear to auscultation bilaterally, respirations unlabored   Chest Wall:    No tenderness or deformity    Heart:    Regular rate and rhythm, S1 and S2 normal, no murmur,no  Rub or gallop   Abdomen:     Soft, nontender, bowel sounds active, no masses, no organomegaly    Extremities:   Extremities normal, atraumatic, no cyanosis or edema   Pulses:      Skin:   Skin is warm and dry,  no rashes or palpable lesions   Neurologic:   no focal deficits noted      Lab Results (last 72 hours)     Procedure Component Value Units " Date/Time    Basic Metabolic Panel [[id number]]  (Abnormal) Collected: 10/02/22 0526    Specimen: Blood Updated: 10/02/22 0610     Glucose 164 mg/dL      BUN 21 mg/dL      Creatinine 1.64 mg/dL      Sodium 133 mmol/L      Potassium 4.8 mmol/L      Chloride 104 mmol/L      CO2 21.0 mmol/L      Calcium 8.5 mg/dL      BUN/Creatinine Ratio 12.8     Anion Gap 8.0 mmol/L      eGFR 41.8 mL/min/1.73      Comment: National Kidney Foundation and American Society of Nephrology (ASN) Task Force recommended calculation based on the Chronic Kidney Disease Epidemiology Collaboration (CKD-EPI) equation refit without adjustment for race.       Narrative:      GFR Normal >60  Chronic Kidney Disease <60  Kidney Failure <15      CBC (No Diff) [[id number]]  (Abnormal) Collected: 10/02/22 0526    Specimen: Blood Updated: 10/02/22 0553     WBC 7.67 10*3/mm3      RBC 2.97 10*6/mm3      Hemoglobin 10.6 g/dL      Hematocrit 31.1 %      .7 fL      MCH 35.7 pg      MCHC 34.1 g/dL      RDW 13.8 %      RDW-SD 52.9 fl      MPV 9.5 fL      Platelets 70 10*3/mm3     POC Glucose Once [[id number]]  (Abnormal) Collected: 10/01/22 2103    Specimen: Blood Updated: 10/01/22 2105     Glucose 184 mg/dL      Comment: Meter: [id number] : [id number] Anthony Desiraealeksey GOMEZ       Hemoglobin A1c [[id number]]  (Normal) Collected: 10/01/22 0729    Specimen: Blood Updated: 10/01/22 2023     Hemoglobin A1C 5.10 %     Narrative:      Hemoglobin A1C Ranges:    Increased Risk for Diabetes  5.7% to 6.4%  Diabetes                     >= 6.5%  Diabetic Goal                < 7.0%    POC Glucose Once [[id number]]  (Abnormal) Collected: 10/01/22 1649    Specimen: Blood Updated: 10/01/22 1651     Glucose 284 mg/dL      Comment: Meter: [id number] : [id number] Josemanuel GOMEZ       CBC (No Diff) [[id number]]  (Abnormal) Collected: 10/01/22 0729    Specimen: Blood Updated: 10/01/22 0845     WBC 8.07 10*3/mm3      RBC 3.32 10*6/mm3      Hemoglobin 11.9 g/dL       Hematocrit 35.0 %      .4 fL      MCH 35.8 pg      MCHC 34.0 g/dL      RDW 13.7 %      RDW-SD 53.2 fl      MPV 10.1 fL      Platelets 83 10*3/mm3     Basic Metabolic Panel [[id number]]  (Abnormal) Collected: 10/01/22 0729    Specimen: Blood Updated: 10/01/22 0808     Glucose 221 mg/dL      BUN 17 mg/dL      Creatinine 1.53 mg/dL      Sodium 131 mmol/L      Potassium 4.8 mmol/L      Chloride 97 mmol/L      CO2 19.8 mmol/L      Calcium 8.6 mg/dL      BUN/Creatinine Ratio 11.1     Anion Gap 14.2 mmol/L      eGFR 45.4 mL/min/1.73      Comment: National Kidney Foundation and American Society of Nephrology (ASN) Task Force recommended calculation based on the Chronic Kidney Disease Epidemiology Collaboration (CKD-EPI) equation refit without adjustment for race.       Narrative:      GFR Normal >60  Chronic Kidney Disease <60  Kidney Failure <15      Tissue Pathology Exam [[id number]] Collected: 09/30/22 1741    Specimen: Tissue from Urinary Bladder Updated: 09/30/22 2215    POC Glucose Once [[id number]]  (Normal) Collected: 09/30/22 1841    Specimen: Blood Updated: 09/30/22 1843     Glucose 99 mg/dL      Comment: Meter: [id number] : [id number] Ann Augustin RN       POC Glucose Once [[id number]]  (Normal) Collected: 09/30/22 1334    Specimen: Blood Updated: 09/30/22 1407     Glucose 117 mg/dL      Comment: Meter: [id number] : [id number] Richar GOMEZ           Data Review:  Results from last 7 days   Lab Units 10/07/22  0745 10/06/22  0904 10/05/22  0706   SODIUM mmol/L 136 135* 132*   POTASSIUM mmol/L 3.8 3.8 4.0   CHLORIDE mmol/L 104 103 102   CO2 mmol/L 24.9 21.0* 21.0*   BUN mg/dL 9 13 20   CREATININE mg/dL 0.76 0.86 1.40*   GLUCOSE mg/dL 129* 155* 136*   CALCIUM mg/dL 8.4* 8.6 8.4*     Results from last 7 days   Lab Units 10/07/22  0745 10/06/22  0904 10/05/22  0706   WBC 10*3/mm3 4.61 5.29 6.13   HEMOGLOBIN g/dL 10.4* 11.2* 10.2*   HEMATOCRIT % 29.7* 31.9* 29.5*   PLATELETS 10*3/mm3 111* 128*  98*         Results from last 7 days   Lab Units 10/01/22  0729   HEMOGLOBIN A1C % 5.10     Lab Results   Lab Value Date/Time    TROPONINT <0.010 08/01/2022 1607    TROPONINT <0.010 09/28/2021 1400    TROPONINT <0.01 01/19/2021 1303               Invalid input(s): PROT, LABALBU      Results from last 7 days   Lab Units 10/01/22  0729   HEMOGLOBIN A1C % 5.10     Glucose   Date/Time Value Ref Range Status   10/07/2022 1204 138 (H) 70 - 130 mg/dL Final     Comment:     Meter: [id number] : [id number] Abdirahman Vieira NA   10/07/2022 0757 136 (H) 70 - 130 mg/dL Final     Comment:     Meter: [id number] : [id number] Abdirahman Vieira NA   10/06/2022 2005 210 (H) 70 - 130 mg/dL Final     Comment:     Meter: [id number] : [id number] Martines Cha NA   10/06/2022 1653 191 (H) 70 - 130 mg/dL Final     Comment:     Meter: [id number] : [id number] Macho Rezan NA   10/06/2022 1208 182 (H) 70 - 130 mg/dL Final     Comment:     Meter: [id number] : [id number] Macho Ramya NA   10/06/2022 0758 129 70 - 130 mg/dL Final     Comment:     Meter: [id number] : [id number] Macho Ramya NA   10/05/2022 1708 155 (H) 70 - 130 mg/dL Final     Comment:     Meter: [id number] : [id number] Macho Ramya NA   10/05/2022 1203 127 70 - 130 mg/dL Final     Comment:     Meter: [id number] : [id number] Macho Ramya NA           Past Medical History:   Diagnosis Date   • Aneurysm of aorta (HCC)    • Anxiety and depression    • Arthritis    • BPH (benign prostatic hyperplasia)    • Chronic UTI    • Cirrhosis (HCC)    • Diabetes mellitus (HCC)    • H/O esophageal varices    • Hard of hearing    • History of frequent urinary tract infections    • History of MI (myocardial infarction)     STATES WAS TOLD HE HAD IN PAST, NEVER SAW CARDIOLOGY   • Hypertension    • Pulmonary nodules    • Self-catheterizes urinary bladder        Assessment:  Active Hospital Problems    Diagnosis  POA   • **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       Plan:  Continue with current RX. Follow lab. Glycemic control.  Got nephrostomy tube.  DC planning. To [facility].    Austyn Velazco MD  10/7/2022  14:38 EDT

## 2022-10-07 NOTE — CASE MANAGEMENT/SOCIAL WORK
Continued Stay Note  [facility]     Patient Name: Teto Castle  MRN: [id number]  Today's Date: 10/7/2022    Admit Date: 9/30/2022    Plan: [facility], pending acceptance from facility   Discharge Plan     Row Name 10/07/22 1617       Plan    Plan [facility], pending acceptance from facility    Patient/Family in Agreement with Plan yes    Plan Comments CCP faxed paperwork to [facility] this date at 4:17pm.               Discharge Codes    No documentation.               Expected Discharge Date and Time     Expected Discharge Date Expected Discharge Time    Oct 7, 2022

## 2022-10-07 NOTE — PLAN OF CARE
Goal Outcome Evaluation:               Vitals stable, room air, up with assist x1 with walker. Midline dressing clean and dry.  Suprapubic catheter draining red tinged urine.  Right Nephrostomy tube draining red.  Patient has complained of pain this shift that was tolerable with oral pain medication.  See MAR for all medication administered.  Daughter at bedside.  Safety precautions in use.  Will continue to monitor.    no

## 2022-10-07 NOTE — CASE MANAGEMENT/SOCIAL WORK
Continued Stay Note  [facility]     Patient Name: Teto Castle  MRN: [id number]  Today's Date: 10/7/2022    Admit Date: 9/30/2022    Plan: [facility], [facility] pending doctor signature on paperwork and acceptance from facility   Discharge Plan     Row Name 10/07/22 1232       Plan    Plan [facility], correction pending doctor signature on paperwork and acceptance from facility    Patient/Family in Agreement with Plan yes    Plan Comments CCP spoke with patient's nurse Saida due to still needing a physican to sign the medical physican form for the correction patinet is wanting to go to at discharge. Once form has been signed by a physican, facility will need to review and approve admission.               Discharge Codes    No documentation.               Expected Discharge Date and Time     Expected Discharge Date Expected Discharge Time    Oct 7, 2022

## 2022-10-08 LAB
BACTERIA FLD CULT: NORMAL
GRAM STN SPEC: NORMAL

## 2022-10-08 NOTE — OUTREACH NOTE
Prep Survey    Flowsheet Row Responses   [facility] facility patient discharged from? [facility]   Is LACE score < 7 ? No   Emergency Room discharge w/ pulse ox? No   Eligibility Readm Mgmt   Discharge diagnosis bladder diverticulum  Bladder DiverticulectomyN/   Does the patient have one of the following disease processes/diagnoses(primary or secondary)? Other   Does the patient have Home health ordered? No   Is there a DME ordered? No   General alerts for this patient [facility], [facility]    Prep survey completed? Yes          JASON MARKHAM - Registered Nurse

## 2022-10-12 ENCOUNTER — READMISSION MANAGEMENT (OUTPATIENT)
Dept: CALL CENTER | Facility: HOSPITAL | Age: 82
End: 2022-10-12

## 2022-10-12 NOTE — PROGRESS NOTES
"Enter Query Response Below      Query Response:   Complication of surgery  * No surgeons listed * Electronically signed by Art Dickerson MD, 10/12/22, 6:13 PM EDT.               If applicable, please update the problem list.        Patient: Teto Castle        : 1940  Account: [id number]           Admit Date: 2022        How to Respond to this query:       a. Click New Note     b. Answer query within the yellow box.                c. Update the Problem List, if applicable.      If you have any questions about this query contact me at: [email or web address]     Dr. Dickerson    Patient with history of recurrent urinary tract infections underwent bladder diverticulectomy and suprapubic catheter insertion. CT imaging showed extravasation of left distal ureter with urinoma.   Progress Note on 10/05 states \"partial cystectomy for large bladder diverticulum now with iatrogenic ureteral injury and developing urinoma.\" Nephrostomy tube placed in Interventional Radiology.     Progress Note 10/6- \"Left ureteral injury with extravasation s/p neph tube\"  Discharge Summary states patient \"presented with bladder diverticulum and underwent diveriticuclectomy and bladder repair. Sp tube placement. Creat elevated and ct imaging showed extravasion of the left distal ureter with urinoma. Neph tube placed on POD#5 and creat improved. Now will be discharged with neph tube and sp catheter.\"     After study, please clarify if patient's ureteral injury was    not a complication of surgery    complication of surgery    other (please specify)________________    clinically unable to determine         By submitting this query, we are merely seeking further clarification of documentation to accurately reflect all conditions that you are monitoring, evaluating, treating or that extend the hospitalization or utilize additional resources of care. Please utilize your independent clinical judgment when addressing the " question(s) above.     This query and your response, once completed, will be entered into the legal medical record.    Sincerely,  Marycruz MOORE RN CDI CCDS  [email or web address]   Clinical Documentation Integrity Program

## 2022-10-18 ENCOUNTER — READMISSION MANAGEMENT (OUTPATIENT)
Dept: CALL CENTER | Facility: HOSPITAL | Age: 82
End: 2022-10-18

## 2022-10-18 NOTE — OUTREACH NOTE
Medical Week 2 Survey    Flowsheet Row Responses   [facility] patient discharged from? [place]   Does the patient have one of the following disease processes/diagnoses(primary or secondary)? Other   Week 2 attempt successful? No   Unsuccessful attempts Attempt 1          ANSHUL RIVERS - Registered Nurse

## 2022-10-20 ENCOUNTER — READMISSION MANAGEMENT (OUTPATIENT)
Dept: CALL CENTER | Facility: HOSPITAL | Age: 82
End: 2022-10-20

## 2022-10-20 ENCOUNTER — TRANSCRIBE ORDERS (OUTPATIENT)
Dept: ADMINISTRATIVE | Facility: HOSPITAL | Age: 82
End: 2022-10-20

## 2022-10-20 ENCOUNTER — HOSPITAL ENCOUNTER (INPATIENT)
Facility: HOSPITAL | Age: 82
LOS: 5 days | Discharge: HOME | End: 2022-11-01
Attending: EMERGENCY MEDICINE | Admitting: HOSPITALIST

## 2022-10-20 ENCOUNTER — APPOINTMENT (OUTPATIENT)
Dept: CT IMAGING | Facility: HOSPITAL | Age: 82
End: 2022-10-20

## 2022-10-20 DIAGNOSIS — R18.8 CIRRHOSIS OF LIVER WITH ASCITES, UNSPECIFIED HEPATIC CIRRHOSIS TYPE: ICD-10-CM

## 2022-10-20 DIAGNOSIS — E11.9 TYPE 2 DIABETES MELLITUS WITHOUT COMPLICATION, UNSPECIFIED WHETHER LONG TERM INSULIN USE: ICD-10-CM

## 2022-10-20 DIAGNOSIS — Z98.890 S/P UROLOGICAL SURGERY: ICD-10-CM

## 2022-10-20 DIAGNOSIS — K70.30 ALCOHOLIC CIRRHOSIS, UNSPECIFIED WHETHER ASCITES PRESENT: Primary | ICD-10-CM

## 2022-10-20 DIAGNOSIS — I10 HYPERTENSION, UNSPECIFIED TYPE: ICD-10-CM

## 2022-10-20 DIAGNOSIS — R53.1 GENERALIZED WEAKNESS: Primary | ICD-10-CM

## 2022-10-20 DIAGNOSIS — K74.60 CIRRHOSIS OF LIVER WITH ASCITES, UNSPECIFIED HEPATIC CIRRHOSIS TYPE: ICD-10-CM

## 2022-10-20 LAB
ALBUMIN SERPL-MCNC: 3.2 G/DL (ref 3.5–5.2)
ALBUMIN/GLOB SERPL: 1.3 G/DL
ALP SERPL-CCNC: 105 U/L (ref 39–117)
ALT SERPL W P-5'-P-CCNC: 28 U/L (ref 1–41)
AMMONIA BLD-SCNC: 58 UMOL/L (ref 16–60)
ANION GAP SERPL CALCULATED.3IONS-SCNC: 11.9 MMOL/L (ref 5–15)
AST SERPL-CCNC: 64 U/L (ref 1–40)
BACTERIA UR QL AUTO: ABNORMAL /HPF
BASOPHILS # BLD AUTO: 0.05 10*3/MM3 (ref 0–0.2)
BASOPHILS NFR BLD AUTO: 0.8 % (ref 0–1.5)
BILIRUB SERPL-MCNC: 3.1 MG/DL (ref 0–1.2)
BILIRUB UR QL STRIP: NEGATIVE
BUN SERPL-MCNC: 7 MG/DL (ref 8–23)
BUN/CREAT SERPL: 8.3 (ref 7–25)
CALCIUM SPEC-SCNC: 8.6 MG/DL (ref 8.6–10.5)
CHLORIDE SERPL-SCNC: 93 MMOL/L (ref 98–107)
CLARITY UR: ABNORMAL
CO2 SERPL-SCNC: 26.1 MMOL/L (ref 22–29)
COLOR UR: ABNORMAL
CREAT SERPL-MCNC: 0.84 MG/DL (ref 0.76–1.27)
DEPRECATED RDW RBC AUTO: 50.1 FL (ref 37–54)
EGFRCR SERPLBLD CKD-EPI 2021: 87.6 ML/MIN/1.73
EOSINOPHIL # BLD AUTO: 0.32 10*3/MM3 (ref 0–0.4)
EOSINOPHIL NFR BLD AUTO: 5.3 % (ref 0.3–6.2)
ERYTHROCYTE [DISTWIDTH] IN BLOOD BY AUTOMATED COUNT: 13.7 % (ref 12.3–15.4)
GLOBULIN UR ELPH-MCNC: 2.4 GM/DL
GLUCOSE SERPL-MCNC: 144 MG/DL (ref 65–99)
GLUCOSE UR STRIP-MCNC: NEGATIVE MG/DL
HCT VFR BLD AUTO: 31.3 % (ref 37.5–51)
HGB BLD-MCNC: 11.3 G/DL (ref 13–17.7)
HGB UR QL STRIP.AUTO: ABNORMAL
HYALINE CASTS UR QL AUTO: ABNORMAL /LPF
IMM GRANULOCYTES # BLD AUTO: 0.02 10*3/MM3 (ref 0–0.05)
IMM GRANULOCYTES NFR BLD AUTO: 0.3 % (ref 0–0.5)
INR PPP: 1.11 (ref 0.9–1.1)
KETONES UR QL STRIP: NEGATIVE
LEUKOCYTE ESTERASE UR QL STRIP.AUTO: ABNORMAL
LIPASE SERPL-CCNC: 29 U/L (ref 13–60)
LYMPHOCYTES # BLD AUTO: 1.46 10*3/MM3 (ref 0.7–3.1)
LYMPHOCYTES NFR BLD AUTO: 24.3 % (ref 19.6–45.3)
MCH RBC QN AUTO: 35.9 PG (ref 26.6–33)
MCHC RBC AUTO-ENTMCNC: 36.1 G/DL (ref 31.5–35.7)
MCV RBC AUTO: 99.4 FL (ref 79–97)
MONOCYTES # BLD AUTO: 0.66 10*3/MM3 (ref 0.1–0.9)
MONOCYTES NFR BLD AUTO: 11 % (ref 5–12)
NEUTROPHILS NFR BLD AUTO: 3.51 10*3/MM3 (ref 1.7–7)
NEUTROPHILS NFR BLD AUTO: 58.3 % (ref 42.7–76)
NITRITE UR QL STRIP: NEGATIVE
NRBC BLD AUTO-RTO: 0.2 /100 WBC (ref 0–0.2)
PH UR STRIP.AUTO: 6.5 [PH] (ref 5–8)
PLATELET # BLD AUTO: 151 10*3/MM3 (ref 140–450)
PMV BLD AUTO: 8.5 FL (ref 6–12)
POTASSIUM SERPL-SCNC: 4 MMOL/L (ref 3.5–5.2)
PROT SERPL-MCNC: 5.6 G/DL (ref 6–8.5)
PROT UR QL STRIP: ABNORMAL
PROTHROMBIN TIME: 14.4 SECONDS (ref 11.7–14.2)
RBC # BLD AUTO: 3.15 10*6/MM3 (ref 4.14–5.8)
RBC # UR STRIP: ABNORMAL /HPF
REF LAB TEST METHOD: ABNORMAL
SODIUM SERPL-SCNC: 131 MMOL/L (ref 136–145)
SP GR UR STRIP: 1.01 (ref 1–1.03)
SQUAMOUS #/AREA URNS HPF: ABNORMAL /HPF
UROBILINOGEN UR QL STRIP: ABNORMAL
WBC # UR STRIP: ABNORMAL /HPF
WBC CLUMPS # UR AUTO: ABNORMAL /HPF
WBC NRBC COR # BLD: 6.02 10*3/MM3 (ref 3.4–10.8)

## 2022-10-20 PROCEDURE — 83735 ASSAY OF MAGNESIUM: CPT | Performed by: PHYSICIAN ASSISTANT

## 2022-10-20 PROCEDURE — 81001 URINALYSIS AUTO W/SCOPE: CPT | Performed by: PHYSICIAN ASSISTANT

## 2022-10-20 PROCEDURE — 83690 ASSAY OF LIPASE: CPT | Performed by: PHYSICIAN ASSISTANT

## 2022-10-20 PROCEDURE — 80053 COMPREHEN METABOLIC PANEL: CPT | Performed by: PHYSICIAN ASSISTANT

## 2022-10-20 PROCEDURE — 25010000002 ONDANSETRON PER 1 MG: Performed by: EMERGENCY MEDICINE

## 2022-10-20 PROCEDURE — 74177 CT ABD & PELVIS W/CONTRAST: CPT

## 2022-10-20 PROCEDURE — 25010000002 HYDROMORPHONE PER 4 MG: Performed by: EMERGENCY MEDICINE

## 2022-10-20 PROCEDURE — 85610 PROTHROMBIN TIME: CPT | Performed by: PHYSICIAN ASSISTANT

## 2022-10-20 PROCEDURE — 82140 ASSAY OF AMMONIA: CPT | Performed by: PHYSICIAN ASSISTANT

## 2022-10-20 PROCEDURE — 99285 EMERGENCY DEPT VISIT HI MDM: CPT

## 2022-10-20 PROCEDURE — 85025 COMPLETE CBC W/AUTO DIFF WBC: CPT | Performed by: PHYSICIAN ASSISTANT

## 2022-10-20 RX ORDER — ONDANSETRON 2 MG/ML
4 INJECTION INTRAMUSCULAR; INTRAVENOUS ONCE
Status: COMPLETED | OUTPATIENT
Start: 2022-10-20 | End: 2022-10-20

## 2022-10-20 RX ORDER — HYDROMORPHONE HYDROCHLORIDE 1 MG/ML
0.5 INJECTION, SOLUTION INTRAMUSCULAR; INTRAVENOUS; SUBCUTANEOUS ONCE
Status: COMPLETED | OUTPATIENT
Start: 2022-10-20 | End: 2022-10-20

## 2022-10-20 RX ORDER — SODIUM CHLORIDE 0.9 % (FLUSH) 0.9 %
10 SYRINGE (ML) INJECTION AS NEEDED
Status: DISCONTINUED | OUTPATIENT
Start: 2022-10-20 | End: 2022-11-01 | Stop reason: HOSPADM

## 2022-10-20 RX ADMIN — HYDROMORPHONE HYDROCHLORIDE 0.5 MG: 1 INJECTION, SOLUTION INTRAMUSCULAR; INTRAVENOUS; SUBCUTANEOUS at 23:19

## 2022-10-20 RX ADMIN — ONDANSETRON 4 MG: 2 INJECTION INTRAMUSCULAR; INTRAVENOUS at 23:19

## 2022-10-20 NOTE — OUTREACH NOTE
Medical Week 2 Survey    Flowsheet Row Responses   [facility] patient discharged from? [place]   Does the patient have one of the following disease processes/diagnoses(primary or secondary)? Other   Week 2 attempt successful? No   Unsuccessful attempts Attempt 2   Revoke Decline to participate          SHIV YU - Registered Nurse

## 2022-10-21 ENCOUNTER — APPOINTMENT (OUTPATIENT)
Dept: ULTRASOUND IMAGING | Facility: HOSPITAL | Age: 82
End: 2022-10-21

## 2022-10-21 ENCOUNTER — APPOINTMENT (OUTPATIENT)
Dept: CARDIOLOGY | Facility: HOSPITAL | Age: 82
End: 2022-10-21

## 2022-10-21 PROBLEM — N39.0 UTI (URINARY TRACT INFECTION) DUE TO URINARY INDWELLING CATHETER (HCC): Status: ACTIVE | Noted: 2022-10-21

## 2022-10-21 PROBLEM — E87.1 HYPONATREMIA: Status: ACTIVE | Noted: 2022-10-21

## 2022-10-21 PROBLEM — R53.1 GENERALIZED WEAKNESS: Status: ACTIVE | Noted: 2022-10-21

## 2022-10-21 PROBLEM — N40.1 BPH WITH OBSTRUCTION/LOWER URINARY TRACT SYMPTOMS: Status: ACTIVE | Noted: 2022-10-21

## 2022-10-21 PROBLEM — R18.8 ASCITES: Status: ACTIVE | Noted: 2022-10-21

## 2022-10-21 PROBLEM — D63.8 ANEMIA, CHRONIC DISEASE: Status: ACTIVE | Noted: 2022-10-21

## 2022-10-21 PROBLEM — N13.8 BPH WITH OBSTRUCTION/LOWER URINARY TRACT SYMPTOMS: Status: ACTIVE | Noted: 2022-10-21

## 2022-10-21 PROBLEM — G93.41 METABOLIC ENCEPHALOPATHY: Status: ACTIVE | Noted: 2022-10-21

## 2022-10-21 PROBLEM — T83.511A UTI (URINARY TRACT INFECTION) DUE TO URINARY INDWELLING CATHETER: Status: ACTIVE | Noted: 2022-10-21

## 2022-10-21 PROBLEM — K76.6 PORTAL HYPERTENSION (HCC): Status: ACTIVE | Noted: 2022-10-21

## 2022-10-21 LAB
ALBUMIN FLD-MCNC: 0.3 G/DL
ALPHA-FETOPROTEIN: 201 NG/ML (ref 0–8.3)
ANION GAP SERPL CALCULATED.3IONS-SCNC: 6.6 MMOL/L (ref 5–15)
APPEARANCE FLD: ABNORMAL
BASOPHILS # BLD AUTO: 0.05 10*3/MM3 (ref 0–0.2)
BASOPHILS NFR BLD AUTO: 1 % (ref 0–1.5)
BH CV VAS HEPATIC PORTAL SPLEEN LENGTH: 12 CM
BH CV VAS HEPATIC PORTAL VEIN DIAMETER: 1.29 CM
BH CV VAS HEPATOPORTAL HEPATIC V LT DIRECTION: NORMAL
BH CV VAS HEPATOPORTAL HEPATIC V MID DIRECTION: NORMAL
BH CV VAS HEPATOPORTAL HEPATIC V RT DIRECTION: NORMAL
BH CV VAS HEPATOPORTAL IVC CONFLUENCE FLOW: NORMAL
BH CV VAS HEPATOPORTAL IVC CONFLUENCE SPONT: NORMAL
BH CV VAS HEPATOPORTAL IVC FLOW: NORMAL
BH CV VAS HEPATOPORTAL IVC SPONT: NORMAL
BH CV VAS HEPATOPORTAL PORTAL V EXTRAHEPATIC DIRECTION: NORMAL
BH CV VAS HEPATOPORTAL PORTAL V LT INTRA DIRECTION: NORMAL
BH CV VAS HEPATOPORTAL PORTAL V MAIN INTRA DIRECTION: NORMAL
BH CV VAS HEPATOPORTAL PORTAL V PSV: 20 CM/S
BH CV VAS HEPATOPORTAL SPLENIC DIRECTION: NORMAL
BH CV VAS SMA HEPATIC EDV: 21.7 CM/S
BH CV VAS SMA HEPATIC PSV: 174 CM/S
BH CV VAS SMA SPLENIC EDV: 22 CM/S
BH CV VAS SMA SPLENIC PSV: 155 CM/S
BUN SERPL-MCNC: 6 MG/DL (ref 8–23)
BUN/CREAT SERPL: 7.2 (ref 7–25)
CALCIUM SPEC-SCNC: 8.2 MG/DL (ref 8.6–10.5)
CHLORIDE SERPL-SCNC: 93 MMOL/L (ref 98–107)
CK SERPL-CCNC: 214 U/L (ref 20–200)
CO2 SERPL-SCNC: 27.4 MMOL/L (ref 22–29)
COLOR FLD: ABNORMAL
CREAT SERPL-MCNC: 0.83 MG/DL (ref 0.76–1.27)
D-LACTATE SERPL-SCNC: 1.8 MMOL/L (ref 0.5–2)
DEPRECATED RDW RBC AUTO: 51 FL (ref 37–54)
EGFRCR SERPLBLD CKD-EPI 2021: 87.9 ML/MIN/1.73
EOSINOPHIL # BLD AUTO: 0.38 10*3/MM3 (ref 0–0.4)
EOSINOPHIL NFR BLD AUTO: 7.4 % (ref 0.3–6.2)
ERYTHROCYTE [DISTWIDTH] IN BLOOD BY AUTOMATED COUNT: 13.8 % (ref 12.3–15.4)
FOLATE SERPL-MCNC: 11.3 NG/ML (ref 4.78–24.2)
GLUCOSE BLDC GLUCOMTR-MCNC: 127 MG/DL (ref 70–130)
GLUCOSE BLDC GLUCOMTR-MCNC: 132 MG/DL (ref 70–130)
GLUCOSE BLDC GLUCOMTR-MCNC: 173 MG/DL (ref 70–130)
GLUCOSE SERPL-MCNC: 121 MG/DL (ref 65–99)
HBA1C MFR BLD: 4.9 % (ref 4.8–5.6)
HCT VFR BLD AUTO: 28.3 % (ref 37.5–51)
HGB BLD-MCNC: 10 G/DL (ref 13–17.7)
IMM GRANULOCYTES # BLD AUTO: 0.02 10*3/MM3 (ref 0–0.05)
IMM GRANULOCYTES NFR BLD AUTO: 0.4 % (ref 0–0.5)
INR PPP: 1.24 (ref 0.9–1.1)
LYMPHOCYTES # BLD AUTO: 1.44 10*3/MM3 (ref 0.7–3.1)
LYMPHOCYTES NFR BLD AUTO: 27.9 % (ref 19.6–45.3)
LYMPHOCYTES NFR FLD MANUAL: 28 %
MAGNESIUM SERPL-MCNC: 1.9 MG/DL (ref 1.6–2.4)
MAXIMAL PREDICTED HEART RATE: 139 BPM
MCH RBC QN AUTO: 35.7 PG (ref 26.6–33)
MCHC RBC AUTO-ENTMCNC: 35.3 G/DL (ref 31.5–35.7)
MCV RBC AUTO: 101.1 FL (ref 79–97)
METHOD: ABNORMAL
MONOCYTES # BLD AUTO: 0.64 10*3/MM3 (ref 0.1–0.9)
MONOCYTES NFR BLD AUTO: 12.4 % (ref 5–12)
MONOS+MACROS NFR FLD: 65 %
NEUTROPHILS NFR BLD AUTO: 2.63 10*3/MM3 (ref 1.7–7)
NEUTROPHILS NFR BLD AUTO: 50.9 % (ref 42.7–76)
NEUTROPHILS NFR FLD MANUAL: 7 %
NRBC BLD AUTO-RTO: 0.2 /100 WBC (ref 0–0.2)
NUC CELL # FLD: 83 /MM3
PLATELET # BLD AUTO: 146 10*3/MM3 (ref 140–450)
PMV BLD AUTO: 8.9 FL (ref 6–12)
POTASSIUM SERPL-SCNC: 3.8 MMOL/L (ref 3.5–5.2)
PROT FLD-MCNC: <1 G/DL
PROTHROMBIN TIME: 15.7 SECONDS (ref 11.7–14.2)
RBC # BLD AUTO: 2.8 10*6/MM3 (ref 4.14–5.8)
RBC # FLD AUTO: 2928 /MM3
SODIUM SERPL-SCNC: 127 MMOL/L (ref 136–145)
STRESS TARGET HR: 118 BPM
TSH SERPL DL<=0.05 MIU/L-ACNC: 3.37 UIU/ML (ref 0.27–4.2)
WBC NRBC COR # BLD: 5.16 10*3/MM3 (ref 3.4–10.8)

## 2022-10-21 PROCEDURE — A9270 NON-COVERED ITEM OR SERVICE: HCPCS | Performed by: INTERNAL MEDICINE

## 2022-10-21 PROCEDURE — G0378 HOSPITAL OBSERVATION PER HR: HCPCS

## 2022-10-21 PROCEDURE — 88305 TISSUE EXAM BY PATHOLOGIST: CPT | Performed by: INTERNAL MEDICINE

## 2022-10-21 PROCEDURE — 89051 BODY FLUID CELL COUNT: CPT | Performed by: INTERNAL MEDICINE

## 2022-10-21 PROCEDURE — 80048 BASIC METABOLIC PNL TOTAL CA: CPT | Performed by: NURSE PRACTITIONER

## 2022-10-21 PROCEDURE — 36415 COLL VENOUS BLD VENIPUNCTURE: CPT | Performed by: NURSE PRACTITIONER

## 2022-10-21 PROCEDURE — 84157 ASSAY OF PROTEIN OTHER: CPT | Performed by: INTERNAL MEDICINE

## 2022-10-21 PROCEDURE — 82550 ASSAY OF CK (CPK): CPT | Performed by: INTERNAL MEDICINE

## 2022-10-21 PROCEDURE — 87070 CULTURE OTHR SPECIMN AEROBIC: CPT | Performed by: INTERNAL MEDICINE

## 2022-10-21 PROCEDURE — 83605 ASSAY OF LACTIC ACID: CPT | Performed by: INTERNAL MEDICINE

## 2022-10-21 PROCEDURE — 85025 COMPLETE CBC W/AUTO DIFF WBC: CPT | Performed by: NURSE PRACTITIONER

## 2022-10-21 PROCEDURE — 93975 VASCULAR STUDY: CPT

## 2022-10-21 PROCEDURE — 84443 ASSAY THYROID STIM HORMONE: CPT | Performed by: INTERNAL MEDICINE

## 2022-10-21 PROCEDURE — 63710000001: Performed by: INTERNAL MEDICINE

## 2022-10-21 PROCEDURE — 63710000001 MELATONIN 5 MG TABLET: Performed by: INTERNAL MEDICINE

## 2022-10-21 PROCEDURE — 82962 GLUCOSE BLOOD TEST: CPT

## 2022-10-21 PROCEDURE — 63710000001 INSULIN LISPRO (HUMAN) PER 5 UNITS: Performed by: NURSE PRACTITIONER

## 2022-10-21 PROCEDURE — 82105 ALPHA-FETOPROTEIN SERUM: CPT | Performed by: INTERNAL MEDICINE

## 2022-10-21 PROCEDURE — 87205 SMEAR GRAM STAIN: CPT | Performed by: INTERNAL MEDICINE

## 2022-10-21 PROCEDURE — 63710000001 FUROSEMIDE 40 MG TABLET: Performed by: INTERNAL MEDICINE

## 2022-10-21 PROCEDURE — 25010000002 LEVOFLOXACIN PER 250 MG: Performed by: INTERNAL MEDICINE

## 2022-10-21 PROCEDURE — 0 LIDOCAINE 1 % SOLUTION: Performed by: RADIOLOGY

## 2022-10-21 PROCEDURE — 87040 BLOOD CULTURE FOR BACTERIA: CPT | Performed by: INTERNAL MEDICINE

## 2022-10-21 PROCEDURE — 82746 ASSAY OF FOLIC ACID SERUM: CPT | Performed by: INTERNAL MEDICINE

## 2022-10-21 PROCEDURE — 76942 ECHO GUIDE FOR BIOPSY: CPT

## 2022-10-21 PROCEDURE — 25010000002 IOPAMIDOL 61 % SOLUTION: Performed by: EMERGENCY MEDICINE

## 2022-10-21 PROCEDURE — 85610 PROTHROMBIN TIME: CPT | Performed by: NURSE PRACTITIONER

## 2022-10-21 PROCEDURE — A9270 NON-COVERED ITEM OR SERVICE: HCPCS | Performed by: NURSE PRACTITIONER

## 2022-10-21 PROCEDURE — 63710000001 FINASTERIDE 5 MG TABLET: Performed by: INTERNAL MEDICINE

## 2022-10-21 PROCEDURE — 88112 CYTOPATH CELL ENHANCE TECH: CPT | Performed by: INTERNAL MEDICINE

## 2022-10-21 PROCEDURE — 82042 OTHER SOURCE ALBUMIN QUAN EA: CPT | Performed by: INTERNAL MEDICINE

## 2022-10-21 PROCEDURE — 83036 HEMOGLOBIN GLYCOSYLATED A1C: CPT | Performed by: INTERNAL MEDICINE

## 2022-10-21 PROCEDURE — 63710000001 OXYCODONE-ACETAMINOPHEN 7.5-325 MG TABLET: Performed by: INTERNAL MEDICINE

## 2022-10-21 PROCEDURE — 99204 OFFICE O/P NEW MOD 45 MIN: CPT | Performed by: INTERNAL MEDICINE

## 2022-10-21 RX ORDER — ACETAMINOPHEN 160 MG/5ML
650 SOLUTION ORAL EVERY 4 HOURS PRN
Status: DISCONTINUED | OUTPATIENT
Start: 2022-10-21 | End: 2022-11-01 | Stop reason: HOSPADM

## 2022-10-21 RX ORDER — ACETAMINOPHEN 325 MG/1
650 TABLET ORAL EVERY 4 HOURS PRN
Status: DISCONTINUED | OUTPATIENT
Start: 2022-10-21 | End: 2022-11-01 | Stop reason: HOSPADM

## 2022-10-21 RX ORDER — CHOLECALCIFEROL (VITAMIN D3) 125 MCG
5 CAPSULE ORAL NIGHTLY
Status: DISCONTINUED | OUTPATIENT
Start: 2022-10-21 | End: 2022-11-01 | Stop reason: HOSPADM

## 2022-10-21 RX ORDER — LIDOCAINE HYDROCHLORIDE 10 MG/ML
10 INJECTION, SOLUTION INFILTRATION; PERINEURAL ONCE
Status: COMPLETED | OUTPATIENT
Start: 2022-10-21 | End: 2022-10-21

## 2022-10-21 RX ORDER — AMOXICILLIN 250 MG
2 CAPSULE ORAL DAILY
Status: DISCONTINUED | OUTPATIENT
Start: 2022-10-21 | End: 2022-11-01 | Stop reason: HOSPADM

## 2022-10-21 RX ORDER — ONDANSETRON 2 MG/ML
4 INJECTION INTRAMUSCULAR; INTRAVENOUS EVERY 6 HOURS PRN
Status: DISCONTINUED | OUTPATIENT
Start: 2022-10-21 | End: 2022-11-01 | Stop reason: HOSPADM

## 2022-10-21 RX ORDER — FUROSEMIDE 40 MG/1
40 TABLET ORAL DAILY
Status: DISCONTINUED | OUTPATIENT
Start: 2022-10-21 | End: 2022-10-25

## 2022-10-21 RX ORDER — LEVOFLOXACIN 5 MG/ML
750 INJECTION, SOLUTION INTRAVENOUS EVERY 24 HOURS
Status: COMPLETED | OUTPATIENT
Start: 2022-10-21 | End: 2022-10-25

## 2022-10-21 RX ORDER — FINASTERIDE 5 MG/1
5 TABLET, FILM COATED ORAL DAILY
Status: DISCONTINUED | OUTPATIENT
Start: 2022-10-21 | End: 2022-11-01 | Stop reason: HOSPADM

## 2022-10-21 RX ORDER — LISINOPRIL 5 MG/1
5 TABLET ORAL DAILY
Status: DISCONTINUED | OUTPATIENT
Start: 2022-10-21 | End: 2022-10-22

## 2022-10-21 RX ORDER — SODIUM CHLORIDE 0.9 % (FLUSH) 0.9 %
10 SYRINGE (ML) INJECTION AS NEEDED
Status: DISCONTINUED | OUTPATIENT
Start: 2022-10-21 | End: 2022-11-01 | Stop reason: HOSPADM

## 2022-10-21 RX ORDER — SPIRONOLACTONE 50 MG/1
50 TABLET, FILM COATED ORAL DAILY
Status: DISCONTINUED | OUTPATIENT
Start: 2022-10-21 | End: 2022-10-25

## 2022-10-21 RX ORDER — INSULIN LISPRO 100 [IU]/ML
0-7 INJECTION, SOLUTION INTRAVENOUS; SUBCUTANEOUS
Status: DISCONTINUED | OUTPATIENT
Start: 2022-10-21 | End: 2022-11-01 | Stop reason: HOSPADM

## 2022-10-21 RX ORDER — CHOLECALCIFEROL (VITAMIN D3) 125 MCG
500 CAPSULE ORAL DAILY
Status: DISCONTINUED | OUTPATIENT
Start: 2022-10-21 | End: 2022-11-01 | Stop reason: HOSPADM

## 2022-10-21 RX ORDER — ACETAMINOPHEN 650 MG/1
650 SUPPOSITORY RECTAL EVERY 4 HOURS PRN
Status: DISCONTINUED | OUTPATIENT
Start: 2022-10-21 | End: 2022-11-01 | Stop reason: HOSPADM

## 2022-10-21 RX ORDER — OXYCODONE AND ACETAMINOPHEN 7.5; 325 MG/1; MG/1
1 TABLET ORAL EVERY 4 HOURS PRN
Status: DISCONTINUED | OUTPATIENT
Start: 2022-10-21 | End: 2022-11-01 | Stop reason: HOSPADM

## 2022-10-21 RX ORDER — DEXTROSE MONOHYDRATE 25 G/50ML
25 INJECTION, SOLUTION INTRAVENOUS
Status: DISCONTINUED | OUTPATIENT
Start: 2022-10-21 | End: 2022-11-01 | Stop reason: HOSPADM

## 2022-10-21 RX ORDER — SODIUM CHLORIDE 0.9 % (FLUSH) 0.9 %
10 SYRINGE (ML) INJECTION EVERY 12 HOURS SCHEDULED
Status: DISCONTINUED | OUTPATIENT
Start: 2022-10-21 | End: 2022-11-01 | Stop reason: HOSPADM

## 2022-10-21 RX ORDER — NICOTINE POLACRILEX 4 MG
15 LOZENGE BUCCAL
Status: DISCONTINUED | OUTPATIENT
Start: 2022-10-21 | End: 2022-11-01 | Stop reason: HOSPADM

## 2022-10-21 RX ADMIN — INSULIN LISPRO 2 UNITS: 100 INJECTION, SOLUTION INTRAVENOUS; SUBCUTANEOUS at 19:14

## 2022-10-21 RX ADMIN — Medication 5 MG: at 22:42

## 2022-10-21 RX ADMIN — LEVOFLOXACIN 750 MG: 5 INJECTION, SOLUTION INTRAVENOUS at 22:46

## 2022-10-21 RX ADMIN — FINASTERIDE 5 MG: 5 TABLET, FILM COATED ORAL at 22:44

## 2022-10-21 RX ADMIN — FUROSEMIDE 40 MG: 40 TABLET ORAL at 22:42

## 2022-10-21 RX ADMIN — CARIPRAZINE 4.5 MG: 4.5 CAPSULE, GELATIN COATED ORAL at 22:43

## 2022-10-21 RX ADMIN — IOPAMIDOL 85 ML: 612 INJECTION, SOLUTION INTRAVENOUS at 00:02

## 2022-10-21 RX ADMIN — Medication 10 ML: at 22:42

## 2022-10-21 RX ADMIN — LIDOCAINE HYDROCHLORIDE 9 ML: 10 INJECTION, SOLUTION INFILTRATION; PERINEURAL at 16:23

## 2022-10-21 RX ADMIN — OXYCODONE HYDROCHLORIDE AND ACETAMINOPHEN 1 TABLET: 7.5; 325 TABLET ORAL at 22:52

## 2022-10-22 LAB
ALBUMIN SERPL-MCNC: 2.9 G/DL (ref 3.5–5.2)
ALBUMIN/GLOB SERPL: 1.2 G/DL
ALP SERPL-CCNC: 91 U/L (ref 39–117)
ALT SERPL W P-5'-P-CCNC: 23 U/L (ref 1–41)
ANION GAP SERPL CALCULATED.3IONS-SCNC: 8 MMOL/L (ref 5–15)
AST SERPL-CCNC: 64 U/L (ref 1–40)
BASOPHILS # BLD AUTO: 0.04 10*3/MM3 (ref 0–0.2)
BASOPHILS NFR BLD AUTO: 0.9 % (ref 0–1.5)
BILIRUB SERPL-MCNC: 2.6 MG/DL (ref 0–1.2)
BUN SERPL-MCNC: 7 MG/DL (ref 8–23)
BUN/CREAT SERPL: 6.9 (ref 7–25)
CALCIUM SPEC-SCNC: 8.5 MG/DL (ref 8.6–10.5)
CHLORIDE SERPL-SCNC: 99 MMOL/L (ref 98–107)
CO2 SERPL-SCNC: 27 MMOL/L (ref 22–29)
CREAT SERPL-MCNC: 1.01 MG/DL (ref 0.76–1.27)
DEPRECATED RDW RBC AUTO: 52.8 FL (ref 37–54)
EGFRCR SERPLBLD CKD-EPI 2021: 74.7 ML/MIN/1.73
EOSINOPHIL # BLD AUTO: 0.33 10*3/MM3 (ref 0–0.4)
EOSINOPHIL NFR BLD AUTO: 7.8 % (ref 0.3–6.2)
ERYTHROCYTE [DISTWIDTH] IN BLOOD BY AUTOMATED COUNT: 14 % (ref 12.3–15.4)
GLOBULIN UR ELPH-MCNC: 2.5 GM/DL
GLUCOSE BLDC GLUCOMTR-MCNC: 130 MG/DL (ref 70–130)
GLUCOSE BLDC GLUCOMTR-MCNC: 150 MG/DL (ref 70–130)
GLUCOSE BLDC GLUCOMTR-MCNC: 152 MG/DL (ref 70–130)
GLUCOSE BLDC GLUCOMTR-MCNC: 160 MG/DL (ref 70–130)
GLUCOSE BLDC GLUCOMTR-MCNC: 205 MG/DL (ref 70–130)
GLUCOSE SERPL-MCNC: 110 MG/DL (ref 65–99)
HCT VFR BLD AUTO: 29.8 % (ref 37.5–51)
HGB BLD-MCNC: 10.6 G/DL (ref 13–17.7)
IMM GRANULOCYTES # BLD AUTO: 0.02 10*3/MM3 (ref 0–0.05)
IMM GRANULOCYTES NFR BLD AUTO: 0.5 % (ref 0–0.5)
LYMPHOCYTES # BLD AUTO: 1.29 10*3/MM3 (ref 0.7–3.1)
LYMPHOCYTES NFR BLD AUTO: 30.6 % (ref 19.6–45.3)
MCH RBC QN AUTO: 36.4 PG (ref 26.6–33)
MCHC RBC AUTO-ENTMCNC: 35.6 G/DL (ref 31.5–35.7)
MCV RBC AUTO: 102.4 FL (ref 79–97)
MONOCYTES # BLD AUTO: 0.53 10*3/MM3 (ref 0.1–0.9)
MONOCYTES NFR BLD AUTO: 12.6 % (ref 5–12)
NEUTROPHILS NFR BLD AUTO: 2.01 10*3/MM3 (ref 1.7–7)
NEUTROPHILS NFR BLD AUTO: 47.6 % (ref 42.7–76)
NRBC BLD AUTO-RTO: 0.2 /100 WBC (ref 0–0.2)
PLATELET # BLD AUTO: 130 10*3/MM3 (ref 140–450)
PMV BLD AUTO: 9 FL (ref 6–12)
POTASSIUM SERPL-SCNC: 4 MMOL/L (ref 3.5–5.2)
PROT SERPL-MCNC: 5.4 G/DL (ref 6–8.5)
RBC # BLD AUTO: 2.91 10*6/MM3 (ref 4.14–5.8)
SODIUM SERPL-SCNC: 134 MMOL/L (ref 136–145)
VIT B12 BLD-MCNC: >2000 PG/ML (ref 211–946)
WBC NRBC COR # BLD: 4.22 10*3/MM3 (ref 3.4–10.8)

## 2022-10-22 PROCEDURE — 80053 COMPREHEN METABOLIC PANEL: CPT | Performed by: INTERNAL MEDICINE

## 2022-10-22 PROCEDURE — 63710000001 INSULIN LISPRO (HUMAN) PER 5 UNITS: Performed by: NURSE PRACTITIONER

## 2022-10-22 PROCEDURE — 82962 GLUCOSE BLOOD TEST: CPT

## 2022-10-22 PROCEDURE — 99214 OFFICE O/P EST MOD 30 MIN: CPT | Performed by: INTERNAL MEDICINE

## 2022-10-22 PROCEDURE — 85025 COMPLETE CBC W/AUTO DIFF WBC: CPT | Performed by: INTERNAL MEDICINE

## 2022-10-22 PROCEDURE — 25010000002 LEVOFLOXACIN PER 250 MG: Performed by: INTERNAL MEDICINE

## 2022-10-22 PROCEDURE — G0378 HOSPITAL OBSERVATION PER HR: HCPCS

## 2022-10-22 PROCEDURE — 82607 VITAMIN B-12: CPT | Performed by: INTERNAL MEDICINE

## 2022-10-22 RX ADMIN — Medication 10 ML: at 21:04

## 2022-10-22 RX ADMIN — DOCUSATE SODIUM 50MG AND SENNOSIDES 8.6MG 2 TABLET: 8.6; 5 TABLET, FILM COATED ORAL at 09:05

## 2022-10-22 RX ADMIN — FINASTERIDE 5 MG: 5 TABLET, FILM COATED ORAL at 12:25

## 2022-10-22 RX ADMIN — INSULIN LISPRO 3 UNITS: 100 INJECTION, SOLUTION INTRAVENOUS; SUBCUTANEOUS at 17:00

## 2022-10-22 RX ADMIN — Medication 10 ML: at 09:04

## 2022-10-22 RX ADMIN — SPIRONOLACTONE 50 MG: 50 TABLET, FILM COATED ORAL at 09:04

## 2022-10-22 RX ADMIN — CARIPRAZINE 4.5 MG: 4.5 CAPSULE, GELATIN COATED ORAL at 17:00

## 2022-10-22 RX ADMIN — LISINOPRIL 5 MG: 5 TABLET ORAL at 09:05

## 2022-10-22 RX ADMIN — OXYCODONE HYDROCHLORIDE AND ACETAMINOPHEN 1 TABLET: 7.5; 325 TABLET ORAL at 17:10

## 2022-10-22 RX ADMIN — LEVOFLOXACIN 750 MG: 5 INJECTION, SOLUTION INTRAVENOUS at 17:03

## 2022-10-22 RX ADMIN — FUROSEMIDE 40 MG: 40 TABLET ORAL at 09:04

## 2022-10-22 RX ADMIN — Medication 5 MG: at 21:04

## 2022-10-22 RX ADMIN — Medication 500 MCG: at 09:05

## 2022-10-22 RX ADMIN — INSULIN LISPRO 2 UNITS: 100 INJECTION, SOLUTION INTRAVENOUS; SUBCUTANEOUS at 12:25

## 2022-10-23 LAB
ALBUMIN SERPL-MCNC: 2.6 G/DL (ref 3.5–5.2)
ALBUMIN/GLOB SERPL: 1.5 G/DL
ALP SERPL-CCNC: 81 U/L (ref 39–117)
ALT SERPL W P-5'-P-CCNC: 20 U/L (ref 1–41)
ANION GAP SERPL CALCULATED.3IONS-SCNC: 7.6 MMOL/L (ref 5–15)
AST SERPL-CCNC: 56 U/L (ref 1–40)
BASOPHILS # BLD AUTO: 0.03 10*3/MM3 (ref 0–0.2)
BASOPHILS NFR BLD AUTO: 0.8 % (ref 0–1.5)
BILIRUB SERPL-MCNC: 2 MG/DL (ref 0–1.2)
BUN SERPL-MCNC: 9 MG/DL (ref 8–23)
BUN/CREAT SERPL: 8.9 (ref 7–25)
CALCIUM SPEC-SCNC: 8.1 MG/DL (ref 8.6–10.5)
CHLORIDE SERPL-SCNC: 97 MMOL/L (ref 98–107)
CO2 SERPL-SCNC: 28.4 MMOL/L (ref 22–29)
CREAT SERPL-MCNC: 1.01 MG/DL (ref 0.76–1.27)
DEPRECATED RDW RBC AUTO: 51.3 FL (ref 37–54)
EGFRCR SERPLBLD CKD-EPI 2021: 74.7 ML/MIN/1.73
EOSINOPHIL # BLD AUTO: 0.34 10*3/MM3 (ref 0–0.4)
EOSINOPHIL NFR BLD AUTO: 9.1 % (ref 0.3–6.2)
ERYTHROCYTE [DISTWIDTH] IN BLOOD BY AUTOMATED COUNT: 13.9 % (ref 12.3–15.4)
GLOBULIN UR ELPH-MCNC: 1.7 GM/DL
GLUCOSE BLDC GLUCOMTR-MCNC: 118 MG/DL (ref 70–130)
GLUCOSE BLDC GLUCOMTR-MCNC: 154 MG/DL (ref 70–130)
GLUCOSE BLDC GLUCOMTR-MCNC: 159 MG/DL (ref 70–130)
GLUCOSE BLDC GLUCOMTR-MCNC: 181 MG/DL (ref 70–130)
GLUCOSE SERPL-MCNC: 106 MG/DL (ref 65–99)
HCT VFR BLD AUTO: 26.3 % (ref 37.5–51)
HGB BLD-MCNC: 9.5 G/DL (ref 13–17.7)
IMM GRANULOCYTES # BLD AUTO: 0.02 10*3/MM3 (ref 0–0.05)
IMM GRANULOCYTES NFR BLD AUTO: 0.5 % (ref 0–0.5)
LYMPHOCYTES # BLD AUTO: 1.14 10*3/MM3 (ref 0.7–3.1)
LYMPHOCYTES NFR BLD AUTO: 30.6 % (ref 19.6–45.3)
MCH RBC QN AUTO: 36 PG (ref 26.6–33)
MCHC RBC AUTO-ENTMCNC: 36.1 G/DL (ref 31.5–35.7)
MCV RBC AUTO: 99.6 FL (ref 79–97)
MONOCYTES # BLD AUTO: 0.42 10*3/MM3 (ref 0.1–0.9)
MONOCYTES NFR BLD AUTO: 11.3 % (ref 5–12)
NEUTROPHILS NFR BLD AUTO: 1.78 10*3/MM3 (ref 1.7–7)
NEUTROPHILS NFR BLD AUTO: 47.7 % (ref 42.7–76)
NRBC BLD AUTO-RTO: 0.3 /100 WBC (ref 0–0.2)
PLATELET # BLD AUTO: 108 10*3/MM3 (ref 140–450)
PMV BLD AUTO: 8.9 FL (ref 6–12)
POTASSIUM SERPL-SCNC: 3.9 MMOL/L (ref 3.5–5.2)
PROT SERPL-MCNC: 4.3 G/DL (ref 6–8.5)
RBC # BLD AUTO: 2.64 10*6/MM3 (ref 4.14–5.8)
SODIUM SERPL-SCNC: 133 MMOL/L (ref 136–145)
WBC NRBC COR # BLD: 3.73 10*3/MM3 (ref 3.4–10.8)

## 2022-10-23 PROCEDURE — 82962 GLUCOSE BLOOD TEST: CPT

## 2022-10-23 PROCEDURE — G0378 HOSPITAL OBSERVATION PER HR: HCPCS

## 2022-10-23 PROCEDURE — 80053 COMPREHEN METABOLIC PANEL: CPT | Performed by: INTERNAL MEDICINE

## 2022-10-23 PROCEDURE — 85025 COMPLETE CBC W/AUTO DIFF WBC: CPT | Performed by: INTERNAL MEDICINE

## 2022-10-23 PROCEDURE — 99214 OFFICE O/P EST MOD 30 MIN: CPT | Performed by: NURSE PRACTITIONER

## 2022-10-23 PROCEDURE — 25010000002 LEVOFLOXACIN PER 250 MG: Performed by: INTERNAL MEDICINE

## 2022-10-23 RX ORDER — LACTULOSE 10 G/15ML
20 SOLUTION ORAL 2 TIMES DAILY
Status: DISCONTINUED | OUTPATIENT
Start: 2022-10-23 | End: 2022-10-24

## 2022-10-23 RX ADMIN — Medication 500 MCG: at 11:04

## 2022-10-23 RX ADMIN — Medication 10 ML: at 20:45

## 2022-10-23 RX ADMIN — FINASTERIDE 5 MG: 5 TABLET, FILM COATED ORAL at 11:04

## 2022-10-23 RX ADMIN — SPIRONOLACTONE 50 MG: 50 TABLET, FILM COATED ORAL at 11:04

## 2022-10-23 RX ADMIN — CARIPRAZINE 4.5 MG: 4.5 CAPSULE, GELATIN COATED ORAL at 20:40

## 2022-10-23 RX ADMIN — LEVOFLOXACIN 750 MG: 5 INJECTION, SOLUTION INTRAVENOUS at 16:37

## 2022-10-23 RX ADMIN — Medication 5 MG: at 20:39

## 2022-10-23 RX ADMIN — LACTULOSE 20 G: 20 SOLUTION ORAL at 21:43

## 2022-10-23 RX ADMIN — OXYCODONE HYDROCHLORIDE AND ACETAMINOPHEN 1 TABLET: 7.5; 325 TABLET ORAL at 18:49

## 2022-10-23 RX ADMIN — FUROSEMIDE 40 MG: 40 TABLET ORAL at 10:41

## 2022-10-24 LAB
ALBUMIN SERPL-MCNC: 2.4 G/DL (ref 3.5–5.2)
ALBUMIN/GLOB SERPL: 1.1 G/DL
ALP SERPL-CCNC: 82 U/L (ref 39–117)
ALT SERPL W P-5'-P-CCNC: 22 U/L (ref 1–41)
ANION GAP SERPL CALCULATED.3IONS-SCNC: 9.6 MMOL/L (ref 5–15)
AST SERPL-CCNC: 50 U/L (ref 1–40)
BACTERIA FLD CULT: NORMAL
BASOPHILS # BLD AUTO: 0.03 10*3/MM3 (ref 0–0.2)
BASOPHILS NFR BLD AUTO: 0.8 % (ref 0–1.5)
BILIRUB SERPL-MCNC: 1.9 MG/DL (ref 0–1.2)
BUN SERPL-MCNC: 7 MG/DL (ref 8–23)
BUN/CREAT SERPL: 7.1 (ref 7–25)
CALCIUM SPEC-SCNC: 7.9 MG/DL (ref 8.6–10.5)
CHLORIDE SERPL-SCNC: 99 MMOL/L (ref 98–107)
CK SERPL-CCNC: 136 U/L (ref 20–200)
CO2 SERPL-SCNC: 26.4 MMOL/L (ref 22–29)
CREAT SERPL-MCNC: 0.98 MG/DL (ref 0.76–1.27)
DEPRECATED RDW RBC AUTO: 51.9 FL (ref 37–54)
EGFRCR SERPLBLD CKD-EPI 2021: 77.5 ML/MIN/1.73
EOSINOPHIL # BLD AUTO: 0.39 10*3/MM3 (ref 0–0.4)
EOSINOPHIL NFR BLD AUTO: 10.4 % (ref 0.3–6.2)
ERYTHROCYTE [DISTWIDTH] IN BLOOD BY AUTOMATED COUNT: 13.6 % (ref 12.3–15.4)
GLOBULIN UR ELPH-MCNC: 2.2 GM/DL
GLUCOSE BLDC GLUCOMTR-MCNC: 127 MG/DL (ref 70–130)
GLUCOSE BLDC GLUCOMTR-MCNC: 159 MG/DL (ref 70–130)
GLUCOSE BLDC GLUCOMTR-MCNC: 162 MG/DL (ref 70–130)
GLUCOSE BLDC GLUCOMTR-MCNC: 197 MG/DL (ref 70–130)
GLUCOSE SERPL-MCNC: 109 MG/DL (ref 65–99)
GRAM STN SPEC: NORMAL
GRAM STN SPEC: NORMAL
HCT VFR BLD AUTO: 27.5 % (ref 37.5–51)
HGB BLD-MCNC: 9.6 G/DL (ref 13–17.7)
IMM GRANULOCYTES # BLD AUTO: 0.01 10*3/MM3 (ref 0–0.05)
IMM GRANULOCYTES NFR BLD AUTO: 0.3 % (ref 0–0.5)
LYMPHOCYTES # BLD AUTO: 1.17 10*3/MM3 (ref 0.7–3.1)
LYMPHOCYTES NFR BLD AUTO: 31.2 % (ref 19.6–45.3)
MCH RBC QN AUTO: 36 PG (ref 26.6–33)
MCHC RBC AUTO-ENTMCNC: 34.9 G/DL (ref 31.5–35.7)
MCV RBC AUTO: 103 FL (ref 79–97)
MONOCYTES # BLD AUTO: 0.45 10*3/MM3 (ref 0.1–0.9)
MONOCYTES NFR BLD AUTO: 12 % (ref 5–12)
NEUTROPHILS NFR BLD AUTO: 1.7 10*3/MM3 (ref 1.7–7)
NEUTROPHILS NFR BLD AUTO: 45.3 % (ref 42.7–76)
NRBC BLD AUTO-RTO: 0 /100 WBC (ref 0–0.2)
PLATELET # BLD AUTO: 102 10*3/MM3 (ref 140–450)
PMV BLD AUTO: 9 FL (ref 6–12)
POTASSIUM SERPL-SCNC: 3.5 MMOL/L (ref 3.5–5.2)
PROT SERPL-MCNC: 4.6 G/DL (ref 6–8.5)
RBC # BLD AUTO: 2.67 10*6/MM3 (ref 4.14–5.8)
SODIUM SERPL-SCNC: 135 MMOL/L (ref 136–145)
WBC NRBC COR # BLD: 3.75 10*3/MM3 (ref 3.4–10.8)

## 2022-10-24 PROCEDURE — 97162 PT EVAL MOD COMPLEX 30 MIN: CPT

## 2022-10-24 PROCEDURE — 85025 COMPLETE CBC W/AUTO DIFF WBC: CPT | Performed by: INTERNAL MEDICINE

## 2022-10-24 PROCEDURE — 25010000002 LEVOFLOXACIN PER 250 MG: Performed by: INTERNAL MEDICINE

## 2022-10-24 PROCEDURE — G0378 HOSPITAL OBSERVATION PER HR: HCPCS

## 2022-10-24 PROCEDURE — 63710000001 INSULIN LISPRO (HUMAN) PER 5 UNITS: Performed by: NURSE PRACTITIONER

## 2022-10-24 PROCEDURE — 97530 THERAPEUTIC ACTIVITIES: CPT

## 2022-10-24 PROCEDURE — 97110 THERAPEUTIC EXERCISES: CPT

## 2022-10-24 PROCEDURE — 80053 COMPREHEN METABOLIC PANEL: CPT | Performed by: INTERNAL MEDICINE

## 2022-10-24 PROCEDURE — 97116 GAIT TRAINING THERAPY: CPT

## 2022-10-24 PROCEDURE — 82550 ASSAY OF CK (CPK): CPT | Performed by: INTERNAL MEDICINE

## 2022-10-24 PROCEDURE — 99214 OFFICE O/P EST MOD 30 MIN: CPT | Performed by: PHYSICIAN ASSISTANT

## 2022-10-24 PROCEDURE — 82962 GLUCOSE BLOOD TEST: CPT

## 2022-10-24 RX ORDER — LACTULOSE 10 G/15ML
20 SOLUTION ORAL 3 TIMES DAILY
Status: DISCONTINUED | OUTPATIENT
Start: 2022-10-24 | End: 2022-11-01 | Stop reason: HOSPADM

## 2022-10-24 RX ORDER — DOCUSATE SODIUM 100 MG/1
100 CAPSULE, LIQUID FILLED ORAL 2 TIMES DAILY
Status: DISCONTINUED | OUTPATIENT
Start: 2022-10-24 | End: 2022-11-01 | Stop reason: HOSPADM

## 2022-10-24 RX ORDER — BISACODYL 10 MG
10 SUPPOSITORY, RECTAL RECTAL DAILY
Status: DISCONTINUED | OUTPATIENT
Start: 2022-10-24 | End: 2022-11-01 | Stop reason: HOSPADM

## 2022-10-24 RX ADMIN — DOCUSATE SODIUM 50MG AND SENNOSIDES 8.6MG 2 TABLET: 8.6; 5 TABLET, FILM COATED ORAL at 09:14

## 2022-10-24 RX ADMIN — INSULIN LISPRO 2 UNITS: 100 INJECTION, SOLUTION INTRAVENOUS; SUBCUTANEOUS at 17:02

## 2022-10-24 RX ADMIN — BISACODYL 10 MG: 10 SUPPOSITORY RECTAL at 16:43

## 2022-10-24 RX ADMIN — Medication 10 ML: at 09:18

## 2022-10-24 RX ADMIN — CARIPRAZINE 4.5 MG: 4.5 CAPSULE, GELATIN COATED ORAL at 21:10

## 2022-10-24 RX ADMIN — OXYCODONE HYDROCHLORIDE AND ACETAMINOPHEN 1 TABLET: 7.5; 325 TABLET ORAL at 21:09

## 2022-10-24 RX ADMIN — DOCUSATE SODIUM 100 MG: 100 CAPSULE, LIQUID FILLED ORAL at 21:09

## 2022-10-24 RX ADMIN — INSULIN LISPRO 2 UNITS: 100 INJECTION, SOLUTION INTRAVENOUS; SUBCUTANEOUS at 13:47

## 2022-10-24 RX ADMIN — LACTULOSE 20 G: 20 SOLUTION ORAL at 16:43

## 2022-10-24 RX ADMIN — FINASTERIDE 5 MG: 5 TABLET, FILM COATED ORAL at 09:18

## 2022-10-24 RX ADMIN — LACTULOSE 20 G: 20 SOLUTION ORAL at 21:10

## 2022-10-24 RX ADMIN — LACTULOSE 20 G: 20 SOLUTION ORAL at 09:18

## 2022-10-24 RX ADMIN — Medication 5 MG: at 21:09

## 2022-10-24 RX ADMIN — Medication 10 ML: at 21:30

## 2022-10-24 RX ADMIN — OXYCODONE HYDROCHLORIDE AND ACETAMINOPHEN 1 TABLET: 7.5; 325 TABLET ORAL at 17:12

## 2022-10-24 RX ADMIN — Medication 500 MCG: at 09:19

## 2022-10-24 RX ADMIN — FUROSEMIDE 40 MG: 40 TABLET ORAL at 09:14

## 2022-10-24 RX ADMIN — LEVOFLOXACIN 750 MG: 5 INJECTION, SOLUTION INTRAVENOUS at 16:43

## 2022-10-24 RX ADMIN — SPIRONOLACTONE 50 MG: 50 TABLET, FILM COATED ORAL at 09:18

## 2022-10-25 LAB
ALBUMIN SERPL-MCNC: 2.6 G/DL (ref 3.5–5.2)
ALBUMIN/GLOB SERPL: 1 G/DL
ALP SERPL-CCNC: 93 U/L (ref 39–117)
ALT SERPL W P-5'-P-CCNC: 20 U/L (ref 1–41)
ANION GAP SERPL CALCULATED.3IONS-SCNC: 10.8 MMOL/L (ref 5–15)
AST SERPL-CCNC: 50 U/L (ref 1–40)
BASOPHILS # BLD AUTO: 0.04 10*3/MM3 (ref 0–0.2)
BASOPHILS NFR BLD AUTO: 0.9 % (ref 0–1.5)
BILIRUB SERPL-MCNC: 2.2 MG/DL (ref 0–1.2)
BUN SERPL-MCNC: 7 MG/DL (ref 8–23)
BUN/CREAT SERPL: 7.3 (ref 7–25)
CALCIUM SPEC-SCNC: 8.3 MG/DL (ref 8.6–10.5)
CHLORIDE SERPL-SCNC: 99 MMOL/L (ref 98–107)
CO2 SERPL-SCNC: 26.2 MMOL/L (ref 22–29)
CREAT SERPL-MCNC: 0.96 MG/DL (ref 0.76–1.27)
CYTO UR: NORMAL
DEPRECATED RDW RBC AUTO: 51.9 FL (ref 37–54)
EGFRCR SERPLBLD CKD-EPI 2021: 79.4 ML/MIN/1.73
EOSINOPHIL # BLD AUTO: 0.48 10*3/MM3 (ref 0–0.4)
EOSINOPHIL NFR BLD AUTO: 10.8 % (ref 0.3–6.2)
ERYTHROCYTE [DISTWIDTH] IN BLOOD BY AUTOMATED COUNT: 13.7 % (ref 12.3–15.4)
GLOBULIN UR ELPH-MCNC: 2.5 GM/DL
GLUCOSE BLDC GLUCOMTR-MCNC: 117 MG/DL (ref 70–130)
GLUCOSE BLDC GLUCOMTR-MCNC: 131 MG/DL (ref 70–130)
GLUCOSE BLDC GLUCOMTR-MCNC: 170 MG/DL (ref 70–130)
GLUCOSE SERPL-MCNC: 115 MG/DL (ref 65–99)
HCT VFR BLD AUTO: 29.7 % (ref 37.5–51)
HGB BLD-MCNC: 10.2 G/DL (ref 13–17.7)
IMM GRANULOCYTES # BLD AUTO: 0.01 10*3/MM3 (ref 0–0.05)
IMM GRANULOCYTES NFR BLD AUTO: 0.2 % (ref 0–0.5)
LAB AP CASE REPORT: NORMAL
LYMPHOCYTES # BLD AUTO: 1.25 10*3/MM3 (ref 0.7–3.1)
LYMPHOCYTES NFR BLD AUTO: 28 % (ref 19.6–45.3)
MCH RBC QN AUTO: 35.2 PG (ref 26.6–33)
MCHC RBC AUTO-ENTMCNC: 34.3 G/DL (ref 31.5–35.7)
MCV RBC AUTO: 102.4 FL (ref 79–97)
MONOCYTES # BLD AUTO: 0.44 10*3/MM3 (ref 0.1–0.9)
MONOCYTES NFR BLD AUTO: 9.9 % (ref 5–12)
NEUTROPHILS NFR BLD AUTO: 2.24 10*3/MM3 (ref 1.7–7)
NEUTROPHILS NFR BLD AUTO: 50.2 % (ref 42.7–76)
NRBC BLD AUTO-RTO: 0 /100 WBC (ref 0–0.2)
PATH REPORT.FINAL DX SPEC: NORMAL
PATH REPORT.GROSS SPEC: NORMAL
PLATELET # BLD AUTO: 111 10*3/MM3 (ref 140–450)
PMV BLD AUTO: 9.1 FL (ref 6–12)
POTASSIUM SERPL-SCNC: 3.9 MMOL/L (ref 3.5–5.2)
PROT SERPL-MCNC: 5.1 G/DL (ref 6–8.5)
RBC # BLD AUTO: 2.9 10*6/MM3 (ref 4.14–5.8)
SODIUM SERPL-SCNC: 136 MMOL/L (ref 136–145)
WBC NRBC COR # BLD: 4.46 10*3/MM3 (ref 3.4–10.8)

## 2022-10-25 PROCEDURE — 97116 GAIT TRAINING THERAPY: CPT

## 2022-10-25 PROCEDURE — G0378 HOSPITAL OBSERVATION PER HR: HCPCS

## 2022-10-25 PROCEDURE — 63710000001 INSULIN LISPRO (HUMAN) PER 5 UNITS: Performed by: NURSE PRACTITIONER

## 2022-10-25 PROCEDURE — 80053 COMPREHEN METABOLIC PANEL: CPT | Performed by: INTERNAL MEDICINE

## 2022-10-25 PROCEDURE — 25010000002 LEVOFLOXACIN PER 250 MG: Performed by: INTERNAL MEDICINE

## 2022-10-25 PROCEDURE — 85025 COMPLETE CBC W/AUTO DIFF WBC: CPT | Performed by: INTERNAL MEDICINE

## 2022-10-25 PROCEDURE — 99214 OFFICE O/P EST MOD 30 MIN: CPT | Performed by: INTERNAL MEDICINE

## 2022-10-25 PROCEDURE — 82962 GLUCOSE BLOOD TEST: CPT

## 2022-10-25 RX ORDER — FUROSEMIDE 20 MG/1
20 TABLET ORAL ONCE
Status: COMPLETED | OUTPATIENT
Start: 2022-10-25 | End: 2022-10-25

## 2022-10-25 RX ORDER — SPIRONOLACTONE 50 MG/1
50 TABLET, FILM COATED ORAL ONCE
Status: COMPLETED | OUTPATIENT
Start: 2022-10-25 | End: 2022-10-25

## 2022-10-25 RX ORDER — SPIRONOLACTONE 100 MG/1
100 TABLET, FILM COATED ORAL DAILY
Status: DISCONTINUED | OUTPATIENT
Start: 2022-10-26 | End: 2022-11-01 | Stop reason: HOSPADM

## 2022-10-25 RX ADMIN — DOCUSATE SODIUM 100 MG: 100 CAPSULE, LIQUID FILLED ORAL at 11:01

## 2022-10-25 RX ADMIN — FINASTERIDE 5 MG: 5 TABLET, FILM COATED ORAL at 09:52

## 2022-10-25 RX ADMIN — OXYCODONE HYDROCHLORIDE AND ACETAMINOPHEN 1 TABLET: 7.5; 325 TABLET ORAL at 13:25

## 2022-10-25 RX ADMIN — LACTULOSE 20 G: 20 SOLUTION ORAL at 22:07

## 2022-10-25 RX ADMIN — Medication 10 ML: at 22:08

## 2022-10-25 RX ADMIN — Medication 10 ML: at 10:55

## 2022-10-25 RX ADMIN — Medication 5 MG: at 22:07

## 2022-10-25 RX ADMIN — SPIRONOLACTONE 50 MG: 50 TABLET, FILM COATED ORAL at 09:51

## 2022-10-25 RX ADMIN — Medication 500 MCG: at 09:51

## 2022-10-25 RX ADMIN — DOCUSATE SODIUM 50MG AND SENNOSIDES 8.6MG 2 TABLET: 8.6; 5 TABLET, FILM COATED ORAL at 09:51

## 2022-10-25 RX ADMIN — CARIPRAZINE 4.5 MG: 4.5 CAPSULE, GELATIN COATED ORAL at 17:32

## 2022-10-25 RX ADMIN — LACTULOSE 20 G: 20 SOLUTION ORAL at 17:31

## 2022-10-25 RX ADMIN — FUROSEMIDE 20 MG: 20 TABLET ORAL at 22:08

## 2022-10-25 RX ADMIN — FUROSEMIDE 40 MG: 40 TABLET ORAL at 09:52

## 2022-10-25 RX ADMIN — DOCUSATE SODIUM 100 MG: 100 CAPSULE, LIQUID FILLED ORAL at 22:08

## 2022-10-25 RX ADMIN — LACTULOSE 20 G: 20 SOLUTION ORAL at 09:51

## 2022-10-25 RX ADMIN — LEVOFLOXACIN 750 MG: 5 INJECTION, SOLUTION INTRAVENOUS at 17:31

## 2022-10-25 RX ADMIN — BISACODYL 10 MG: 10 SUPPOSITORY RECTAL at 09:52

## 2022-10-25 RX ADMIN — SPIRONOLACTONE 50 MG: 50 TABLET, FILM COATED ORAL at 22:08

## 2022-10-25 RX ADMIN — INSULIN LISPRO 2 UNITS: 100 INJECTION, SOLUTION INTRAVENOUS; SUBCUTANEOUS at 11:23

## 2022-10-26 ENCOUNTER — APPOINTMENT (OUTPATIENT)
Dept: MRI IMAGING | Facility: HOSPITAL | Age: 82
End: 2022-10-26

## 2022-10-26 LAB
ALBUMIN SERPL-MCNC: 2.6 G/DL (ref 3.5–5.2)
ALBUMIN/GLOB SERPL: 1.2 G/DL
ALP SERPL-CCNC: 88 U/L (ref 39–117)
ALT SERPL W P-5'-P-CCNC: 20 U/L (ref 1–41)
ANION GAP SERPL CALCULATED.3IONS-SCNC: 11 MMOL/L (ref 5–15)
AST SERPL-CCNC: 47 U/L (ref 1–40)
BACTERIA SPEC AEROBE CULT: NORMAL
BACTERIA SPEC AEROBE CULT: NORMAL
BASOPHILS # BLD AUTO: 0.03 10*3/MM3 (ref 0–0.2)
BASOPHILS NFR BLD AUTO: 0.6 % (ref 0–1.5)
BILIRUB SERPL-MCNC: 2.7 MG/DL (ref 0–1.2)
BUN SERPL-MCNC: 6 MG/DL (ref 8–23)
BUN/CREAT SERPL: 6.5 (ref 7–25)
CALCIUM SPEC-SCNC: 7.8 MG/DL (ref 8.6–10.5)
CHLORIDE SERPL-SCNC: 99 MMOL/L (ref 98–107)
CO2 SERPL-SCNC: 23 MMOL/L (ref 22–29)
CREAT SERPL-MCNC: 0.93 MG/DL (ref 0.76–1.27)
DEPRECATED RDW RBC AUTO: 50.1 FL (ref 37–54)
EGFRCR SERPLBLD CKD-EPI 2021: 82.5 ML/MIN/1.73
EOSINOPHIL # BLD AUTO: 0.37 10*3/MM3 (ref 0–0.4)
EOSINOPHIL NFR BLD AUTO: 7.6 % (ref 0.3–6.2)
ERYTHROCYTE [DISTWIDTH] IN BLOOD BY AUTOMATED COUNT: 13.7 % (ref 12.3–15.4)
GLOBULIN UR ELPH-MCNC: 2.1 GM/DL
GLUCOSE BLDC GLUCOMTR-MCNC: 127 MG/DL (ref 70–130)
GLUCOSE BLDC GLUCOMTR-MCNC: 127 MG/DL (ref 70–130)
GLUCOSE BLDC GLUCOMTR-MCNC: 131 MG/DL (ref 70–130)
GLUCOSE BLDC GLUCOMTR-MCNC: 159 MG/DL (ref 70–130)
GLUCOSE SERPL-MCNC: 115 MG/DL (ref 65–99)
HCT VFR BLD AUTO: 28.9 % (ref 37.5–51)
HGB BLD-MCNC: 10.2 G/DL (ref 13–17.7)
IMM GRANULOCYTES # BLD AUTO: 0.02 10*3/MM3 (ref 0–0.05)
IMM GRANULOCYTES NFR BLD AUTO: 0.4 % (ref 0–0.5)
LYMPHOCYTES # BLD AUTO: 1.05 10*3/MM3 (ref 0.7–3.1)
LYMPHOCYTES NFR BLD AUTO: 21.5 % (ref 19.6–45.3)
MCH RBC QN AUTO: 35.5 PG (ref 26.6–33)
MCHC RBC AUTO-ENTMCNC: 35.3 G/DL (ref 31.5–35.7)
MCV RBC AUTO: 100.7 FL (ref 79–97)
MONOCYTES # BLD AUTO: 0.55 10*3/MM3 (ref 0.1–0.9)
MONOCYTES NFR BLD AUTO: 11.2 % (ref 5–12)
NEUTROPHILS NFR BLD AUTO: 2.87 10*3/MM3 (ref 1.7–7)
NEUTROPHILS NFR BLD AUTO: 58.7 % (ref 42.7–76)
NRBC BLD AUTO-RTO: 0 /100 WBC (ref 0–0.2)
PLATELET # BLD AUTO: 97 10*3/MM3 (ref 140–450)
PMV BLD AUTO: 9 FL (ref 6–12)
POTASSIUM SERPL-SCNC: 3.5 MMOL/L (ref 3.5–5.2)
PROT SERPL-MCNC: 4.7 G/DL (ref 6–8.5)
RBC # BLD AUTO: 2.87 10*6/MM3 (ref 4.14–5.8)
SODIUM SERPL-SCNC: 133 MMOL/L (ref 136–145)
WBC NRBC COR # BLD: 4.89 10*3/MM3 (ref 3.4–10.8)

## 2022-10-26 PROCEDURE — G0378 HOSPITAL OBSERVATION PER HR: HCPCS

## 2022-10-26 PROCEDURE — 80053 COMPREHEN METABOLIC PANEL: CPT | Performed by: INTERNAL MEDICINE

## 2022-10-26 PROCEDURE — 82962 GLUCOSE BLOOD TEST: CPT

## 2022-10-26 PROCEDURE — 97110 THERAPEUTIC EXERCISES: CPT

## 2022-10-26 PROCEDURE — 85025 COMPLETE CBC W/AUTO DIFF WBC: CPT | Performed by: INTERNAL MEDICINE

## 2022-10-26 PROCEDURE — 74183 MRI ABD W/O CNTR FLWD CNTR: CPT

## 2022-10-26 PROCEDURE — 99214 OFFICE O/P EST MOD 30 MIN: CPT | Performed by: PHYSICIAN ASSISTANT

## 2022-10-26 PROCEDURE — A9577 INJ MULTIHANCE: HCPCS | Performed by: INTERNAL MEDICINE

## 2022-10-26 PROCEDURE — 0 GADOBENATE DIMEGLUMINE 529 MG/ML SOLUTION: Performed by: INTERNAL MEDICINE

## 2022-10-26 RX ADMIN — DOCUSATE SODIUM 50MG AND SENNOSIDES 8.6MG 2 TABLET: 8.6; 5 TABLET, FILM COATED ORAL at 09:18

## 2022-10-26 RX ADMIN — LACTULOSE 20 G: 20 SOLUTION ORAL at 09:15

## 2022-10-26 RX ADMIN — Medication 10 ML: at 21:11

## 2022-10-26 RX ADMIN — DOCUSATE SODIUM 100 MG: 100 CAPSULE, LIQUID FILLED ORAL at 09:18

## 2022-10-26 RX ADMIN — FUROSEMIDE 60 MG: 40 TABLET ORAL at 09:14

## 2022-10-26 RX ADMIN — Medication 500 MCG: at 09:15

## 2022-10-26 RX ADMIN — OXYCODONE HYDROCHLORIDE AND ACETAMINOPHEN 1 TABLET: 7.5; 325 TABLET ORAL at 07:23

## 2022-10-26 RX ADMIN — FINASTERIDE 5 MG: 5 TABLET, FILM COATED ORAL at 09:15

## 2022-10-26 RX ADMIN — OXYCODONE HYDROCHLORIDE AND ACETAMINOPHEN 1 TABLET: 7.5; 325 TABLET ORAL at 21:07

## 2022-10-26 RX ADMIN — Medication 10 ML: at 09:19

## 2022-10-26 RX ADMIN — Medication 5 MG: at 21:07

## 2022-10-26 RX ADMIN — LACTULOSE 20 G: 20 SOLUTION ORAL at 15:19

## 2022-10-26 RX ADMIN — DOCUSATE SODIUM 100 MG: 100 CAPSULE, LIQUID FILLED ORAL at 21:07

## 2022-10-26 RX ADMIN — SPIRONOLACTONE 100 MG: 100 TABLET ORAL at 09:14

## 2022-10-26 RX ADMIN — OXYCODONE HYDROCHLORIDE AND ACETAMINOPHEN 1 TABLET: 7.5; 325 TABLET ORAL at 15:18

## 2022-10-26 RX ADMIN — GADOBENATE DIMEGLUMINE 20 ML: 529 INJECTION, SOLUTION INTRAVENOUS at 16:28

## 2022-10-26 RX ADMIN — LACTULOSE 20 G: 20 SOLUTION ORAL at 21:07

## 2022-10-26 RX ADMIN — CARIPRAZINE 4.5 MG: 4.5 CAPSULE, GELATIN COATED ORAL at 17:18

## 2022-10-27 ENCOUNTER — APPOINTMENT (OUTPATIENT)
Dept: ULTRASOUND IMAGING | Facility: HOSPITAL | Age: 82
End: 2022-10-27

## 2022-10-27 ENCOUNTER — TELEPHONE (OUTPATIENT)
Dept: GASTROENTEROLOGY | Facility: CLINIC | Age: 82
End: 2022-10-27

## 2022-10-27 ENCOUNTER — APPOINTMENT (OUTPATIENT)
Dept: MRI IMAGING | Facility: HOSPITAL | Age: 82
End: 2022-10-27

## 2022-10-27 PROBLEM — D37.6 NEOPLASM OF UNCERTAIN BEHAVIOR OF LIVER: Status: ACTIVE | Noted: 2022-10-27

## 2022-10-27 LAB
ALBUMIN SERPL-MCNC: 2.5 G/DL (ref 3.5–5.2)
ALBUMIN/GLOB SERPL: 1.1 G/DL
ALP SERPL-CCNC: 87 U/L (ref 39–117)
ALT SERPL W P-5'-P-CCNC: 19 U/L (ref 1–41)
ANION GAP SERPL CALCULATED.3IONS-SCNC: 10.7 MMOL/L (ref 5–15)
AST SERPL-CCNC: 43 U/L (ref 1–40)
BASOPHILS # BLD AUTO: 0.04 10*3/MM3 (ref 0–0.2)
BASOPHILS NFR BLD AUTO: 0.9 % (ref 0–1.5)
BILIRUB SERPL-MCNC: 2.3 MG/DL (ref 0–1.2)
BUN SERPL-MCNC: 7 MG/DL (ref 8–23)
BUN/CREAT SERPL: 8.6 (ref 7–25)
CALCIUM SPEC-SCNC: 7.8 MG/DL (ref 8.6–10.5)
CHLORIDE SERPL-SCNC: 100 MMOL/L (ref 98–107)
CO2 SERPL-SCNC: 26.3 MMOL/L (ref 22–29)
CREAT SERPL-MCNC: 0.81 MG/DL (ref 0.76–1.27)
DEPRECATED RDW RBC AUTO: 51.1 FL (ref 37–54)
EGFRCR SERPLBLD CKD-EPI 2021: 88.6 ML/MIN/1.73
EOSINOPHIL # BLD AUTO: 0.35 10*3/MM3 (ref 0–0.4)
EOSINOPHIL NFR BLD AUTO: 7.8 % (ref 0.3–6.2)
ERYTHROCYTE [DISTWIDTH] IN BLOOD BY AUTOMATED COUNT: 14 % (ref 12.3–15.4)
GLOBULIN UR ELPH-MCNC: 2.3 GM/DL
GLUCOSE BLDC GLUCOMTR-MCNC: 140 MG/DL (ref 70–130)
GLUCOSE BLDC GLUCOMTR-MCNC: 142 MG/DL (ref 70–130)
GLUCOSE BLDC GLUCOMTR-MCNC: 172 MG/DL (ref 70–130)
GLUCOSE BLDC GLUCOMTR-MCNC: 189 MG/DL (ref 70–130)
GLUCOSE BLDC GLUCOMTR-MCNC: 246 MG/DL (ref 70–130)
GLUCOSE SERPL-MCNC: 138 MG/DL (ref 65–99)
HCT VFR BLD AUTO: 27.8 % (ref 37.5–51)
HGB BLD-MCNC: 9.6 G/DL (ref 13–17.7)
IMM GRANULOCYTES # BLD AUTO: 0.03 10*3/MM3 (ref 0–0.05)
IMM GRANULOCYTES NFR BLD AUTO: 0.7 % (ref 0–0.5)
LYMPHOCYTES # BLD AUTO: 1.26 10*3/MM3 (ref 0.7–3.1)
LYMPHOCYTES NFR BLD AUTO: 28.3 % (ref 19.6–45.3)
MCH RBC QN AUTO: 35.3 PG (ref 26.6–33)
MCHC RBC AUTO-ENTMCNC: 34.5 G/DL (ref 31.5–35.7)
MCV RBC AUTO: 102.2 FL (ref 79–97)
MONOCYTES # BLD AUTO: 0.54 10*3/MM3 (ref 0.1–0.9)
MONOCYTES NFR BLD AUTO: 12.1 % (ref 5–12)
NEUTROPHILS NFR BLD AUTO: 2.24 10*3/MM3 (ref 1.7–7)
NEUTROPHILS NFR BLD AUTO: 50.2 % (ref 42.7–76)
NRBC BLD AUTO-RTO: 0 /100 WBC (ref 0–0.2)
PLATELET # BLD AUTO: 105 10*3/MM3 (ref 140–450)
PMV BLD AUTO: 9 FL (ref 6–12)
POTASSIUM SERPL-SCNC: 3.5 MMOL/L (ref 3.5–5.2)
PROT SERPL-MCNC: 4.8 G/DL (ref 6–8.5)
RBC # BLD AUTO: 2.72 10*6/MM3 (ref 4.14–5.8)
SODIUM SERPL-SCNC: 137 MMOL/L (ref 136–145)
WBC NRBC COR # BLD: 4.46 10*3/MM3 (ref 3.4–10.8)

## 2022-10-27 PROCEDURE — 99232 SBSQ HOSP IP/OBS MODERATE 35: CPT | Performed by: NURSE PRACTITIONER

## 2022-10-27 PROCEDURE — 63710000001 INSULIN LISPRO (HUMAN) PER 5 UNITS: Performed by: NURSE PRACTITIONER

## 2022-10-27 PROCEDURE — 0 GADOBENATE DIMEGLUMINE 529 MG/ML SOLUTION: Performed by: INTERNAL MEDICINE

## 2022-10-27 PROCEDURE — 85025 COMPLETE CBC W/AUTO DIFF WBC: CPT | Performed by: INTERNAL MEDICINE

## 2022-10-27 PROCEDURE — 80053 COMPREHEN METABOLIC PANEL: CPT | Performed by: INTERNAL MEDICINE

## 2022-10-27 PROCEDURE — 82962 GLUCOSE BLOOD TEST: CPT

## 2022-10-27 PROCEDURE — 74183 MRI ABD W/O CNTR FLWD CNTR: CPT

## 2022-10-27 PROCEDURE — A9577 INJ MULTIHANCE: HCPCS | Performed by: INTERNAL MEDICINE

## 2022-10-27 RX ADMIN — FINASTERIDE 5 MG: 5 TABLET, FILM COATED ORAL at 09:29

## 2022-10-27 RX ADMIN — DOCUSATE SODIUM 50MG AND SENNOSIDES 8.6MG 2 TABLET: 8.6; 5 TABLET, FILM COATED ORAL at 09:29

## 2022-10-27 RX ADMIN — Medication 5 MG: at 22:01

## 2022-10-27 RX ADMIN — Medication 10 ML: at 22:01

## 2022-10-27 RX ADMIN — SPIRONOLACTONE 100 MG: 100 TABLET ORAL at 09:29

## 2022-10-27 RX ADMIN — CARIPRAZINE 4.5 MG: 4.5 CAPSULE, GELATIN COATED ORAL at 17:38

## 2022-10-27 RX ADMIN — DOCUSATE SODIUM 100 MG: 100 CAPSULE, LIQUID FILLED ORAL at 22:01

## 2022-10-27 RX ADMIN — Medication 500 MCG: at 09:29

## 2022-10-27 RX ADMIN — LACTULOSE 20 G: 20 SOLUTION ORAL at 17:38

## 2022-10-27 RX ADMIN — GADOBENATE DIMEGLUMINE 20 ML: 529 INJECTION, SOLUTION INTRAVENOUS at 17:08

## 2022-10-27 RX ADMIN — Medication 10 ML: at 09:30

## 2022-10-27 RX ADMIN — BISACODYL 10 MG: 10 SUPPOSITORY RECTAL at 09:29

## 2022-10-27 RX ADMIN — LACTULOSE 20 G: 20 SOLUTION ORAL at 22:01

## 2022-10-27 RX ADMIN — LACTULOSE 20 G: 20 SOLUTION ORAL at 09:29

## 2022-10-27 RX ADMIN — INSULIN LISPRO 2 UNITS: 100 INJECTION, SOLUTION INTRAVENOUS; SUBCUTANEOUS at 11:31

## 2022-10-27 RX ADMIN — INSULIN LISPRO 2 UNITS: 100 INJECTION, SOLUTION INTRAVENOUS; SUBCUTANEOUS at 17:45

## 2022-10-27 RX ADMIN — DOCUSATE SODIUM 100 MG: 100 CAPSULE, LIQUID FILLED ORAL at 09:29

## 2022-10-27 RX ADMIN — FUROSEMIDE 60 MG: 40 TABLET ORAL at 09:29

## 2022-10-27 NOTE — TELEPHONE ENCOUNTER
Caller: DAVI    Relationship to patient: DR. JUAN Montero call back number: [phone]    Patient is needing: DR. MARTINEZ NEEDS TO SPEAK WITH OSEI MICHEL ABOUT PT   PT WILL NOT BE RELEASED FROM THE HOSPITAL UNTIL THEY ARE CALLED

## 2022-10-28 PROBLEM — E87.6 HYPOKALEMIA: Status: ACTIVE | Noted: 2022-10-28

## 2022-10-28 LAB
ALBUMIN SERPL-MCNC: 2.6 G/DL (ref 3.5–5.2)
ALBUMIN/GLOB SERPL: 1.1 G/DL
ALP SERPL-CCNC: 86 U/L (ref 39–117)
ALT SERPL W P-5'-P-CCNC: 17 U/L (ref 1–41)
ANION GAP SERPL CALCULATED.3IONS-SCNC: 10 MMOL/L (ref 5–15)
AST SERPL-CCNC: 47 U/L (ref 1–40)
BASOPHILS # BLD AUTO: 0.03 10*3/MM3 (ref 0–0.2)
BASOPHILS NFR BLD AUTO: 0.7 % (ref 0–1.5)
BILIRUB SERPL-MCNC: 2.2 MG/DL (ref 0–1.2)
BUN SERPL-MCNC: 5 MG/DL (ref 8–23)
BUN/CREAT SERPL: 7.2 (ref 7–25)
CALCIUM SPEC-SCNC: 8 MG/DL (ref 8.6–10.5)
CHLORIDE SERPL-SCNC: 99 MMOL/L (ref 98–107)
CO2 SERPL-SCNC: 25 MMOL/L (ref 22–29)
CREAT SERPL-MCNC: 0.69 MG/DL (ref 0.76–1.27)
DEPRECATED RDW RBC AUTO: 48.9 FL (ref 37–54)
EGFRCR SERPLBLD CKD-EPI 2021: 93 ML/MIN/1.73
EOSINOPHIL # BLD AUTO: 0.3 10*3/MM3 (ref 0–0.4)
EOSINOPHIL NFR BLD AUTO: 6.8 % (ref 0.3–6.2)
ERYTHROCYTE [DISTWIDTH] IN BLOOD BY AUTOMATED COUNT: 13.3 % (ref 12.3–15.4)
FERRITIN SERPL-MCNC: 645 NG/ML (ref 30–400)
GLOBULIN UR ELPH-MCNC: 2.3 GM/DL
GLUCOSE BLDC GLUCOMTR-MCNC: 134 MG/DL (ref 70–130)
GLUCOSE BLDC GLUCOMTR-MCNC: 164 MG/DL (ref 70–130)
GLUCOSE BLDC GLUCOMTR-MCNC: 194 MG/DL (ref 70–130)
GLUCOSE BLDC GLUCOMTR-MCNC: 221 MG/DL (ref 70–130)
GLUCOSE SERPL-MCNC: 126 MG/DL (ref 65–99)
HCT VFR BLD AUTO: 27.6 % (ref 37.5–51)
HGB BLD-MCNC: 9.9 G/DL (ref 13–17.7)
IMM GRANULOCYTES # BLD AUTO: 0.04 10*3/MM3 (ref 0–0.05)
IMM GRANULOCYTES NFR BLD AUTO: 0.9 % (ref 0–0.5)
IRON 24H UR-MRATE: 91 MCG/DL (ref 59–158)
IRON SATN MFR SERPL: 41 % (ref 20–50)
LYMPHOCYTES # BLD AUTO: 1.41 10*3/MM3 (ref 0.7–3.1)
LYMPHOCYTES NFR BLD AUTO: 32 % (ref 19.6–45.3)
MAGNESIUM SERPL-MCNC: 2 MG/DL (ref 1.6–2.4)
MCH RBC QN AUTO: 35.5 PG (ref 26.6–33)
MCHC RBC AUTO-ENTMCNC: 35.9 G/DL (ref 31.5–35.7)
MCV RBC AUTO: 98.9 FL (ref 79–97)
MONOCYTES # BLD AUTO: 0.52 10*3/MM3 (ref 0.1–0.9)
MONOCYTES NFR BLD AUTO: 11.8 % (ref 5–12)
NEUTROPHILS NFR BLD AUTO: 2.1 10*3/MM3 (ref 1.7–7)
NEUTROPHILS NFR BLD AUTO: 47.8 % (ref 42.7–76)
NRBC BLD AUTO-RTO: 0.2 /100 WBC (ref 0–0.2)
PLATELET # BLD AUTO: 101 10*3/MM3 (ref 140–450)
PMV BLD AUTO: 9.1 FL (ref 6–12)
POTASSIUM SERPL-SCNC: 3.3 MMOL/L (ref 3.5–5.2)
PROT SERPL-MCNC: 4.9 G/DL (ref 6–8.5)
RBC # BLD AUTO: 2.79 10*6/MM3 (ref 4.14–5.8)
SODIUM SERPL-SCNC: 134 MMOL/L (ref 136–145)
TIBC SERPL-MCNC: 224 MCG/DL (ref 298–536)
TRANSFERRIN SERPL-MCNC: 150 MG/DL (ref 200–360)
WBC NRBC COR # BLD: 4.4 10*3/MM3 (ref 3.4–10.8)

## 2022-10-28 PROCEDURE — 85025 COMPLETE CBC W/AUTO DIFF WBC: CPT | Performed by: INTERNAL MEDICINE

## 2022-10-28 PROCEDURE — 82728 ASSAY OF FERRITIN: CPT | Performed by: INTERNAL MEDICINE

## 2022-10-28 PROCEDURE — 99223 1ST HOSP IP/OBS HIGH 75: CPT | Performed by: INTERNAL MEDICINE

## 2022-10-28 PROCEDURE — 83735 ASSAY OF MAGNESIUM: CPT | Performed by: INTERNAL MEDICINE

## 2022-10-28 PROCEDURE — 84466 ASSAY OF TRANSFERRIN: CPT | Performed by: INTERNAL MEDICINE

## 2022-10-28 PROCEDURE — 83540 ASSAY OF IRON: CPT | Performed by: INTERNAL MEDICINE

## 2022-10-28 PROCEDURE — 63710000001 INSULIN LISPRO (HUMAN) PER 5 UNITS: Performed by: NURSE PRACTITIONER

## 2022-10-28 PROCEDURE — 80053 COMPREHEN METABOLIC PANEL: CPT | Performed by: INTERNAL MEDICINE

## 2022-10-28 PROCEDURE — 82962 GLUCOSE BLOOD TEST: CPT

## 2022-10-28 RX ORDER — MAGNESIUM SULFATE HEPTAHYDRATE 40 MG/ML
4 INJECTION, SOLUTION INTRAVENOUS AS NEEDED
Status: DISCONTINUED | OUTPATIENT
Start: 2022-10-28 | End: 2022-11-01 | Stop reason: HOSPADM

## 2022-10-28 RX ORDER — POTASSIUM CHLORIDE 1.5 G/1.77G
40 POWDER, FOR SOLUTION ORAL AS NEEDED
Status: DISCONTINUED | OUTPATIENT
Start: 2022-10-28 | End: 2022-11-01 | Stop reason: HOSPADM

## 2022-10-28 RX ORDER — MAGNESIUM SULFATE HEPTAHYDRATE 40 MG/ML
2 INJECTION, SOLUTION INTRAVENOUS AS NEEDED
Status: DISCONTINUED | OUTPATIENT
Start: 2022-10-28 | End: 2022-11-01 | Stop reason: HOSPADM

## 2022-10-28 RX ORDER — POTASSIUM CHLORIDE 750 MG/1
40 TABLET, FILM COATED, EXTENDED RELEASE ORAL AS NEEDED
Status: DISCONTINUED | OUTPATIENT
Start: 2022-10-28 | End: 2022-11-01 | Stop reason: HOSPADM

## 2022-10-28 RX ADMIN — SPIRONOLACTONE 100 MG: 100 TABLET ORAL at 09:12

## 2022-10-28 RX ADMIN — Medication 5 MG: at 20:12

## 2022-10-28 RX ADMIN — LACTULOSE 20 G: 20 SOLUTION ORAL at 09:10

## 2022-10-28 RX ADMIN — OXYCODONE HYDROCHLORIDE AND ACETAMINOPHEN 1 TABLET: 7.5; 325 TABLET ORAL at 20:12

## 2022-10-28 RX ADMIN — Medication 10 ML: at 09:13

## 2022-10-28 RX ADMIN — INSULIN LISPRO 2 UNITS: 100 INJECTION, SOLUTION INTRAVENOUS; SUBCUTANEOUS at 17:12

## 2022-10-28 RX ADMIN — BISACODYL 10 MG: 10 SUPPOSITORY RECTAL at 09:12

## 2022-10-28 RX ADMIN — DOCUSATE SODIUM 100 MG: 100 CAPSULE, LIQUID FILLED ORAL at 20:12

## 2022-10-28 RX ADMIN — FINASTERIDE 5 MG: 5 TABLET, FILM COATED ORAL at 09:12

## 2022-10-28 RX ADMIN — DOCUSATE SODIUM 100 MG: 100 CAPSULE, LIQUID FILLED ORAL at 09:12

## 2022-10-28 RX ADMIN — POTASSIUM CHLORIDE 40 MEQ: 750 TABLET, EXTENDED RELEASE ORAL at 20:12

## 2022-10-28 RX ADMIN — DOCUSATE SODIUM 50MG AND SENNOSIDES 8.6MG 2 TABLET: 8.6; 5 TABLET, FILM COATED ORAL at 09:11

## 2022-10-28 RX ADMIN — FUROSEMIDE 60 MG: 40 TABLET ORAL at 09:11

## 2022-10-28 RX ADMIN — CARIPRAZINE 4.5 MG: 4.5 CAPSULE, GELATIN COATED ORAL at 17:12

## 2022-10-28 RX ADMIN — Medication 10 ML: at 20:12

## 2022-10-28 RX ADMIN — LACTULOSE 20 G: 20 SOLUTION ORAL at 17:12

## 2022-10-28 RX ADMIN — LACTULOSE 20 G: 20 SOLUTION ORAL at 20:12

## 2022-10-28 RX ADMIN — INSULIN LISPRO 2 UNITS: 100 INJECTION, SOLUTION INTRAVENOUS; SUBCUTANEOUS at 11:56

## 2022-10-28 RX ADMIN — Medication 500 MCG: at 09:11

## 2022-10-29 LAB
ALBUMIN SERPL-MCNC: 2.7 G/DL (ref 3.5–5.2)
ALBUMIN/GLOB SERPL: 1.2 G/DL
ALP SERPL-CCNC: 102 U/L (ref 39–117)
ALT SERPL W P-5'-P-CCNC: 21 U/L (ref 1–41)
ANION GAP SERPL CALCULATED.3IONS-SCNC: 8.6 MMOL/L (ref 5–15)
APTT PPP: 40.9 SECONDS (ref 22.7–35.4)
AST SERPL-CCNC: 56 U/L (ref 1–40)
BASOPHILS # BLD AUTO: 0.05 10*3/MM3 (ref 0–0.2)
BASOPHILS NFR BLD AUTO: 1.2 % (ref 0–1.5)
BILIRUB SERPL-MCNC: 2.1 MG/DL (ref 0–1.2)
BUN SERPL-MCNC: 5 MG/DL (ref 8–23)
BUN/CREAT SERPL: 7.7 (ref 7–25)
CALCIUM SPEC-SCNC: 8.2 MG/DL (ref 8.6–10.5)
CHLORIDE SERPL-SCNC: 102 MMOL/L (ref 98–107)
CO2 SERPL-SCNC: 24.4 MMOL/L (ref 22–29)
CREAT SERPL-MCNC: 0.65 MG/DL (ref 0.76–1.27)
DEPRECATED RDW RBC AUTO: 50.2 FL (ref 37–54)
EGFRCR SERPLBLD CKD-EPI 2021: 94.7 ML/MIN/1.73
EOSINOPHIL # BLD AUTO: 0.31 10*3/MM3 (ref 0–0.4)
EOSINOPHIL NFR BLD AUTO: 7.3 % (ref 0.3–6.2)
ERYTHROCYTE [DISTWIDTH] IN BLOOD BY AUTOMATED COUNT: 13.6 % (ref 12.3–15.4)
GLOBULIN UR ELPH-MCNC: 2.3 GM/DL
GLUCOSE BLDC GLUCOMTR-MCNC: 158 MG/DL (ref 70–130)
GLUCOSE BLDC GLUCOMTR-MCNC: 168 MG/DL (ref 70–130)
GLUCOSE BLDC GLUCOMTR-MCNC: 218 MG/DL (ref 70–130)
GLUCOSE BLDC GLUCOMTR-MCNC: 231 MG/DL (ref 70–130)
GLUCOSE SERPL-MCNC: 239 MG/DL (ref 65–99)
HCT VFR BLD AUTO: 28.6 % (ref 37.5–51)
HGB BLD-MCNC: 10.1 G/DL (ref 13–17.7)
IMM GRANULOCYTES # BLD AUTO: 0.03 10*3/MM3 (ref 0–0.05)
IMM GRANULOCYTES NFR BLD AUTO: 0.7 % (ref 0–0.5)
INR PPP: 1.34 (ref 0.9–1.1)
LYMPHOCYTES # BLD AUTO: 1.28 10*3/MM3 (ref 0.7–3.1)
LYMPHOCYTES NFR BLD AUTO: 30.1 % (ref 19.6–45.3)
MCH RBC QN AUTO: 35.1 PG (ref 26.6–33)
MCHC RBC AUTO-ENTMCNC: 35.3 G/DL (ref 31.5–35.7)
MCV RBC AUTO: 99.3 FL (ref 79–97)
MONOCYTES # BLD AUTO: 0.54 10*3/MM3 (ref 0.1–0.9)
MONOCYTES NFR BLD AUTO: 12.7 % (ref 5–12)
NEUTROPHILS NFR BLD AUTO: 2.04 10*3/MM3 (ref 1.7–7)
NEUTROPHILS NFR BLD AUTO: 48 % (ref 42.7–76)
NRBC BLD AUTO-RTO: 0 /100 WBC (ref 0–0.2)
PLATELET # BLD AUTO: 100 10*3/MM3 (ref 140–450)
PMV BLD AUTO: 9.1 FL (ref 6–12)
POTASSIUM SERPL-SCNC: 3.9 MMOL/L (ref 3.5–5.2)
PROT SERPL-MCNC: 5 G/DL (ref 6–8.5)
PROTHROMBIN TIME: 16.7 SECONDS (ref 11.7–14.2)
RBC # BLD AUTO: 2.88 10*6/MM3 (ref 4.14–5.8)
SODIUM SERPL-SCNC: 135 MMOL/L (ref 136–145)
WBC NRBC COR # BLD: 4.25 10*3/MM3 (ref 3.4–10.8)

## 2022-10-29 PROCEDURE — 99232 SBSQ HOSP IP/OBS MODERATE 35: CPT | Performed by: INTERNAL MEDICINE

## 2022-10-29 PROCEDURE — 85730 THROMBOPLASTIN TIME PARTIAL: CPT | Performed by: INTERNAL MEDICINE

## 2022-10-29 PROCEDURE — 63710000001 INSULIN LISPRO (HUMAN) PER 5 UNITS: Performed by: NURSE PRACTITIONER

## 2022-10-29 PROCEDURE — 80053 COMPREHEN METABOLIC PANEL: CPT | Performed by: INTERNAL MEDICINE

## 2022-10-29 PROCEDURE — 85610 PROTHROMBIN TIME: CPT | Performed by: INTERNAL MEDICINE

## 2022-10-29 PROCEDURE — 82962 GLUCOSE BLOOD TEST: CPT

## 2022-10-29 PROCEDURE — 0FB13ZX EXCISION OF RIGHT LOBE LIVER, PERCUTANEOUS APPROACH, DIAGNOSTIC: ICD-10-PCS | Performed by: RADIOLOGY

## 2022-10-29 PROCEDURE — 99233 SBSQ HOSP IP/OBS HIGH 50: CPT | Performed by: INTERNAL MEDICINE

## 2022-10-29 PROCEDURE — 85025 COMPLETE CBC W/AUTO DIFF WBC: CPT | Performed by: INTERNAL MEDICINE

## 2022-10-29 RX ADMIN — Medication 5 MG: at 21:24

## 2022-10-29 RX ADMIN — LACTULOSE 20 G: 20 SOLUTION ORAL at 21:24

## 2022-10-29 RX ADMIN — DOCUSATE SODIUM 100 MG: 100 CAPSULE, LIQUID FILLED ORAL at 09:22

## 2022-10-29 RX ADMIN — LACTULOSE 20 G: 20 SOLUTION ORAL at 17:00

## 2022-10-29 RX ADMIN — Medication 500 MCG: at 09:23

## 2022-10-29 RX ADMIN — POTASSIUM CHLORIDE 40 MEQ: 750 TABLET, EXTENDED RELEASE ORAL at 00:34

## 2022-10-29 RX ADMIN — ACETAMINOPHEN 650 MG: 325 TABLET, FILM COATED ORAL at 17:01

## 2022-10-29 RX ADMIN — SPIRONOLACTONE 100 MG: 100 TABLET ORAL at 09:22

## 2022-10-29 RX ADMIN — FUROSEMIDE 60 MG: 40 TABLET ORAL at 09:23

## 2022-10-29 RX ADMIN — Medication 10 ML: at 09:28

## 2022-10-29 RX ADMIN — Medication 10 ML: at 21:25

## 2022-10-29 RX ADMIN — DOCUSATE SODIUM 100 MG: 100 CAPSULE, LIQUID FILLED ORAL at 21:24

## 2022-10-29 RX ADMIN — INSULIN LISPRO 3 UNITS: 100 INJECTION, SOLUTION INTRAVENOUS; SUBCUTANEOUS at 09:22

## 2022-10-29 RX ADMIN — INSULIN LISPRO 3 UNITS: 100 INJECTION, SOLUTION INTRAVENOUS; SUBCUTANEOUS at 13:56

## 2022-10-29 RX ADMIN — LACTULOSE 20 G: 20 SOLUTION ORAL at 09:20

## 2022-10-29 RX ADMIN — DOCUSATE SODIUM 50MG AND SENNOSIDES 8.6MG 2 TABLET: 8.6; 5 TABLET, FILM COATED ORAL at 09:22

## 2022-10-29 RX ADMIN — CARIPRAZINE 4.5 MG: 4.5 CAPSULE, GELATIN COATED ORAL at 17:00

## 2022-10-29 RX ADMIN — FINASTERIDE 5 MG: 5 TABLET, FILM COATED ORAL at 09:23

## 2022-10-29 RX ADMIN — INSULIN LISPRO 2 UNITS: 100 INJECTION, SOLUTION INTRAVENOUS; SUBCUTANEOUS at 16:59

## 2022-10-30 LAB
ALBUMIN SERPL-MCNC: 2.6 G/DL (ref 3.5–5.2)
ALBUMIN/GLOB SERPL: 1 G/DL
ALP SERPL-CCNC: 107 U/L (ref 39–117)
ALT SERPL W P-5'-P-CCNC: 26 U/L (ref 1–41)
ANION GAP SERPL CALCULATED.3IONS-SCNC: 8.3 MMOL/L (ref 5–15)
AST SERPL-CCNC: 57 U/L (ref 1–40)
BASOPHILS # BLD AUTO: 0.05 10*3/MM3 (ref 0–0.2)
BASOPHILS NFR BLD AUTO: 0.6 % (ref 0–1.5)
BILIRUB SERPL-MCNC: 3.1 MG/DL (ref 0–1.2)
BUN SERPL-MCNC: 5 MG/DL (ref 8–23)
BUN/CREAT SERPL: 6.8 (ref 7–25)
CALCIUM SPEC-SCNC: 8.2 MG/DL (ref 8.6–10.5)
CHLORIDE SERPL-SCNC: 99 MMOL/L (ref 98–107)
CO2 SERPL-SCNC: 23.7 MMOL/L (ref 22–29)
CREAT SERPL-MCNC: 0.74 MG/DL (ref 0.76–1.27)
DEPRECATED RDW RBC AUTO: 49 FL (ref 37–54)
EGFRCR SERPLBLD CKD-EPI 2021: 91 ML/MIN/1.73
EOSINOPHIL # BLD AUTO: 0.22 10*3/MM3 (ref 0–0.4)
EOSINOPHIL NFR BLD AUTO: 2.6 % (ref 0.3–6.2)
ERYTHROCYTE [DISTWIDTH] IN BLOOD BY AUTOMATED COUNT: 13.7 % (ref 12.3–15.4)
GLOBULIN UR ELPH-MCNC: 2.6 GM/DL
GLUCOSE BLDC GLUCOMTR-MCNC: 141 MG/DL (ref 70–130)
GLUCOSE BLDC GLUCOMTR-MCNC: 185 MG/DL (ref 70–130)
GLUCOSE BLDC GLUCOMTR-MCNC: 186 MG/DL (ref 70–130)
GLUCOSE BLDC GLUCOMTR-MCNC: 196 MG/DL (ref 70–130)
GLUCOSE SERPL-MCNC: 131 MG/DL (ref 65–99)
HCT VFR BLD AUTO: 29.2 % (ref 37.5–51)
HGB BLD-MCNC: 10.4 G/DL (ref 13–17.7)
IMM GRANULOCYTES # BLD AUTO: 0.04 10*3/MM3 (ref 0–0.05)
IMM GRANULOCYTES NFR BLD AUTO: 0.5 % (ref 0–0.5)
LYMPHOCYTES # BLD AUTO: 1.74 10*3/MM3 (ref 0.7–3.1)
LYMPHOCYTES NFR BLD AUTO: 20.5 % (ref 19.6–45.3)
MCH RBC QN AUTO: 34.8 PG (ref 26.6–33)
MCHC RBC AUTO-ENTMCNC: 35.6 G/DL (ref 31.5–35.7)
MCV RBC AUTO: 97.7 FL (ref 79–97)
MONOCYTES # BLD AUTO: 0.73 10*3/MM3 (ref 0.1–0.9)
MONOCYTES NFR BLD AUTO: 8.6 % (ref 5–12)
NEUTROPHILS NFR BLD AUTO: 5.7 10*3/MM3 (ref 1.7–7)
NEUTROPHILS NFR BLD AUTO: 67.2 % (ref 42.7–76)
NRBC BLD AUTO-RTO: 0.1 /100 WBC (ref 0–0.2)
PLATELET # BLD AUTO: 98 10*3/MM3 (ref 140–450)
PMV BLD AUTO: 8.9 FL (ref 6–12)
POTASSIUM SERPL-SCNC: 4.1 MMOL/L (ref 3.5–5.2)
PROT SERPL-MCNC: 5.2 G/DL (ref 6–8.5)
RBC # BLD AUTO: 2.99 10*6/MM3 (ref 4.14–5.8)
SODIUM SERPL-SCNC: 131 MMOL/L (ref 136–145)
WBC NRBC COR # BLD: 8.48 10*3/MM3 (ref 3.4–10.8)

## 2022-10-30 PROCEDURE — 85025 COMPLETE CBC W/AUTO DIFF WBC: CPT | Performed by: INTERNAL MEDICINE

## 2022-10-30 PROCEDURE — 99231 SBSQ HOSP IP/OBS SF/LOW 25: CPT | Performed by: INTERNAL MEDICINE

## 2022-10-30 PROCEDURE — 25010000002 FUROSEMIDE PER 20 MG: Performed by: NURSE PRACTITIONER

## 2022-10-30 PROCEDURE — 82962 GLUCOSE BLOOD TEST: CPT

## 2022-10-30 PROCEDURE — 80053 COMPREHEN METABOLIC PANEL: CPT | Performed by: INTERNAL MEDICINE

## 2022-10-30 PROCEDURE — 63710000001 INSULIN LISPRO (HUMAN) PER 5 UNITS: Performed by: NURSE PRACTITIONER

## 2022-10-30 PROCEDURE — 99232 SBSQ HOSP IP/OBS MODERATE 35: CPT | Performed by: INTERNAL MEDICINE

## 2022-10-30 RX ORDER — POTASSIUM CHLORIDE 750 MG/1
20 TABLET, FILM COATED, EXTENDED RELEASE ORAL ONCE
Status: COMPLETED | OUTPATIENT
Start: 2022-10-30 | End: 2022-10-30

## 2022-10-30 RX ORDER — FUROSEMIDE 10 MG/ML
60 INJECTION INTRAMUSCULAR; INTRAVENOUS ONCE
Status: COMPLETED | OUTPATIENT
Start: 2022-10-30 | End: 2022-10-30

## 2022-10-30 RX ADMIN — INSULIN LISPRO 2 UNITS: 100 INJECTION, SOLUTION INTRAVENOUS; SUBCUTANEOUS at 17:30

## 2022-10-30 RX ADMIN — POTASSIUM CHLORIDE 20 MEQ: 750 TABLET, EXTENDED RELEASE ORAL at 10:12

## 2022-10-30 RX ADMIN — Medication 500 MCG: at 10:12

## 2022-10-30 RX ADMIN — SPIRONOLACTONE 100 MG: 100 TABLET ORAL at 10:12

## 2022-10-30 RX ADMIN — FUROSEMIDE 60 MG: 10 INJECTION, SOLUTION INTRAMUSCULAR; INTRAVENOUS at 10:12

## 2022-10-30 RX ADMIN — DOCUSATE SODIUM 100 MG: 100 CAPSULE, LIQUID FILLED ORAL at 10:12

## 2022-10-30 RX ADMIN — DOCUSATE SODIUM 50MG AND SENNOSIDES 8.6MG 2 TABLET: 8.6; 5 TABLET, FILM COATED ORAL at 10:14

## 2022-10-30 RX ADMIN — Medication 5 MG: at 20:46

## 2022-10-30 RX ADMIN — LACTULOSE 20 G: 20 SOLUTION ORAL at 20:46

## 2022-10-30 RX ADMIN — LACTULOSE 20 G: 20 SOLUTION ORAL at 10:11

## 2022-10-30 RX ADMIN — INSULIN LISPRO 2 UNITS: 100 INJECTION, SOLUTION INTRAVENOUS; SUBCUTANEOUS at 12:22

## 2022-10-30 RX ADMIN — FINASTERIDE 5 MG: 5 TABLET, FILM COATED ORAL at 10:11

## 2022-10-30 RX ADMIN — Medication 10 ML: at 10:13

## 2022-10-30 RX ADMIN — Medication 10 ML: at 20:46

## 2022-10-30 RX ADMIN — LACTULOSE 20 G: 20 SOLUTION ORAL at 17:30

## 2022-10-30 RX ADMIN — DOCUSATE SODIUM 100 MG: 100 CAPSULE, LIQUID FILLED ORAL at 20:46

## 2022-10-30 RX ADMIN — BISACODYL 10 MG: 10 SUPPOSITORY RECTAL at 10:12

## 2022-10-30 RX ADMIN — CARIPRAZINE 4.5 MG: 4.5 CAPSULE, GELATIN COATED ORAL at 17:30

## 2022-10-31 ENCOUNTER — TELEPHONE (OUTPATIENT)
Dept: ONCOLOGY | Facility: CLINIC | Age: 82
End: 2022-10-31

## 2022-10-31 DIAGNOSIS — R16.0 LIVER MASS: Primary | ICD-10-CM

## 2022-10-31 LAB
ALBUMIN SERPL-MCNC: 2.7 G/DL (ref 3.5–5.2)
ALBUMIN/GLOB SERPL: 1 G/DL
ALP SERPL-CCNC: 110 U/L (ref 39–117)
ALT SERPL W P-5'-P-CCNC: 23 U/L (ref 1–41)
ANION GAP SERPL CALCULATED.3IONS-SCNC: 8.9 MMOL/L (ref 5–15)
AST SERPL-CCNC: 45 U/L (ref 1–40)
BASOPHILS # BLD AUTO: 0.04 10*3/MM3 (ref 0–0.2)
BASOPHILS NFR BLD AUTO: 0.6 % (ref 0–1.5)
BILIRUB SERPL-MCNC: 3.1 MG/DL (ref 0–1.2)
BUN SERPL-MCNC: 8 MG/DL (ref 8–23)
BUN/CREAT SERPL: 10 (ref 7–25)
CALCIUM SPEC-SCNC: 8.5 MG/DL (ref 8.6–10.5)
CHLORIDE SERPL-SCNC: 99 MMOL/L (ref 98–107)
CO2 SERPL-SCNC: 24.1 MMOL/L (ref 22–29)
CREAT SERPL-MCNC: 0.8 MG/DL (ref 0.76–1.27)
DEPRECATED RDW RBC AUTO: 51.3 FL (ref 37–54)
EGFRCR SERPLBLD CKD-EPI 2021: 88.9 ML/MIN/1.73
EOSINOPHIL # BLD AUTO: 0.39 10*3/MM3 (ref 0–0.4)
EOSINOPHIL NFR BLD AUTO: 5.4 % (ref 0.3–6.2)
ERYTHROCYTE [DISTWIDTH] IN BLOOD BY AUTOMATED COUNT: 13.9 % (ref 12.3–15.4)
GLOBULIN UR ELPH-MCNC: 2.7 GM/DL
GLUCOSE BLDC GLUCOMTR-MCNC: 136 MG/DL (ref 70–130)
GLUCOSE BLDC GLUCOMTR-MCNC: 179 MG/DL (ref 70–130)
GLUCOSE BLDC GLUCOMTR-MCNC: 233 MG/DL (ref 70–130)
GLUCOSE BLDC GLUCOMTR-MCNC: 278 MG/DL (ref 70–130)
GLUCOSE SERPL-MCNC: 139 MG/DL (ref 65–99)
HCT VFR BLD AUTO: 31.8 % (ref 37.5–51)
HGB BLD-MCNC: 11 G/DL (ref 13–17.7)
IMM GRANULOCYTES # BLD AUTO: 0.07 10*3/MM3 (ref 0–0.05)
IMM GRANULOCYTES NFR BLD AUTO: 1 % (ref 0–0.5)
LYMPHOCYTES # BLD AUTO: 1.9 10*3/MM3 (ref 0.7–3.1)
LYMPHOCYTES NFR BLD AUTO: 26.2 % (ref 19.6–45.3)
MCH RBC QN AUTO: 35.3 PG (ref 26.6–33)
MCHC RBC AUTO-ENTMCNC: 34.6 G/DL (ref 31.5–35.7)
MCV RBC AUTO: 101.9 FL (ref 79–97)
MONOCYTES # BLD AUTO: 0.67 10*3/MM3 (ref 0.1–0.9)
MONOCYTES NFR BLD AUTO: 9.3 % (ref 5–12)
NEUTROPHILS NFR BLD AUTO: 4.17 10*3/MM3 (ref 1.7–7)
NEUTROPHILS NFR BLD AUTO: 57.5 % (ref 42.7–76)
NRBC BLD AUTO-RTO: 0 /100 WBC (ref 0–0.2)
PLATELET # BLD AUTO: 108 10*3/MM3 (ref 140–450)
PMV BLD AUTO: 8.8 FL (ref 6–12)
POTASSIUM SERPL-SCNC: 3.7 MMOL/L (ref 3.5–5.2)
PROT SERPL-MCNC: 5.4 G/DL (ref 6–8.5)
RBC # BLD AUTO: 3.12 10*6/MM3 (ref 4.14–5.8)
SODIUM SERPL-SCNC: 132 MMOL/L (ref 136–145)
WBC NRBC COR # BLD: 7.24 10*3/MM3 (ref 3.4–10.8)

## 2022-10-31 PROCEDURE — 63710000001 INSULIN LISPRO (HUMAN) PER 5 UNITS: Performed by: NURSE PRACTITIONER

## 2022-10-31 PROCEDURE — 99232 SBSQ HOSP IP/OBS MODERATE 35: CPT | Performed by: PHYSICIAN ASSISTANT

## 2022-10-31 PROCEDURE — 80053 COMPREHEN METABOLIC PANEL: CPT | Performed by: INTERNAL MEDICINE

## 2022-10-31 PROCEDURE — 82962 GLUCOSE BLOOD TEST: CPT

## 2022-10-31 PROCEDURE — 85025 COMPLETE CBC W/AUTO DIFF WBC: CPT | Performed by: INTERNAL MEDICINE

## 2022-10-31 RX ADMIN — LACTULOSE 20 G: 20 SOLUTION ORAL at 17:12

## 2022-10-31 RX ADMIN — LACTULOSE 20 G: 20 SOLUTION ORAL at 09:02

## 2022-10-31 RX ADMIN — OXYCODONE HYDROCHLORIDE AND ACETAMINOPHEN 1 TABLET: 7.5; 325 TABLET ORAL at 14:29

## 2022-10-31 RX ADMIN — Medication 10 ML: at 09:02

## 2022-10-31 RX ADMIN — LACTULOSE 20 G: 20 SOLUTION ORAL at 21:18

## 2022-10-31 RX ADMIN — DOCUSATE SODIUM 100 MG: 100 CAPSULE, LIQUID FILLED ORAL at 21:17

## 2022-10-31 RX ADMIN — Medication 500 MCG: at 09:01

## 2022-10-31 RX ADMIN — FINASTERIDE 5 MG: 5 TABLET, FILM COATED ORAL at 09:02

## 2022-10-31 RX ADMIN — SPIRONOLACTONE 100 MG: 100 TABLET ORAL at 09:01

## 2022-10-31 RX ADMIN — FUROSEMIDE 60 MG: 40 TABLET ORAL at 09:01

## 2022-10-31 RX ADMIN — DOCUSATE SODIUM 100 MG: 100 CAPSULE, LIQUID FILLED ORAL at 09:02

## 2022-10-31 RX ADMIN — Medication 10 ML: at 21:18

## 2022-10-31 RX ADMIN — BISACODYL 10 MG: 10 SUPPOSITORY RECTAL at 09:02

## 2022-10-31 RX ADMIN — INSULIN LISPRO 3 UNITS: 100 INJECTION, SOLUTION INTRAVENOUS; SUBCUTANEOUS at 17:13

## 2022-10-31 RX ADMIN — DOCUSATE SODIUM 50MG AND SENNOSIDES 8.6MG 2 TABLET: 8.6; 5 TABLET, FILM COATED ORAL at 09:01

## 2022-10-31 RX ADMIN — Medication 5 MG: at 21:17

## 2022-10-31 NOTE — TELEPHONE ENCOUNTER
Patient's daughter Chandni returned call. Patient is still Inpatient at [facility] and informed patient's daughter that Dr Lancaster is off today due to rounding at the hospital all weekend and either Dr Beard or Dr Mauricio should be following up with patient today 10/31/2022. Explained to patient's daughter that both Dr Lancaster and Dr Carpenter mention in their dictations that patient would possibly have Liver biopsy today 10/31/2022.   Instructed patient's daughter to speak with the RN taking care of patient at the hospital about patient verbalizing an increase of shortness of air and to inquire if patient is schedule for liver biopsy today 10/31/2022.  Patient's daughter v/u

## 2022-11-01 ENCOUNTER — READMISSION MANAGEMENT (OUTPATIENT)
Dept: CALL CENTER | Facility: HOSPITAL | Age: 82
End: 2022-11-01

## 2022-11-01 ENCOUNTER — APPOINTMENT (OUTPATIENT)
Dept: CT IMAGING | Facility: HOSPITAL | Age: 82
End: 2022-11-01

## 2022-11-01 VITALS
HEART RATE: 84 BPM | HEIGHT: 69 IN | OXYGEN SATURATION: 98 % | RESPIRATION RATE: 16 BRPM | TEMPERATURE: 97.5 F | SYSTOLIC BLOOD PRESSURE: 120 MMHG | WEIGHT: 184.08 LBS | BODY MASS INDEX: 27.27 KG/M2 | DIASTOLIC BLOOD PRESSURE: 77 MMHG

## 2022-11-01 LAB
ALBUMIN SERPL-MCNC: 2.9 G/DL (ref 3.5–5.2)
ALBUMIN/GLOB SERPL: 1.1 G/DL
ALP SERPL-CCNC: 118 U/L (ref 39–117)
ALT SERPL W P-5'-P-CCNC: 22 U/L (ref 1–41)
ANION GAP SERPL CALCULATED.3IONS-SCNC: 10.2 MMOL/L (ref 5–15)
AST SERPL-CCNC: 53 U/L (ref 1–40)
BASOPHILS # BLD AUTO: 0.05 10*3/MM3 (ref 0–0.2)
BASOPHILS NFR BLD AUTO: 0.8 % (ref 0–1.5)
BILIRUB SERPL-MCNC: 2.6 MG/DL (ref 0–1.2)
BUN SERPL-MCNC: 9 MG/DL (ref 8–23)
BUN/CREAT SERPL: 11.5 (ref 7–25)
CALCIUM SPEC-SCNC: 9 MG/DL (ref 8.6–10.5)
CHLORIDE SERPL-SCNC: 98 MMOL/L (ref 98–107)
CO2 SERPL-SCNC: 24.8 MMOL/L (ref 22–29)
CREAT SERPL-MCNC: 0.78 MG/DL (ref 0.76–1.27)
DEPRECATED RDW RBC AUTO: 50.7 FL (ref 37–54)
EGFRCR SERPLBLD CKD-EPI 2021: 89.6 ML/MIN/1.73
EOSINOPHIL # BLD AUTO: 0.48 10*3/MM3 (ref 0–0.4)
EOSINOPHIL NFR BLD AUTO: 7.5 % (ref 0.3–6.2)
ERYTHROCYTE [DISTWIDTH] IN BLOOD BY AUTOMATED COUNT: 13.7 % (ref 12.3–15.4)
GLOBULIN UR ELPH-MCNC: 2.6 GM/DL
GLUCOSE BLDC GLUCOMTR-MCNC: 135 MG/DL (ref 70–130)
GLUCOSE BLDC GLUCOMTR-MCNC: 183 MG/DL (ref 70–130)
GLUCOSE SERPL-MCNC: 225 MG/DL (ref 65–99)
HCT VFR BLD AUTO: 32.6 % (ref 37.5–51)
HGB BLD-MCNC: 11.5 G/DL (ref 13–17.7)
IMM GRANULOCYTES # BLD AUTO: 0.07 10*3/MM3 (ref 0–0.05)
IMM GRANULOCYTES NFR BLD AUTO: 1.1 % (ref 0–0.5)
LYMPHOCYTES # BLD AUTO: 1.57 10*3/MM3 (ref 0.7–3.1)
LYMPHOCYTES NFR BLD AUTO: 24.6 % (ref 19.6–45.3)
MCH RBC QN AUTO: 35.6 PG (ref 26.6–33)
MCHC RBC AUTO-ENTMCNC: 35.3 G/DL (ref 31.5–35.7)
MCV RBC AUTO: 100.9 FL (ref 79–97)
MONOCYTES # BLD AUTO: 0.67 10*3/MM3 (ref 0.1–0.9)
MONOCYTES NFR BLD AUTO: 10.5 % (ref 5–12)
NEUTROPHILS NFR BLD AUTO: 3.54 10*3/MM3 (ref 1.7–7)
NEUTROPHILS NFR BLD AUTO: 55.5 % (ref 42.7–76)
NRBC BLD AUTO-RTO: 0.2 /100 WBC (ref 0–0.2)
PLATELET # BLD AUTO: 121 10*3/MM3 (ref 140–450)
PMV BLD AUTO: 9 FL (ref 6–12)
POTASSIUM SERPL-SCNC: 3.8 MMOL/L (ref 3.5–5.2)
PROT SERPL-MCNC: 5.5 G/DL (ref 6–8.5)
RBC # BLD AUTO: 3.23 10*6/MM3 (ref 4.14–5.8)
SARS-COV-2 RNA RESP QL NAA+PROBE: NOT DETECTED
SODIUM SERPL-SCNC: 133 MMOL/L (ref 136–145)
WBC NRBC COR # BLD: 6.38 10*3/MM3 (ref 3.4–10.8)

## 2022-11-01 PROCEDURE — U0003 INFECTIOUS AGENT DETECTION BY NUCLEIC ACID (DNA OR RNA); SEVERE ACUTE RESPIRATORY SYNDROME CORONAVIRUS 2 (SARS-COV-2) (CORONAVIRUS DISEASE [COVID-19]), AMPLIFIED PROBE TECHNIQUE, MAKING USE OF HIGH THROUGHPUT TECHNOLOGIES AS DESCRIBED BY CMS-2020-01-R: HCPCS | Performed by: NURSE PRACTITIONER

## 2022-11-01 PROCEDURE — 77012 CT SCAN FOR NEEDLE BIOPSY: CPT

## 2022-11-01 PROCEDURE — 0 LIDOCAINE 1 % SOLUTION: Performed by: RADIOLOGY

## 2022-11-01 PROCEDURE — 99153 MOD SED SAME PHYS/QHP EA: CPT

## 2022-11-01 PROCEDURE — 25010000002 INFLUENZA VAC HIGH-DOSE QUAD 0.7 ML SUSPENSION PREFILLED SYRINGE: Performed by: HOSPITALIST

## 2022-11-01 PROCEDURE — 82962 GLUCOSE BLOOD TEST: CPT

## 2022-11-01 PROCEDURE — 88307 TISSUE EXAM BY PATHOLOGIST: CPT | Performed by: INTERNAL MEDICINE

## 2022-11-01 PROCEDURE — 88342 IMHCHEM/IMCYTCHM 1ST ANTB: CPT | Performed by: INTERNAL MEDICINE

## 2022-11-01 PROCEDURE — G0008 ADMIN INFLUENZA VIRUS VAC: HCPCS | Performed by: HOSPITALIST

## 2022-11-01 PROCEDURE — 25010000002 ONDANSETRON PER 1 MG: Performed by: NURSE PRACTITIONER

## 2022-11-01 PROCEDURE — 25010000002 FENTANYL CITRATE (PF) 50 MCG/ML SOLUTION: Performed by: RADIOLOGY

## 2022-11-01 PROCEDURE — 85025 COMPLETE CBC W/AUTO DIFF WBC: CPT | Performed by: INTERNAL MEDICINE

## 2022-11-01 PROCEDURE — 25010000002 MIDAZOLAM PER 1 MG: Performed by: RADIOLOGY

## 2022-11-01 PROCEDURE — 80053 COMPREHEN METABOLIC PANEL: CPT | Performed by: INTERNAL MEDICINE

## 2022-11-01 PROCEDURE — 99152 MOD SED SAME PHYS/QHP 5/>YRS: CPT

## 2022-11-01 PROCEDURE — 88341 IMHCHEM/IMCYTCHM EA ADD ANTB: CPT | Performed by: INTERNAL MEDICINE

## 2022-11-01 PROCEDURE — 90662 IIV NO PRSV INCREASED AG IM: CPT | Performed by: HOSPITALIST

## 2022-11-01 RX ORDER — LIDOCAINE HYDROCHLORIDE 10 MG/ML
20 INJECTION, SOLUTION INFILTRATION; PERINEURAL ONCE
Status: COMPLETED | OUTPATIENT
Start: 2022-11-01 | End: 2022-11-01

## 2022-11-01 RX ORDER — FENTANYL CITRATE 50 UG/ML
25 INJECTION, SOLUTION INTRAMUSCULAR; INTRAVENOUS ONCE
Status: COMPLETED | OUTPATIENT
Start: 2022-11-01 | End: 2022-11-01

## 2022-11-01 RX ORDER — SPIRONOLACTONE 100 MG/1
100 TABLET, FILM COATED ORAL DAILY
Qty: 30 TABLET | Refills: 0 | Status: SHIPPED | OUTPATIENT
Start: 2022-11-01

## 2022-11-01 RX ORDER — MIDAZOLAM HYDROCHLORIDE 1 MG/ML
INJECTION INTRAMUSCULAR; INTRAVENOUS AS NEEDED
Status: COMPLETED | OUTPATIENT
Start: 2022-11-01 | End: 2022-11-01

## 2022-11-01 RX ORDER — LACTULOSE 10 G/15ML
20 SOLUTION ORAL 3 TIMES DAILY
Qty: 30 ML | Refills: 0 | Status: SHIPPED | OUTPATIENT
Start: 2022-11-01

## 2022-11-01 RX ORDER — FUROSEMIDE 20 MG/1
60 TABLET ORAL DAILY
Qty: 90 TABLET | Refills: 0 | Status: SHIPPED | OUTPATIENT
Start: 2022-11-01 | End: 2022-12-01

## 2022-11-01 RX ORDER — FENTANYL CITRATE 50 UG/ML
INJECTION, SOLUTION INTRAMUSCULAR; INTRAVENOUS AS NEEDED
Status: COMPLETED | OUTPATIENT
Start: 2022-11-01 | End: 2022-11-01

## 2022-11-01 RX ORDER — POTASSIUM CHLORIDE 750 MG/1
10 TABLET, FILM COATED, EXTENDED RELEASE ORAL DAILY
Qty: 30 TABLET | Refills: 0 | Status: SHIPPED | OUTPATIENT
Start: 2022-11-01

## 2022-11-01 RX ORDER — SODIUM CHLORIDE 9 MG/ML
250 INJECTION, SOLUTION INTRAVENOUS ONCE
Status: DISCONTINUED | OUTPATIENT
Start: 2022-11-01 | End: 2022-11-01 | Stop reason: HOSPADM

## 2022-11-01 RX ADMIN — DOCUSATE SODIUM 50MG AND SENNOSIDES 8.6MG 2 TABLET: 8.6; 5 TABLET, FILM COATED ORAL at 13:00

## 2022-11-01 RX ADMIN — ACETAMINOPHEN 650 MG: 325 TABLET, FILM COATED ORAL at 12:22

## 2022-11-01 RX ADMIN — INFLUENZA A VIRUS A/VICTORIA/2570/2019 IVR-215 (H1N1) ANTIGEN (FORMALDEHYDE INACTIVATED), INFLUENZA A VIRUS A/DARWIN/9/2021 SAN-010 (H3N2) ANTIGEN (FORMALDEHYDE INACTIVATED), INFLUENZA B VIRUS B/PHUKET/3073/2013 ANTIGEN (FORMALDEHYDE INACTIVATED), AND INFLUENZA B VIRUS B/MICHIGAN/01/2021 ANTIGEN (FORMALDEHYDE INACTIVATED) 0.7 ML: 60; 60; 60; 60 INJECTION, SUSPENSION INTRAMUSCULAR at 14:45

## 2022-11-01 RX ADMIN — FENTANYL CITRATE 25 MCG: 50 INJECTION INTRAMUSCULAR; INTRAVENOUS at 12:49

## 2022-11-01 RX ADMIN — Medication 10 ML: at 13:01

## 2022-11-01 RX ADMIN — Medication 500 MCG: at 13:00

## 2022-11-01 RX ADMIN — ONDANSETRON HYDROCHLORIDE 4 MG: 2 SOLUTION INTRAMUSCULAR; INTRAVENOUS at 12:23

## 2022-11-01 RX ADMIN — DOCUSATE SODIUM 100 MG: 100 CAPSULE, LIQUID FILLED ORAL at 13:00

## 2022-11-01 RX ADMIN — FENTANYL CITRATE 25 MCG: 50 INJECTION INTRAMUSCULAR; INTRAVENOUS at 11:26

## 2022-11-01 RX ADMIN — MIDAZOLAM 1 MG: 1 INJECTION INTRAMUSCULAR; INTRAVENOUS at 11:26

## 2022-11-01 RX ADMIN — LIDOCAINE HYDROCHLORIDE 20 ML: 10 INJECTION, SOLUTION INFILTRATION; PERINEURAL at 11:29

## 2022-11-01 RX ADMIN — FINASTERIDE 5 MG: 5 TABLET, FILM COATED ORAL at 13:00

## 2022-11-01 RX ADMIN — LACTULOSE 20 G: 20 SOLUTION ORAL at 13:00

## 2022-11-01 NOTE — PLAN OF CARE
A/Ox4. No s/s distress. Instructed and kept on NPO after midnight. For CT liver biopsy on 11/1/22. Voiding per SPC and L nephrostomy tube. Last BM on 10/31/22. SCD's on BLE.

## 2022-11-01 NOTE — PLAN OF CARE
Problem: Adult Inpatient Plan of Care  Goal: Plan of Care Review  Outcome: Adequate for Care Transition  Flowsheets (Taken 11/1/2022 1101)  Progress: improving  Plan of Care Reviewed With:   patient   friend  Outcome Evaluation: Discharging home s/p CT guided liver bx this AM. Follow-ups in AVS for patient info. Belongings sent with patient and friend.  Goal: Patient-Specific Goal (Individualized)  Outcome: Adequate for Care Transition  Goal: Absence of Hospital-Acquired Illness or Injury  Outcome: Adequate for Care Transition  Intervention: Identify and Manage Fall Risk  Recent Flowsheet Documentation  Taken 11/1/2022 1000 by Libby Jeff RN  Safety Promotion/Fall Prevention: patient off unit  Taken 11/1/2022 0908 by Libby Jeff RN  Safety Promotion/Fall Prevention:   safety round/check completed   room organization consistent   nonskid shoes/slippers when out of bed   fall prevention program maintained   clutter free environment maintained   assistive device/personal items within reach   activity supervised  Taken 11/1/2022 0726 by Libby Jeff RN  Safety Promotion/Fall Prevention: safety round/check completed  Intervention: Prevent Skin Injury  Recent Flowsheet Documentation  Taken 11/1/2022 0908 by Libby Jeff RN  Body Position: position changed independently  Skin Protection: adhesive use limited  Intervention: Prevent and Manage VTE (Venous Thromboembolism) Risk  Recent Flowsheet Documentation  Taken 11/1/2022 0908 by Libby Jeff RN  Activity Management: ambulated in room  VTE Prevention/Management:   bilateral   sequential compression devices on  Intervention: Prevent Infection  Recent Flowsheet Documentation  Taken 11/1/2022 0908 by Libby Jeff RN  Infection Prevention:   single patient room provided   rest/sleep promoted   hand hygiene promoted  Goal: Optimal Comfort and Wellbeing  Outcome: Adequate for Care Transition  Intervention: Provide Person-Centered Care  Recent  Flowsheet Documentation  Taken 11/1/2022 0908 by Libby Jeff RN  Trust Relationship/Rapport:   care explained   choices provided   emotional support provided   empathic listening provided   questions answered   reassurance provided   thoughts/feelings acknowledged  Goal: Readiness for Transition of Care  Outcome: Adequate for Care Transition     Problem: Fall Injury Risk  Goal: Absence of Fall and Fall-Related Injury  Outcome: Adequate for Care Transition  Intervention: Promote Injury-Free Environment  Recent Flowsheet Documentation  Taken 11/1/2022 1000 by Libby Jeff RN  Safety Promotion/Fall Prevention: patient off unit  Taken 11/1/2022 0908 by Libby Jeff RN  Safety Promotion/Fall Prevention:   safety round/check completed   room organization consistent   nonskid shoes/slippers when out of bed   fall prevention program maintained   clutter free environment maintained   assistive device/personal items within reach   activity supervised  Taken 11/1/2022 0726 by Libby Jeff RN  Safety Promotion/Fall Prevention: safety round/check completed     Problem: Oral Intake Inadequate  Goal: Improved Oral Intake  Outcome: Adequate for Care Transition     Problem: Skin Injury Risk Increased  Goal: Skin Health and Integrity  Outcome: Adequate for Care Transition  Intervention: Optimize Skin Protection  Recent Flowsheet Documentation  Taken 11/1/2022 0908 by Libby Jeff RN  Pressure Reduction Techniques:   frequent weight shift encouraged   weight shift assistance provided  Head of Bed (HOB) Positioning: HOB at 30 degrees  Pressure Reduction Devices: pressure-redistributing mattress utilized  Skin Protection: adhesive use limited   Goal Outcome Evaluation:  Plan of Care Reviewed With: patient, friend        Progress: improving  Outcome Evaluation: Discharging home s/p CT guided liver bx this AM. Follow-ups in AVS for patient info. Belongings sent with patient and friend.

## 2022-11-01 NOTE — POST-PROCEDURE NOTE
POST PROCEDURE NOTE    Procedure: liver biopsy    Pre-Procedure Diagnosis: lesions    Post-procedure Diagnosis: same    Findings: successf.bx, gelfoam used    Complications: none    Blood loss: min    Specimen Removed: 5x18G cores as requested    Disposition:   Transfer back to inpatient room

## 2022-11-01 NOTE — DISCHARGE SUMMARY
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]  Yes   • Ascites [R18.8]  Yes   • Metabolic encephalopathy [G93.41]  Yes   • UTI (urinary tract infection) due to urinary indwelling catheter (HCC) [T83.511A, N39.0]  Yes   • Portal hypertension (HCC) [K76.6]  Yes   • BPH with obstruction/lower urinary tract symptoms [N40.1, N13.8]  Yes   • Hyponatremia [E87.1]  Yes   • Anemia, chronic disease [D63.8]  Yes   • History of MI (myocardial infarction) [I25.2]  Not Applicable   • Cirrhosis (HCC) [K74.60]  Yes   • Essential hypertension [I10]  Yes   • Type 2 diabetes mellitus without complication (HCC) [E11.9]  Yes   • B12 deficiency [E53.8]  Yes      Resolved Hospital Problems   No resolved problems to display.        Hospital Course     Mr. Castle is a 82 y.o. male with a history of hypertension, type 2 diabetes mellitus, cirrhosis, BPH who presented to [facility] initially complaining of difficulty urinating.  Please see the admitting history and physical for further details.  He was found to have BPH with obstruction/lower urinary tract symptoms most likely due to complications of liver cirrhosis/ascites and was admitted to the hospital for further evaluation and treatment.      Patient underwent paracentesis for ascites--1.4 L removal (negative for SBP)--with improvement of urinary retention and urology recommend continue suprapubic catheter and left nephrostomy tube with follow-up outpatient appointment in 1 to 2 weeks.      Gastroenterology evaluated patient and recommend continue lactulose and diuretics for liver cirrhosis.  CT-guided  liver biopsy obtained prior to discharge on 11/1/2022 (required extended hospitalization in order to complete CT-guided liver biopsy to rule out hepatocellular carcinoma per oncology recommendation) given elevated  recommend follow-up with.  Oncology--Dr. Lancaster--recommend follow-up in 2 to 3 weeks for reevaluation and to discuss pathology results.    Patient & family frustrated during hospital admission regarding delayed CT-guided biopsy; however, appears medically stable for discharge home on 11/1/2022 following normalization of blood pressure with normal saline 250 mL bolus postprocedure.  Recommend hold diuretic on 11/1/2022 and resume furosemide 60 mg p.o. daily on 11/2/2022.  Patient agrees with plan for discharge and follow-up as previously discussed.  Tolerating diet and physical activity as well.    COVID screen for [facility] placement pending results.    Day of Discharge     Physical Exam:  Temp:  [97.5 °F (36.4 °C)-98.3 °F (36.8 °C)] 97.5 °F (36.4 °C)  Heart Rate:  [54-89] 84  Resp:  [10-24] 16  BP: ()/() 120/77  Body mass index is 27.18 kg/m².     Physical Exam  Constitutional:       General: He is not in acute distress.     Appearance: He is obese. He is not toxic-appearing.   HENT:      Head: Normocephalic and atraumatic.   Eyes:      Extraocular Movements: Extraocular movements intact.      Conjunctiva/sclera: Conjunctivae normal.   Cardiovascular:      Rate and Rhythm: Normal rate and regular rhythm.   Pulmonary:      Effort: Pulmonary effort is normal.      Breath sounds: Normal breath sounds.   Abdominal:      General: Bowel sounds are normal.      Palpations: Abdomen is soft.      Tenderness: There is no abdominal tenderness.   Musculoskeletal:      Cervical back: Normal range of motion and neck supple.      Right lower leg: Edema (trace pitting edema, BLE) present.      Left lower leg: Edema present.   Skin:     General: Skin is warm and dry.   Neurological:      Mental Status: He is  alert and oriented to person, place, and time.   Psychiatric:         Behavior: Behavior normal.         Thought Content: Thought content normal.         Consultants     Consult Orders (all) (From admission, onward)     Start     Ordered    10/28/22 1421  Hematology & Oncology Inpatient Consult  Once        Specialty:  Hematology and Oncology  Provider:  Tony Bolanos II, MD    10/28/22 1420    10/23/22 1935  Inpatient Case Management  Consult  Once        Provider:  (Not yet assigned)    10/23/22 1934    10/21/22 1351  Inpatient Urology Consult  Once        Specialty:  Urology  Provider:  Art Dickerson MD    10/21/22 1351    10/21/22 0357  Inpatient Gastroenterology Consult  Once        Specialty:  Gastroenterology  Provider:  Yahir Monsalve MD    10/21/22 0357    10/21/22 0206  [facility] (on-call MD unless specified) Details  Once        Specialty:  Hospitalist  Provider:  Major Adams MD    10/21/22 0205              Procedures     * Surgery not found *      Imaging Results (All)     Procedure Component Value Units Date/Time    MRI Abdomen With & Without Contrast [[id number]] Collected: 10/27/22 1135     Updated: 10/28/22 1748    Narrative:      MRI ABDOMEN WITH AND WITHOUT CONTRAST     HISTORY: Elevated AFP, cirrhosis.     TECHNIQUE: Multiplanar multisequence MRI of the abdomen was performed  before and after the IV administration of 20 mL of MultiHance.     COMPARISON: CT abdomen and pelvis 10/20/2022.     FINDINGS: Evaluation is significantly suboptimal due to respiratory  motion.     There is cholelithiasis. Trace left pleural effusion is present.     The liver is cirrhotic. The spleen is enlarged, measuring 14.8 cm in  length. There are large perigastric, paraesophageal and perisplenic  varices. Small amount of ascites is present.     There is a 2.3 x 1.9 cm hyperenhancing lesion within the right hepatic  dome. Unfortunately due to the significant motion artifact, no  definite  washout cannot be confirmed.     There also appears to be an ill-defined 3.1 x 2.6 cm hyperenhancing  lesion within the right hepatic lobe. The enhancement is largely  peripheral with questionable more central enhancement. There does appear  to be a degree of washout in this area.     IMPRESSION  1.  This examination is significantly suboptimal due to respiratory  motion. There are 2 lesions within the right hepatic lobe which are  suspicious for multifocal hepatocellular carcinoma; however, they cannot  be confirmed to be LI-RADS 5 lesions due to the above stated  limitations. Also, additional suspicious lesions cannot be excluded.  Therefore, recommend repeat abdominal MRI following management of the  patient's more acute comorbidities in order to better evaluate these  lesions and exclude the possibility of additional suspicious lesions. I  discussed this with Ms. Elena by telephone on 10/27/2022.  2.  Cirrhosis with findings of portosystemic shunting including small  amount of ascites, mild splenomegaly and large paraesophageal,  perisplenic and perigastric varices.  3.  Other findings as above.      This report was finalized on 10/28/2022 5:45 PM by Dr. Amish Das M.D.       MRI Abdomen With & Without Contrast [[id number]] Collected: 10/27/22 2123     Updated: 10/27/22 2123    Narrative:        Patient: LUCIANO DE LEON  Time Out: 21:22  Exam(s): MRI ABDOMEN W WO Contrast     EXAM:    MR Abdomen Without and With Intravenous Contrast    CLINICAL HISTORY:     Reason for exam: Liver cirrhosis elevated alpha-fetoprotein suspicious   liver lesions for hepatocellular carcinoma.    TECHNIQUE:    Multiplanar magnetic resonance images of the abdomen without and with   intravenous contrast.    COMPARISON:    No relevant prior studies available.    FINDINGS:    Lung bases:   No mass.  No consolidation.    Liver:  Enhancing mass in the hepatic dome measures 2.8 x 2.2 cm.  Ill-  defined mass in the  inferior right lobe measures 3.9 x 3.4 cm.  Hepatic   cirrhosis and small ascites.    Gallbladder and bile ducts:  Unremarkable.  No calcified stones.  No   ductal dilation.    Pancreas:  Unremarkable.  No ductal dilation.  No mass.    Spleen:  Splenomegaly.  Numerous upper abdominal venous collaterals.    Adrenals:  Unremarkable.  No mass.    Kidneys and ureters:   No hydronephrosis.  No solid mass.      IMPRESSION:       1.  Hepatic cirrhosis and findings of portal hypertension.  2.  Enhancing masses in the right inferior liver and right hepatic dome.    Findings most likely represent primary hepatocellular carcinoma.    Recommend alpha-fetoprotein and histopathologic correlation.      Impression:          Electronically signed by Td Sanchez MD on 10-27-22 at 2122     Paracentesis [[id number]] Collected: 10/21/22 1653    Specimen: Body Fluid Updated: 10/21/22 1656    Narrative:      ULTRASOUND-GUIDED PARACENTESIS     HISTORY: Ascites     After signed informed consent was obtained, the abdomen was prepped and  draped in the usual sterile fashion with 2% chlorhexidine. Lidocaine was  used for local anesthesia.     A 5 [place] catheter was inserted into the right lower quadrant using  ultrasound guidance. 1400 mL of yellow-colored ascites was removed.  Sample was sent to the lab.     Confirmatory images were obtained.     Patient tolerated the procedure well with no complications.       Impression:      Ultrasound-guided paracentesis as described.     This report was finalized on 10/21/2022 4:53 PM by Dr. Jey Dickerson M.D.       CT Abdomen Pelvis With Contrast [[id number]] Collected: 10/21/22 0045     Updated: 10/21/22 0058    Narrative:      CT OF THE ABDOMEN AND PELVIS WITH CONTRAST     HISTORY: Abdominal pain     COMPARISON: 10/04/2022     TECHNIQUE: Axial CT imaging was obtained through the abdomen and pelvis.  IV contrast was administered.     FINDINGS:  Images through the lung bases demonstrate  bibasilar atelectasis. There  may be trace bilateral pleural effusions, left greater than right. The  liver is cirrhotic in morphology, and there is evidence of portal  hypertension, including splenomegaly and gastroesophageal varices.  Similar findings were present on the prior study. Patient's ascites has  increased when compared to prior study. Pancreas is atrophic.  Cholelithiasis is suspected. Adrenal glands are within normal limits.  The patient has a left-sided nephrostomy tube. This appears to be  appropriately positioned. No hydronephrosis is seen on either side. The  kidneys enhance symmetrically. The patient has a suprapubic catheter,  with decompression of the urinary bladder. The patient has a collection  along the left pelvic sidewall, which measures 7.7 x 5.6 cm. This was  present on prior imaging from October 4, when it measured 8.1 x 3.8 cm.  That time, it was shown to be a urinoma. Fluid and stranding anterior  within the left iliac fossa is improved when compared to the prior  study. There is also decreased fluid noted anterior to the bladder area  There are small bilateral fat-containing inguinal hernias. There is no  bowel obstruction. Somewhat thick-walled appearance to the colon,  particularly the cecum, may reflect portal colopathy. Postsurgical  changes are seen within the intra-abdominal wall. No acute osseous  abnormalities are seen.       Impression:         1. Left-sided nephrostomy tube appears to be appropriately positioned.  No hydronephrosis is seen on either side.  2. Suprapubic catheter appears to be appropriately positioned. There is  some perivesical stranding, and correlation with urinalysis and urine  cultures is recommended.  3. Left pelvic urinoma is again noted. Stranding within the left iliac  fossa appears improved when compared to prior study.  4. Cirrhosis with evidence of portal hypertension. Volume of ascites has  increased when compared to prior exam.     Radiation  dose reduction techniques were utilized, including automated  exposure control and exposure modulation based on body size.     This report was finalized on 10/21/2022 12:55 AM by Dr. Theresa Jack M.D.           Results for orders placed during the hospital encounter of 10/20/22    Duplex Portal Hepatic Complete CAR    Interpretation Summary  •  Technically difficult examination due to body habitus, positioning and ascites. The superior mesenteric and right portal veins were not visualized.  •  Echogenicity of the liver consistent with cirrhosis. Borderline dilation of the extrahepatic main portal vein.  •  All other vessels appear normal with normal flow direction and no evidence of thrombus.    Results for orders placed during the hospital encounter of 09/28/21    Adult Transthoracic Echo Complete W/ Cont if Necessary Per Protocol    Interpretation Summary  · Left ventricular ejection fraction appears to be 61 - 65%.  · Left ventricular diastolic function is consistent with (grade I) impaired relaxation.  · No evidence of significant valvular dysfunction    Pertinent Labs     Results from last 7 days   Lab Units 11/01/22  0434 10/31/22  0429 10/30/22  0551 10/29/22  0524   WBC 10*3/mm3 6.38 7.24 8.48 4.25   HEMOGLOBIN g/dL 11.5* 11.0* 10.4* 10.1*   PLATELETS 10*3/mm3 121* 108* 98* 100*     Results from last 7 days   Lab Units 11/01/22  0434 10/31/22  0429 10/30/22  0551 10/29/22  0524   SODIUM mmol/L 133* 132* 131* 135*   POTASSIUM mmol/L 3.8 3.7 4.1 3.9   CHLORIDE mmol/L 98 99 99 102   CO2 mmol/L 24.8 24.1 23.7 24.4   BUN mg/dL 9 8 5* 5*   CREATININE mg/dL 0.78 0.80 0.74* 0.65*   GLUCOSE mg/dL 225* 139* 131* 239*   EGFR mL/min/1.73 89.6 88.9 91.0 94.7     Results from last 7 days   Lab Units 11/01/22  0434 10/31/22  0429 10/30/22  0551 10/29/22  0524   ALBUMIN g/dL 2.90* 2.70* 2.60* 2.70*   BILIRUBIN mg/dL 2.6* 3.1* 3.1* 2.1*   ALK PHOS U/L 118* 110 107 102   AST (SGOT) U/L 53* 45* 57* 56*   ALT (SGPT) U/L  22 23 26 21     Results from last 7 days   Lab Units 11/01/22  0434 10/31/22  0429 10/30/22  0551 10/29/22  0524 10/28/22  1700   CALCIUM mg/dL 9.0 8.5* 8.2* 8.2*  --    ALBUMIN g/dL 2.90* 2.70* 2.60* 2.70*  --    MAGNESIUM mg/dL  --   --   --   --  2.0               Invalid input(s): LDLCALC          Test Results Pending at Discharge     Pending Labs     Order Current Status    COVID PRE-OP / PRE-PROCEDURE SCREENING ORDER (NO ISOLATION) - Swab, Nasopharynx In process    COVID-19, [facility]AN IN-HOUSE CEPHEID/KENNY NP SWAB IN TRANSPORT MEDIA 8-12 HR TAT - Swab, Nasopharynx In process    Tissue Pathology Exam In process          Discharge Details        Discharge Medications      New Medications      Instructions Start Date   furosemide 20 MG tablet  Commonly known as: LASIX   60 mg, Oral, Daily      lactulose 10 GM/15ML solution  Commonly known as: CHRONULAC   20 g, Oral, 3 Times Daily      potassium chloride 10 MEQ CR tablet   10 mEq, Oral, Daily      spironolactone 100 MG tablet  Commonly known as: ALDACTONE   100 mg, Oral, Daily         Continue These Medications      Instructions Start Date   Cariprazine HCl 1.5 MG capsule capsule  Commonly known as: VRAYLAR   4.5 mg, Oral, Every Evening      finasteride 5 MG tablet  Commonly known as: PROSCAR   5 mg, Oral, Daily      melatonin 5 MG tablet tablet   5 mg, Oral, Nightly      meloxicam 7.5 MG tablet  Commonly known as: MOBIC   7.5 mg, Oral, Daily, TO HOLD 1 WEEK BEFORE SURGERY      metFORMIN 500 MG tablet  Commonly known as: GLUCOPHAGE   500 mg, Oral, 2 Times Daily With Meals      oxyCODONE-acetaminophen 7.5-325 MG per tablet  Commonly known as: PERCOCET   1 tablet, Oral, Every 4 Hours PRN      pioglitazone 30 MG tablet  Commonly known as: ACTOS   30 mg, Oral, Daily      sennosides-docusate 8.6-50 MG per tablet  Commonly known as: PERICOLACE   2 tablets, Oral, Daily      TRESIBA FLEXTOUCH SC   12-15 Units, Subcutaneous, As Needed, PER SLIDING SCALE      vitamin B-12 500  MCG tablet  Commonly known as: CYANOCOBALAMIN   500 mcg, Oral, Daily         Stop These Medications    lisinopril 5 MG tablet  Commonly known as: PRINIVIL,ZESTRIL            Allergies   Allergen Reactions   • Morphine Itching and Confusion   • Penicillins Unknown - Low Severity     REACTION UNKNOWN; patient reports no allergy to pcn.         Discharge Disposition:  Home or Self Care      Discharge Diet:  Diet Order   Procedures   • Diet Soft Texture; Whole Foods; Thin; Low Sodium, Consistent Carbohydrate, Daily Fluid Restriction; 1500 mL Fluid Per Day; 240 mL Fluid Per Tray; 2,000 mg Na       Discharge Activity:   Activity Instructions     Activity as Tolerated            CODE STATUS:    Code Status and Medical Interventions:   Ordered at: 10/21/22 0357     Code Status (Patient has no pulse and is not breathing):    CPR (Attempt to Resuscitate)     Medical Interventions (Patient has pulse or is breathing):    Full Support       Future Appointments   Date Time Provider Department Center   11/21/2022  9:50 AM LAB CHAIR 5 MELINDA ANGULO  [facility]   11/21/2022 10:20 AM Cony Lancaster MD PhD [facility]     Additional Instructions for the Follow-ups that You Need to Schedule     Discharge Follow-up with PCP   As directed       Currently Documented PCP:    Cam Dickey MD    PCP Phone Number:    [phone]     Follow Up Details: Please call to schedule a one week or earliest available follow-up appointment with PCP; BMP & BP monitoring            Follow-up Information     Cam Dickey MD .    Specialty: Family Medicine  Why: Please call to schedule a one week or earliest available follow-up appointment with PCP; BMP & BP monitoring  Contact information:  [place] [address] [phone]             rAt Dickerson MD. Schedule an appointment as soon as possible for a visit in 1 week(s).    Specialty: Urology  Why: 1-2 weeks after hospital discharge.  Contact  information:  [address]  [place] [address]  [phone]             Cony Lancaster MD PhD Follow up in 2 week(s).    Specialties: Hematology and Oncology, Hematology, Oncology  Why: 2-3 weeks after hospital discharge. Re-evaluation to discuss pathology results and treatment plan  Contact information:  [address]  [place] [address]  [phone]             Abelardo Post MD. Call.    Specialty: Gastroenterology  Why: Liver cirrhosis follow-up  Contact information:  [address]  [place] [address]  [phone]                         Additional Instructions for the Follow-ups that You Need to Schedule     Discharge Follow-up with PCP   As directed       Currently Documented PCP:    Cam Dickey MD    PCP Phone Number:    [phone]     Follow Up Details: Please call to schedule a one week or earliest available follow-up appointment with PCP; BMP & BP monitoring           Time Spent on Discharge:  Greater than 30 minutes      SHANNAN Ly  [facility]  11/01/22  09:26 EDT

## 2022-11-01 NOTE — NURSING NOTE
Pt returned to floor from radiology s/p liver bx with soft BPs (90s SBP). Reported to SHANNAN Carrasco who said okay to hold today's doses of lasix and spironolactone. Pt to resume those medications tomorrow as regularly scheduled.

## 2022-11-01 NOTE — CASE MANAGEMENT/SOCIAL WORK
Continued Stay Note  [facility]     Patient Name: Teto Castle  MRN: [id number]  Today's Date: 11/1/2022    Admit Date: 10/20/2022    Plan: [facility] with HH -- Accepted.   Discharge Plan     Row Name 11/01/22 1233       Plan    Plan [facility] HH -- Accepted.    Patient/Family in Agreement with Plan yes    Plan Comments Discharge orders noted. Patient is off the floor for a procedure. Patient previously gave [facility] permission to discuss discharge planning with his daughter/Chandni. Called/spoke with Chandni who said both she and the patient plan for him to d/c to [facility] with HH. [facility] offered to send HH referrals/setup HH, and Chandni declined stating they have a HH agency in-house that her staff will setup for the patient instead. Chandni said the AL room is available and the patient can admit anytime. She is requesting a Covid-19 Test prior to d/c. Updated BAILEY Chun who will discuss with A. Updated BAILEY Nation. Chandni asked for report to be called to: [phone], and for the discharge summary to be faxed to [phone]. Packet is on the chart. Await Covid-19 Test result.               Discharge Codes    No documentation.               Expected Discharge Date and Time     Expected Discharge Date Expected Discharge Time    Nov 1, 2022             Veda DAVIDSON RN

## 2022-11-02 LAB
LAB AP CASE REPORT: NORMAL
LAB AP CLINICAL INFORMATION: NORMAL
LAB AP DIAGNOSIS COMMENT: NORMAL
LAB AP INTRADEPARTMENTAL CONSULT: NORMAL
LAB AP SPECIAL STAINS: NORMAL
PATH REPORT.FINAL DX SPEC: NORMAL
PATH REPORT.GROSS SPEC: NORMAL

## 2022-11-02 NOTE — OUTREACH NOTE
Prep Survey    Flowsheet Row Responses   Spirit[facility] facility patient discharged from? [facility]   Is LACE score < 7 ? No   Emergency Room discharge w/ pulse ox? No   Eligibility Readm Mgmt   Discharge diagnosis urinary tract symptoms most likely due to complications of liver cirrhosis/ascites   Does the patient have one of the following disease processes/diagnoses(primary or secondary)? Other   Does the patient have Home health ordered? Yes   What is the Home health agency?  [facility] with HH   Is there a DME ordered? No   Prep survey completed? Yes          BLANCA AVENDAÑO - Registered Nurse

## 2022-11-02 NOTE — CASE MANAGEMENT/SOCIAL WORK
Case Management Discharge Note      Final Note: [facility] HH setup by [facility].    Provided Post Acute Provider List?: N/A  Provided Post Acute Provider Quality & Resource List?: N/A    Selected Continued Care - Discharged on 11/1/2022 Admission date: 10/20/2022 - Discharge disposition: Home or Self Care    Destination    No services have been selected for the patient.              Durable Medical Equipment    No services have been selected for the patient.              Dialysis/Infusion    No services have been selected for the patient.              Home Medical Care    No services have been selected for the patient.              Therapy    No services have been selected for the patient.              Community Resources    No services have been selected for the patient.              Community & DME    No services have been selected for the patient.                       Final Discharge Disposition Code: 01 - home or self-care

## 2022-11-04 ENCOUNTER — APPOINTMENT (OUTPATIENT)
Dept: GENERAL RADIOLOGY | Facility: HOSPITAL | Age: 82
End: 2022-11-04

## 2022-11-04 ENCOUNTER — HOSPITAL ENCOUNTER (INPATIENT)
Facility: HOSPITAL | Age: 82
LOS: 2 days | Discharge: HOME | End: 2022-11-06
Attending: EMERGENCY MEDICINE | Admitting: INTERNAL MEDICINE

## 2022-11-04 ENCOUNTER — READMISSION MANAGEMENT (OUTPATIENT)
Dept: CALL CENTER | Facility: HOSPITAL | Age: 82
End: 2022-11-04

## 2022-11-04 ENCOUNTER — APPOINTMENT (OUTPATIENT)
Dept: CT IMAGING | Facility: HOSPITAL | Age: 82
End: 2022-11-04

## 2022-11-04 DIAGNOSIS — E87.1 HYPONATREMIA: ICD-10-CM
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
## 2022-11-04 DIAGNOSIS — N39.0 ACUTE UTI: ICD-10-CM

## 2022-11-04 DIAGNOSIS — N17.9 AKI (ACUTE KIDNEY INJURY): Primary | ICD-10-CM

## 2022-11-04 DIAGNOSIS — R74.8 ELEVATED LIVER ENZYMES: ICD-10-CM

## 2022-11-04 DIAGNOSIS — K74.69 OTHER CIRRHOSIS OF LIVER: ICD-10-CM

## 2022-11-04 LAB
ALBUMIN SERPL-MCNC: 2.7 G/DL (ref 3.5–5.2)
ALBUMIN/GLOB SERPL: 1 G/DL
ALP SERPL-CCNC: 182 U/L (ref 39–117)
ALT SERPL W P-5'-P-CCNC: 46 U/L (ref 1–41)
AMMONIA BLD-SCNC: 42 UMOL/L (ref 16–60)
ANION GAP SERPL CALCULATED.3IONS-SCNC: 11.7 MMOL/L (ref 5–15)
AST SERPL-CCNC: 106 U/L (ref 1–40)
BACTERIA UR QL AUTO: ABNORMAL /HPF
BASOPHILS # BLD AUTO: 0.04 10*3/MM3 (ref 0–0.2)
BASOPHILS NFR BLD AUTO: 0.3 % (ref 0–1.5)
BILIRUB SERPL-MCNC: 3.8 MG/DL (ref 0–1.2)
BILIRUB UR QL STRIP: NEGATIVE
BUN SERPL-MCNC: 29 MG/DL (ref 8–23)
BUN/CREAT SERPL: 7.7 (ref 7–25)
CALCIUM SPEC-SCNC: 8.9 MG/DL (ref 8.6–10.5)
CHLORIDE SERPL-SCNC: 94 MMOL/L (ref 98–107)
CLARITY UR: ABNORMAL
CO2 SERPL-SCNC: 23.3 MMOL/L (ref 22–29)
COLOR UR: ABNORMAL
CREAT SERPL-MCNC: 3.77 MG/DL (ref 0.76–1.27)
D-LACTATE SERPL-SCNC: 2.8 MMOL/L (ref 0.5–2)
D-LACTATE SERPL-SCNC: 2.8 MMOL/L (ref 0.5–2)
D-LACTATE SERPL-SCNC: 3.5 MMOL/L (ref 0.5–2)
D-LACTATE SERPL-SCNC: 3.6 MMOL/L (ref 0.5–2)
DEPRECATED RDW RBC AUTO: 52.3 FL (ref 37–54)
EGFRCR SERPLBLD CKD-EPI 2021: 15.4 ML/MIN/1.73
EOSINOPHIL # BLD AUTO: 0.16 10*3/MM3 (ref 0–0.4)
EOSINOPHIL NFR BLD AUTO: 1.2 % (ref 0.3–6.2)
ERYTHROCYTE [DISTWIDTH] IN BLOOD BY AUTOMATED COUNT: 14.1 % (ref 12.3–15.4)
GLOBULIN UR ELPH-MCNC: 2.8 GM/DL
GLUCOSE SERPL-MCNC: 133 MG/DL (ref 65–99)
GLUCOSE UR STRIP-MCNC: NEGATIVE MG/DL
HCT VFR BLD AUTO: 30.5 % (ref 37.5–51)
HGB BLD-MCNC: 10.7 G/DL (ref 13–17.7)
HGB UR QL STRIP.AUTO: ABNORMAL
HOLD SPECIMEN: NORMAL
HOLD SPECIMEN: NORMAL
HYALINE CASTS UR QL AUTO: ABNORMAL /LPF
IMM GRANULOCYTES # BLD AUTO: 0.08 10*3/MM3 (ref 0–0.05)
IMM GRANULOCYTES NFR BLD AUTO: 0.6 % (ref 0–0.5)
INR PPP: 1.39 (ref 0.9–1.1)
KETONES UR QL STRIP: NEGATIVE
LEUKOCYTE ESTERASE UR QL STRIP.AUTO: ABNORMAL
LYMPHOCYTES # BLD AUTO: 1.45 10*3/MM3 (ref 0.7–3.1)
LYMPHOCYTES NFR BLD AUTO: 11 % (ref 19.6–45.3)
MAGNESIUM SERPL-MCNC: 2 MG/DL (ref 1.6–2.4)
MCH RBC QN AUTO: 35.8 PG (ref 26.6–33)
MCHC RBC AUTO-ENTMCNC: 35.1 G/DL (ref 31.5–35.7)
MCV RBC AUTO: 102 FL (ref 79–97)
MONOCYTES # BLD AUTO: 1.05 10*3/MM3 (ref 0.1–0.9)
MONOCYTES NFR BLD AUTO: 8 % (ref 5–12)
NEUTROPHILS NFR BLD AUTO: 10.35 10*3/MM3 (ref 1.7–7)
NEUTROPHILS NFR BLD AUTO: 78.9 % (ref 42.7–76)
NITRITE UR QL STRIP: NEGATIVE
NRBC BLD AUTO-RTO: 0.1 /100 WBC (ref 0–0.2)
PH UR STRIP.AUTO: <=5 [PH] (ref 5–8)
PLATELET # BLD AUTO: 104 10*3/MM3 (ref 140–450)
PMV BLD AUTO: 9.1 FL (ref 6–12)
POTASSIUM SERPL-SCNC: 5.3 MMOL/L (ref 3.5–5.2)
PROT SERPL-MCNC: 5.5 G/DL (ref 6–8.5)
PROT UR QL STRIP: ABNORMAL
PROTHROMBIN TIME: 17.2 SECONDS (ref 11.7–14.2)
RBC # BLD AUTO: 2.99 10*6/MM3 (ref 4.14–5.8)
RBC # UR STRIP: ABNORMAL /HPF
REF LAB TEST METHOD: ABNORMAL
SODIUM SERPL-SCNC: 129 MMOL/L (ref 136–145)
SP GR UR STRIP: 1.01 (ref 1–1.03)
SQUAMOUS #/AREA URNS HPF: ABNORMAL /HPF
TROPONIN T SERPL-MCNC: 0.1 NG/ML (ref 0–0.03)
UROBILINOGEN UR QL STRIP: ABNORMAL
WBC # UR STRIP: ABNORMAL /HPF
WBC NRBC COR # BLD: 13.13 10*3/MM3 (ref 3.4–10.8)
WHOLE BLOOD HOLD COAG: NORMAL
WHOLE BLOOD HOLD SPECIMEN: NORMAL
YEAST URNS QL MICRO: ABNORMAL /HPF

## 2022-11-04 PROCEDURE — 85025 COMPLETE CBC W/AUTO DIFF WBC: CPT

## 2022-11-04 PROCEDURE — 87040 BLOOD CULTURE FOR BACTERIA: CPT | Performed by: NURSE PRACTITIONER

## 2022-11-04 PROCEDURE — 70450 CT HEAD/BRAIN W/O DYE: CPT

## 2022-11-04 PROCEDURE — 82140 ASSAY OF AMMONIA: CPT | Performed by: EMERGENCY MEDICINE

## 2022-11-04 PROCEDURE — 87086 URINE CULTURE/COLONY COUNT: CPT | Performed by: EMERGENCY MEDICINE

## 2022-11-04 PROCEDURE — 71045 X-RAY EXAM CHEST 1 VIEW: CPT

## 2022-11-04 PROCEDURE — 93010 ELECTROCARDIOGRAM REPORT: CPT | Performed by: INTERNAL MEDICINE

## 2022-11-04 PROCEDURE — 87186 SC STD MICRODIL/AGAR DIL: CPT | Performed by: EMERGENCY MEDICINE

## 2022-11-04 PROCEDURE — 25010000002 ERTAPENEM PER 500 MG: Performed by: NURSE PRACTITIONER

## 2022-11-04 PROCEDURE — 83605 ASSAY OF LACTIC ACID: CPT | Performed by: EMERGENCY MEDICINE

## 2022-11-04 PROCEDURE — 80053 COMPREHEN METABOLIC PANEL: CPT

## 2022-11-04 PROCEDURE — 36415 COLL VENOUS BLD VENIPUNCTURE: CPT | Performed by: EMERGENCY MEDICINE

## 2022-11-04 PROCEDURE — 83735 ASSAY OF MAGNESIUM: CPT

## 2022-11-04 PROCEDURE — 99285 EMERGENCY DEPT VISIT HI MDM: CPT

## 2022-11-04 PROCEDURE — 93005 ELECTROCARDIOGRAM TRACING: CPT

## 2022-11-04 PROCEDURE — 84484 ASSAY OF TROPONIN QUANT: CPT

## 2022-11-04 PROCEDURE — 87077 CULTURE AEROBIC IDENTIFY: CPT | Performed by: EMERGENCY MEDICINE

## 2022-11-04 PROCEDURE — 81001 URINALYSIS AUTO W/SCOPE: CPT | Performed by: EMERGENCY MEDICINE

## 2022-11-04 PROCEDURE — 85610 PROTHROMBIN TIME: CPT | Performed by: EMERGENCY MEDICINE

## 2022-11-04 RX ORDER — ONDANSETRON 2 MG/ML
4 INJECTION INTRAMUSCULAR; INTRAVENOUS EVERY 6 HOURS PRN
Status: DISCONTINUED | OUTPATIENT
Start: 2022-11-04 | End: 2022-11-06 | Stop reason: HOSPADM

## 2022-11-04 RX ORDER — SODIUM CHLORIDE 9 MG/ML
75 INJECTION, SOLUTION INTRAVENOUS CONTINUOUS
Status: DISCONTINUED | OUTPATIENT
Start: 2022-11-04 | End: 2022-11-06 | Stop reason: HOSPADM

## 2022-11-04 RX ORDER — INSULIN LISPRO 100 [IU]/ML
0-9 INJECTION, SOLUTION INTRAVENOUS; SUBCUTANEOUS
Status: DISCONTINUED | OUTPATIENT
Start: 2022-11-05 | End: 2022-11-06 | Stop reason: HOSPADM

## 2022-11-04 RX ORDER — UREA 10 %
3 LOTION (ML) TOPICAL NIGHTLY PRN
Status: DISCONTINUED | OUTPATIENT
Start: 2022-11-04 | End: 2022-11-06 | Stop reason: HOSPADM

## 2022-11-04 RX ORDER — ONDANSETRON 4 MG/1
4 TABLET, FILM COATED ORAL EVERY 6 HOURS PRN
Status: DISCONTINUED | OUTPATIENT
Start: 2022-11-04 | End: 2022-11-06 | Stop reason: HOSPADM

## 2022-11-04 RX ORDER — NITROGLYCERIN 0.4 MG/1
0.4 TABLET SUBLINGUAL
Status: DISCONTINUED | OUTPATIENT
Start: 2022-11-04 | End: 2022-11-06 | Stop reason: HOSPADM

## 2022-11-04 RX ORDER — OXYCODONE AND ACETAMINOPHEN 7.5; 325 MG/1; MG/1
1 TABLET ORAL EVERY 4 HOURS PRN
Status: DISCONTINUED | OUTPATIENT
Start: 2022-11-04 | End: 2022-11-06 | Stop reason: HOSPADM

## 2022-11-04 RX ORDER — DEXTROSE MONOHYDRATE 25 G/50ML
25 INJECTION, SOLUTION INTRAVENOUS
Status: DISCONTINUED | OUTPATIENT
Start: 2022-11-04 | End: 2022-11-06 | Stop reason: HOSPADM

## 2022-11-04 RX ORDER — NICOTINE POLACRILEX 4 MG
15 LOZENGE BUCCAL
Status: DISCONTINUED | OUTPATIENT
Start: 2022-11-04 | End: 2022-11-06 | Stop reason: HOSPADM

## 2022-11-04 RX ORDER — SODIUM CHLORIDE 0.9 % (FLUSH) 0.9 %
10 SYRINGE (ML) INJECTION AS NEEDED
Status: DISCONTINUED | OUTPATIENT
Start: 2022-11-04 | End: 2022-11-06 | Stop reason: HOSPADM

## 2022-11-04 RX ORDER — CHOLECALCIFEROL (VITAMIN D3) 125 MCG
1000 CAPSULE ORAL DAILY
Status: DISCONTINUED | OUTPATIENT
Start: 2022-11-05 | End: 2022-11-06 | Stop reason: HOSPADM

## 2022-11-04 RX ORDER — ACETAMINOPHEN 325 MG/1
650 TABLET ORAL EVERY 4 HOURS PRN
Status: DISCONTINUED | OUTPATIENT
Start: 2022-11-04 | End: 2022-11-06 | Stop reason: HOSPADM

## 2022-11-04 RX ORDER — LACTULOSE 10 G/15ML
20 SOLUTION ORAL 3 TIMES DAILY
Status: DISCONTINUED | OUTPATIENT
Start: 2022-11-04 | End: 2022-11-06 | Stop reason: HOSPADM

## 2022-11-04 RX ORDER — FINASTERIDE 5 MG/1
5 TABLET, FILM COATED ORAL DAILY
Status: DISCONTINUED | OUTPATIENT
Start: 2022-11-05 | End: 2022-11-06 | Stop reason: HOSPADM

## 2022-11-04 RX ORDER — AMOXICILLIN 250 MG
2 CAPSULE ORAL DAILY
Status: COMPLETED | OUTPATIENT
Start: 2022-11-05 | End: 2022-11-06

## 2022-11-04 RX ADMIN — OXYCODONE HYDROCHLORIDE AND ACETAMINOPHEN 1 TABLET: 7.5; 325 TABLET ORAL at 21:49

## 2022-11-04 RX ADMIN — ERTAPENEM SODIUM 1 G: 1 INJECTION, POWDER, LYOPHILIZED, FOR SOLUTION INTRAMUSCULAR; INTRAVENOUS at 16:41

## 2022-11-04 RX ADMIN — SODIUM CHLORIDE 75 ML/HR: 9 INJECTION, SOLUTION INTRAVENOUS at 21:48

## 2022-11-04 NOTE — ED NOTES
".Nursing report ED to floor  Teto Castle  82 y.o.  male    HPI :   Chief Complaint   Patient presents with    Weakness - Generalized       Admitting doctor:   Ping Bradley MD    Admitting diagnosis:   The primary encounter diagnosis was SASCHA (acute kidney injury) (HCC). Diagnoses of Acute UTI, Hyponatremia, Other cirrhosis of liver (HCC), Elevated liver enzymes, and Fall, initial encounter were also pertinent to this visit.    Code status:   Current Code Status       Date Active Code Status Order ID Comments User Context       Prior            Allergies:   Morphine and Penicillins    Isolation:   No active isolations    Intake and Output  No intake or output data in the 24 hours ending 11/04/22 1707    Weight:       11/04/22  1201   Weight: 97.1 kg (214 lb)       Most recent vitals:   Vitals:    11/04/22 1201 11/04/22 1401 11/04/22 1601 11/04/22 1706   BP: 105/60 137/80 119/67 113/53   BP Location: Right arm  Left arm    Patient Position: Lying  Lying    Pulse: 91 83 84 80   Resp: 20  18 18   Temp:       SpO2: 95% 98% 95% 96%   Weight: 97.1 kg (214 lb)      Height: 175.3 cm (69\")          Active LDAs/IV Access:   Lines, Drains & Airways       Active LDAs       Name Placement date Placement time Site Days    Peripheral IV 11/04/22 Left Antecubital 11/04/22  --  Antecubital  less than 1    Nephrostomy Left 8 Fr. 10/05/22  1602  Left  30    Suprapubic Catheter Latex 24 Fr. 09/30/22  1815  -- 34                    Labs (abnormal labs have a star):   Labs Reviewed   COMPREHENSIVE METABOLIC PANEL - Abnormal; Notable for the following components:       Result Value    Glucose 133 (*)     BUN 29 (*)     Creatinine 3.77 (*)     Sodium 129 (*)     Potassium 5.3 (*)     Chloride 94 (*)     Total Protein 5.5 (*)     Albumin 2.70 (*)     ALT (SGPT) 46 (*)     AST (SGOT) 106 (*)     Alkaline Phosphatase 182 (*)     Total Bilirubin 3.8 (*)     eGFR 15.4 (*)     All other components within normal limits    Narrative: "     GFR Normal >60  Chronic Kidney Disease <60  Kidney Failure <15    The GFR formula is only valid for adults with stable renal function between ages 18 and 70.   TROPONIN (IN-HOUSE) - Abnormal; Notable for the following components:    Troponin T 0.103 (*)     All other components within normal limits    Narrative:     Troponin T Reference Range:  <= 0.03 ng/mL-   Negative for AMI  >0.03 ng/mL-     Abnormal for myocardial necrosis.  Clinicians would have to utilize clinical acumen, EKG, Troponin and serial changes to determine if it is an Acute Myocardial Infarction or myocardial injury due to an underlying chronic condition.       Results may be falsely decreased if patient taking Biotin.     CBC WITH AUTO DIFFERENTIAL - Abnormal; Notable for the following components:    WBC 13.13 (*)     RBC 2.99 (*)     Hemoglobin 10.7 (*)     Hematocrit 30.5 (*)     .0 (*)     MCH 35.8 (*)     Platelets 104 (*)     Neutrophil % 78.9 (*)     Lymphocyte % 11.0 (*)     Immature Grans % 0.6 (*)     Neutrophils, Absolute 10.35 (*)     Monocytes, Absolute 1.05 (*)     Immature Grans, Absolute 0.08 (*)     All other components within normal limits   URINALYSIS W/ CULTURE IF INDICATED - Abnormal; Notable for the following components:    Color, UA Dark Yellow (*)     Appearance, UA Turbid (*)     Blood, UA Large (3+) (*)     Protein, UA >=300 mg/dL (3+) (*)     Leuk Esterase, UA Large (3+) (*)     All other components within normal limits    Narrative:     In absence of clinical symptoms, the presence of pyuria, bacteria, and/or nitrites on the urinalysis result does not correlate with infection.   PROTIME-INR - Abnormal; Notable for the following components:    Protime 17.2 (*)     INR 1.39 (*)     All other components within normal limits   LACTIC ACID, PLASMA - Abnormal; Notable for the following components:    Lactate 3.5 (*)     All other components within normal limits   LACTIC ACID, REFLEX - Abnormal; Notable for the  following components:    Lactate 3.6 (*)     All other components within normal limits   URINALYSIS, MICROSCOPIC ONLY - Abnormal; Notable for the following components:    RBC, UA 13-20 (*)     WBC, UA Too Numerous to Count (*)     Bacteria, UA 3+ (*)     Squamous Epithelial Cells, UA 3-6 (*)     All other components within normal limits   MAGNESIUM - Normal   AMMONIA - Normal   URINE CULTURE   BLOOD CULTURE   BLOOD CULTURE   RAINBOW DRAW    Narrative:     The following orders were created for panel order [place] Draw.  Procedure                               Abnormality         Status                     ---------                               -----------         ------                     Green Top (Gel)[[id number]]                                  Final result               Lavender Top[[id number]]                                     Final result               Tristan Top[[id number]]                                         Final result               Light Blue Top[[id number]]                                   Final result                 Please view results for these tests on the individual orders.   LACTIC ACID, REFLEX   CBC AND DIFFERENTIAL    Narrative:     The following orders were created for panel order CBC & Differential.  Procedure                               Abnormality         Status                     ---------                               -----------         ------                     CBC Auto Differential[[id number]]        Abnormal            Final result                 Please view results for these tests on the individual orders.   GREEN TOP   LAVENDER TOP   GRAY TOP   LIGHT BLUE TOP       EKG:   ECG 12 Lead ED Triage Standing Order; Weak / Dizzy / AMS   Preliminary Result   HEART RATE= 77  bpm   RR Interval= 779  ms   PA Interval= 221  ms   P Horizontal Axis= -40  deg   P Front Axis= 78  deg   QRSD Interval= 109  ms   QT Interval= 407  ms   QRS Axis= 7  deg   T Wave Axis= 77  deg   - ABNORMAL ECG -    Sinus rhythm   Prolonged DE interval   Inferior infarct, old   Anterior infarct, old   Electronically Signed By:    Date and Time of Study: 2022-11-04 12:51:07          Meds given in ED:   Medications   sodium chloride 0.9 % flush 10 mL (has no administration in time range)   ertapenem (INVanz) 1 g in sodium chloride 0.9 % 100 mL IVPB-VTB (1 g Intravenous New Bag 11/4/22 5621)       Imaging results:  CT Head Without Contrast    Result Date: 11/4/2022   No CT evidence for acute intracranial pathology. The etiology of the patient's confusion is not further elucidated on this examination; and if further assessment is required, one could obtain an MRI of the brain for follow-up.  Radiation dose reduction techniques were utilized, including automated exposure control and exposure modulation based on body size.  This report was finalized on 11/4/2022 1:38 PM by Dr. Buddy Barros M.D.      XR Chest 1 View    Result Date: 11/4/2022  No focal pulmonary consolidation. Follow-up as clinical indications persist.  This report was finalized on 11/4/2022 12:43 PM by Dr. Kedra Olsen M.D.       Ambulatory status:   - bedrest    Social issues:   Social History     Socioeconomic History    Marital status:    Tobacco Use    Smoking status: Former     Types: Cigars, Cigarettes    Smokeless tobacco: Current     Types: Chew, Snuff    Tobacco comments:     25 YEARS AGO   Vaping Use    Vaping Use: Never used   Substance and Sexual Activity    Alcohol use: Yes     Comment: DRINKS BEERS EVERY OTHER DAY    Drug use: No    Sexual activity: Defer       NIH Stroke Scale:         Michelle Head RN  11/04/22 17:07 EDT

## 2022-11-04 NOTE — ED PROVIDER NOTES
MD ATTESTATION NOTE    The SAUNDRA and I have discussed this patient's history, physical exam, and treatment plan.  I have reviewed the documentation and personally had a face to face interaction with the patient. I affirm the documentation and agree with the treatment and plan.  The attached note describes my personal findings.    I provided a substantive portion of the care of this patient. I personally performed the physical exam, in its entirety.    Teto Castle is a 82 y.o. male who presents to the ED c/o having a fall this morning without injury, feeling not quite right, generalized weakness, being hypotensive, and confused.  He reports last month he had a bladder surgery.  He seems confused is and is not able to provide much history.  He denies pain.  He denies hitting his head.  History is obtained from his old chart.  He does have a history of cirrhosis.      On exam:  GENERAL: Awake, alert, no acute distress oriented to place but not time  SKIN: Warm, dry  HENT: Normocephalic, atraumatic  EYES: no scleral icterus  CV: regular rhythm, regular rate  RESPIRATORY: normal effort, lungs clear  ABDOMEN: soft, nontender, nondistended, suprapubic bladder catheter in place  MUSCULOSKELETAL: no deformity  NEURO: alert, moves all extremities, follows commands    Labs  No results found for this or any previous visit (from the past 24 hour(s)).    Radiology  No Radiology Exams Resulted Within Past 24 Hours    Medical Decision Making:  ED Course as of 11/07/22 1447   Fri Nov 04, 2022   1302 EKG          EKG time: 1251  Rhythm/Rate: Normal sinus, rate 77  P waves and GA: Normal P, normal GA  QRS, axis: Partial left bundle branch block, left axis deviation  ST and T waves: No acute    Interpreted Contemporaneously by me, independently viewed  Not significantly changed compared to prior 8/1/2022   [TR]   1314 Speaking with Dr. Barros with neuroradiology.  CT shows no acute. [TR]   4168 Updated patient's daughter on labs  and workup today.  Daughter states that they found out today the results of the liver bx-patient has liver cancer.  She states that patient does not want to undergo treatment and they were told he may have several months left.  They want to try to get pt home with Hospice.  [MS]   1400 Pt's BUN and creatinine  were normal 3 days ago and today are 29 & 3.77. [MS]   1400 IV ertapenem 1 g ordered for UTI, urine culture and blood cultures ordered. [MS]   1559 Consult Note    Discussed care with Dr Bradley  Reviewed patient's history, exam, results and need for admission secondary to SASCHA, Hyponatremia, UTI, cirrhosis  Dr. Bradley accepts the patient to be admitted to telemetry inpatient bed.     [MS]      ED Course User Index  [MS] Trupti Jolley APRN  [TR] David Romano MD       With his confusion, plan to check chemistries, blood counts, ammonia, lactic acid, urinalysis.  We will CT his head given his confusion and his fall.  He has no external signs of trauma and no pain on palpation of his extremities or torso or spines.  We will evaluate for hepatic encephalopathy, UTI, sepsis    Procedures:  Procedures      PPE: The patient wore a mask and I wore an N95 mask throughout the entire patient encounter.      The patient has started, but not completed, their COVID-19 vaccination series.    Diagnosis  Final diagnoses:   SASCHA (acute kidney injury) (HCC)   Acute UTI   Hyponatremia   Other cirrhosis of liver (HCC)   Elevated liver enzymes   Fall, initial encounter       Note Disclaimer: At [facility], we believe that sharing information builds trust and better relationships. You are receiving this note because you recently visited [facility]. It is possible you will see health information before a provider has talked with you about it. This kind of information can be easy to misunderstand. To help you fully understand what it means for your health, we urge you to discuss this note with your provider.      David Romano MD  11/04/22 1447       David Romano MD  11/07/22 7306

## 2022-11-04 NOTE — ED TRIAGE NOTES
".Pt masked on arrival, staff masked    Pt from [facility] via ems, s/p bladder surgery last month, family this am requested transport for confusion, pt answering questions appropriately but reports a fall over night, reports \"not feeling right\" and generalized weakness, noted hypotensive   "

## 2022-11-04 NOTE — ED PROVIDER NOTES
EMERGENCY DEPARTMENT ENCOUNTER    Room Number:  S513/1  Date of encounter:  11/4/2022  PCP: Cam Dickey MD  Historian: Patient and daughter      PPE    Patient was placed in face mask in first look. Patient was wearing facemask when I entered the room and throughout our encounter. I wore full protective equipment throughout this patient encounter including a face mask, and gloves. Hand hygiene was performed before donning protective equipment and after removal when leaving the room.        HPI:  Chief Complaint: Fall and confusion  A complete HPI/ROS/PMH/PSH/SH/FH are unobtainable due to: Confusion    Context: Teto Castle is a 82 y.o. male with a history of HTN, cirrhosis, ascites, DM, frequent UTIs who arrives to the ED via EMS from the assisted living facility.  Patient states that he had a fall this morning, denies injury or hitting his head.  Does complain of generalized weakness.  Daughter at bedside states that patient has been confused.  Patient was just discharged on November 1 after a lengthy hospital stay at which time he had a suprapubic catheter and nephrostomy tube.  Majority of history obtained from daughter at bedside.  Limited HPI and review of symptoms due to patient's confusion.        PAST MEDICAL HISTORY  Active Ambulatory Problems     Diagnosis Date Noted   • BPH with obstruction/lower urinary tract symptoms 09/10/2018   • Essential hypertension 09/10/2018   • Type 2 diabetes mellitus without complication (HCC) 09/10/2018   • B12 deficiency 09/10/2018   • Syncope and collapse 09/28/2021   • Cytokine release syndrome, grade 1 08/04/2022   • Acquired bladder diverticulum 09/30/2022   • Surgery, elective 09/30/2022   • History of MI (myocardial infarction)    • Cirrhosis (HCC)    • Generalized weakness 10/21/2022   • Ascites 10/21/2022   • Metabolic encephalopathy 10/21/2022   • UTI (urinary tract infection) due to urinary indwelling catheter (HCC) 10/21/2022   • Portal hypertension  (HCC) 10/21/2022   • BPH with obstruction/lower urinary tract symptoms 10/21/2022   • Hyponatremia 10/21/2022   • Anemia, chronic disease 10/21/2022   • 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   • Hypokalemia 10/28/2022     Resolved Ambulatory Problems     Diagnosis Date Noted   • No Resolved Ambulatory Problems     Past Medical History:   Diagnosis Date   • Aneurysm of aorta (HCC)    • Anxiety and depression    • Arthritis    • BPH (benign prostatic hyperplasia)    • Chronic UTI    • Diabetes mellitus (HCC)    • H/O esophageal varices    • Hard of hearing    • History of frequent urinary tract infections    • Hypertension    • Pulmonary nodules    • Self-catheterizes urinary bladder          PAST SURGICAL HISTORY  Past Surgical History:   Procedure Laterality Date   • CATARACT EXTRACTION, BILATERAL     • COLONOSCOPY  approx 2017    normal per patient   • CYSTECTOMY N/A 9/30/2022    Procedure: Bladder Diverticulectomy;  Surgeon: Art Dickerson MD;  Location: [facility] OR;  Service: Urology;  Laterality: N/A;   • CYSTOSCOPY TRANSURETHRAL RESECTION OF PROSTATE N/A 9/10/2018    Procedure: TRANSURETHRAL RESECTION OF PROSTATE;  Surgeon: Art Dickerson MD;  Location: [facility] OR;  Service: Urology   • ENDOSCOPIC FUNCTIONAL SINUS SURGERY (FESS) N/A 5/15/2019    Procedure: ETHMOIDECTOMY,LEFT INTERNASAL  ANTROSTOMY;  Surgeon: Mark Linda MD;  Location: [facility] OR [facility];  Service: ENT   • FRACTURE SURGERY      LT ARM   • HEMORRHOIDECTOMY     • SEPTOPLASTY N/A 5/15/2019    Procedure: NASAL SEPTOPLASTY,;  Surgeon: Mark Linda MD;  Location: [facility] OR [facility];  Service: ENT   • SUPRAPUBIC TUBE PLACEMENT N/A 9/30/2022    Procedure: SUPRAPUBIC CATHETER INSERTION;  Surgeon: Art Dickerson MD;  Location: [facility] MAIN OR;  Service: Urology;  Laterality: N/A;         FAMILY HISTORY  Family History   Problem Relation Age of Onset   • Malig Hyperthermia Neg Hx          SOCIAL HISTORY  Social  History     Socioeconomic History   • Marital status:    Tobacco Use   • Smoking status: Former     Types: Cigars, Cigarettes   • Smokeless tobacco: Current     Types: Chew, Snuff   • Tobacco comments:     25 YEARS AGO   Vaping Use   • Vaping Use: Never used   Substance and Sexual Activity   • Alcohol use: Yes     Comment: DRINKS BEERS EVERY OTHER DAY   • Drug use: No   • Sexual activity: Defer         ALLERGIES  Morphine and Penicillins        REVIEW OF SYSTEMS  Review of Systems     Limited due to confusion       PHYSICAL EXAM    ED Triage Vitals   Temp Heart Rate Resp BP SpO2   11/04/22 1125 11/04/22 1125 11/04/22 1125 11/04/22 1125 11/04/22 1125   97.9 °F (36.6 °C) 75 18 97/45 96 %       Physical Exam  GENERAL: Well appearing, nontoxic appearing, not distressed  HENT: normocephalic, atraumatic  EYES: no scleral icterus, PERRL  CV: regular rhythm, regular rate, no murmur  RESPIRATORY: normal effort, CTAB  ABDOMEN: soft , nontender, suprapubic catheter in place with dark urine noted in bag. Left nephrostomy tube in place, also with dark urine noted in bag  MUSCULOSKELETAL: no deformity  NEURO: alert, moves all extremities, follows commands, mental status normal/baseline  SKIN: warm, dry, no rash   Psych: Appropriate mood and affect  Nursing notes and vital signs reviewed      LAB RESULTS  Recent Results (from the past 24 hour(s))   Comprehensive Metabolic Panel    Collection Time: 11/04/22 12:09 PM    Specimen: Blood   Result Value Ref Range    Glucose 133 (H) 65 - 99 mg/dL    BUN 29 (H) 8 - 23 mg/dL    Creatinine 3.77 (H) 0.76 - 1.27 mg/dL    Sodium 129 (L) 136 - 145 mmol/L    Potassium 5.3 (H) 3.5 - 5.2 mmol/L    Chloride 94 (L) 98 - 107 mmol/L    CO2 23.3 22.0 - 29.0 mmol/L    Calcium 8.9 8.6 - 10.5 mg/dL    Total Protein 5.5 (L) 6.0 - 8.5 g/dL    Albumin 2.70 (L) 3.50 - 5.20 g/dL    ALT (SGPT) 46 (H) 1 - 41 U/L    AST (SGOT) 106 (H) 1 - 40 U/L    Alkaline Phosphatase 182 (H) 39 - 117 U/L    Total  Bilirubin 3.8 (H) 0.0 - 1.2 mg/dL    Globulin 2.8 gm/dL    A/G Ratio 1.0 g/dL    BUN/Creatinine Ratio 7.7 7.0 - 25.0    Anion Gap 11.7 5.0 - 15.0 mmol/L    eGFR 15.4 (L) >60.0 mL/min/1.73   Troponin    Collection Time: 11/04/22 12:09 PM    Specimen: Blood   Result Value Ref Range    Troponin T 0.103 (C) 0.000 - 0.030 ng/mL   Magnesium    Collection Time: 11/04/22 12:09 PM    Specimen: Blood   Result Value Ref Range    Magnesium 2.0 1.6 - 2.4 mg/dL   Green Top (Gel)    Collection Time: 11/04/22 12:09 PM   Result Value Ref Range    Extra Tube Hold for add-ons.    Lavender Top    Collection Time: 11/04/22 12:09 PM   Result Value Ref Range    Extra Tube hold for add-on    Gray Top    Collection Time: 11/04/22 12:09 PM   Result Value Ref Range    Extra Tube Hold for add-ons.    Light Blue Top    Collection Time: 11/04/22 12:09 PM   Result Value Ref Range    Extra Tube Hold for add-ons.    CBC Auto Differential    Collection Time: 11/04/22 12:09 PM    Specimen: Blood   Result Value Ref Range    WBC 13.13 (H) 3.40 - 10.80 10*3/mm3    RBC 2.99 (L) 4.14 - 5.80 10*6/mm3    Hemoglobin 10.7 (L) 13.0 - 17.7 g/dL    Hematocrit 30.5 (L) 37.5 - 51.0 %    .0 (H) 79.0 - 97.0 fL    MCH 35.8 (H) 26.6 - 33.0 pg    MCHC 35.1 31.5 - 35.7 g/dL    RDW 14.1 12.3 - 15.4 %    RDW-SD 52.3 37.0 - 54.0 fl    MPV 9.1 6.0 - 12.0 fL    Platelets 104 (L) 140 - 450 10*3/mm3    Neutrophil % 78.9 (H) 42.7 - 76.0 %    Lymphocyte % 11.0 (L) 19.6 - 45.3 %    Monocyte % 8.0 5.0 - 12.0 %    Eosinophil % 1.2 0.3 - 6.2 %    Basophil % 0.3 0.0 - 1.5 %    Immature Grans % 0.6 (H) 0.0 - 0.5 %    Neutrophils, Absolute 10.35 (H) 1.70 - 7.00 10*3/mm3    Lymphocytes, Absolute 1.45 0.70 - 3.10 10*3/mm3    Monocytes, Absolute 1.05 (H) 0.10 - 0.90 10*3/mm3    Eosinophils, Absolute 0.16 0.00 - 0.40 10*3/mm3    Basophils, Absolute 0.04 0.00 - 0.20 10*3/mm3    Immature Grans, Absolute 0.08 (H) 0.00 - 0.05 10*3/mm3    nRBC 0.1 0.0 - 0.2 /100 WBC   Protime-INR     Collection Time: 11/04/22 12:09 PM    Specimen: Blood   Result Value Ref Range    Protime 17.2 (H) 11.7 - 14.2 Seconds    INR 1.39 (H) 0.90 - 1.10   Lactic Acid, Plasma    Collection Time: 11/04/22 12:09 PM    Specimen: Blood   Result Value Ref Range    Lactate 3.5 (C) 0.5 - 2.0 mmol/L   ECG 12 Lead ED Triage Standing Order; Weak / Dizzy / AMS    Collection Time: 11/04/22 12:51 PM   Result Value Ref Range    QT Interval 407 ms   Urinalysis With Culture If Indicated - Urine, Catheter    Collection Time: 11/04/22  1:55 PM    Specimen: Urine, Catheter   Result Value Ref Range    Color, UA Dark Yellow (A) Yellow, Straw    Appearance, UA Turbid (A) Clear    pH, UA <=5.0 5.0 - 8.0    Specific Gravity, UA 1.013 1.005 - 1.030    Glucose, UA Negative Negative    Ketones, UA Negative Negative    Bilirubin, UA Negative Negative    Blood, UA Large (3+) (A) Negative    Protein, UA >=300 mg/dL (3+) (A) Negative    Leuk Esterase, UA Large (3+) (A) Negative    Nitrite, UA Negative Negative    Urobilinogen, UA 0.2 E.U./dL 0.2 - 1.0 E.U./dL   Urinalysis, Microscopic Only - Urine, Catheter    Collection Time: 11/04/22  1:55 PM    Specimen: Urine, Catheter   Result Value Ref Range    RBC, UA 13-20 (A) None Seen, 0-2 /HPF    WBC, UA Too Numerous to Count (A) None Seen, 0-2 /HPF    Bacteria, UA 3+ (A) None Seen /HPF    Squamous Epithelial Cells, UA 3-6 (A) None Seen, 0-2 /HPF    Yeast, UA Moderate/2+ Budding Yeast None Seen /HPF    Hyaline Casts, UA 0-2 None Seen /LPF    Methodology Manual Light Microscopy    Ammonia    Collection Time: 11/04/22  2:17 PM    Specimen: Blood   Result Value Ref Range    Ammonia 42 16 - 60 umol/L   STAT Lactic Acid, Reflex    Collection Time: 11/04/22  3:10 PM    Specimen: Blood   Result Value Ref Range    Lactate 3.6 (C) 0.5 - 2.0 mmol/L   STAT Lactic Acid, Reflex    Collection Time: 11/04/22  6:37 PM    Specimen: Blood   Result Value Ref Range    Lactate 2.8 (C) 0.5 - 2.0 mmol/L   STAT Lactic Acid, Reflex     Collection Time: 11/04/22  9:37 PM    Specimen: Blood   Result Value Ref Range    Lactate 2.8 (C) 0.5 - 2.0 mmol/L       Ordered the above labs and independently reviewed the results.      RADIOLOGY  CT Head Without Contrast    Result Date: 11/4/2022  CT HEAD WITHOUT CONTRAST  CLINICAL HISTORY: Confusion.  TECHNIQUE: CT scan of the head was obtained with 3 mm axial soft tissue algorithm images. No intravenous contrast was administered. Sagittal and coronal reconstructions were obtained.  COMPARISON: No previous similar studies are available for comparison.  FINDINGS:  The ventricles, sulci, and cisterns are age appropriate. There are mild changes of chronic small vessel ischemic phenomena. The basal ganglia and thalami are unremarkable in appearance. The gray-white matter differentiation is within normal limits. The posterior fossa structures are unremarkable.       No CT evidence for acute intracranial pathology. The etiology of the patient's confusion is not further elucidated on this examination; and if further assessment is required, one could obtain an MRI of the brain for follow-up.  Radiation dose reduction techniques were utilized, including automated exposure control and exposure modulation based on body size.  This report was finalized on 11/4/2022 1:38 PM by Dr. Budyd Barros M.D.      XR Chest 1 View    Result Date: 11/4/2022  XR CHEST 1 VW-  HISTORY: Male who is 82 years-old,  weakness  TECHNIQUE: Frontal views of the chest  COMPARISON: None available  FINDINGS: Heart, mediastinum and pulmonary vasculature are unremarkable. No focal pulmonary consolidation, pleural effusion, or pneumothorax. Old granulomatous disease is apparent. No acute osseous process.      No focal pulmonary consolidation. Follow-up as clinical indications persist.  This report was finalized on 11/4/2022 12:43 PM by Dr. Kedar Olsen M.D.        I ordered the above noted radiological studies and viewed the images on the PACS  system.       EKG      Independently viewed by me and interpreted by Dr Romano         MEDICAL RECORD REVIEW  Medical records reviewed in [facility], Pt was admitted from 10/20-11/1/2022 for generalized weakness, hypokalemia, Metabolic encephalopathy.  Pt underwent paracentesis for ascites with removal of 1.4 L. Pt had suprapubic catheter and left nephrostomy tube per Urology.  Pt had CT Guided liver bx to r/o hepatocellular carcinoma.        PROCEDURES    Procedures        DIFFERENTIAL DIAGNOSIS  Differential Diagnosis for Weakness include but not limited to the following:  Generalized weakness, CVA, TIA, Acute MI, GI Bleed, UTI, Sepsis,      PROGRESS, DATA ANALYSIS, CONSULTS, AND MEDICAL DECISION MAKING        ED Course as of 11/04/22 2301   Fri Nov 04, 2022   1302 EKG          EKG time: 1251  Rhythm/Rate: Normal sinus, rate 77  P waves and KY: Normal P, normal KY  QRS, axis: Partial left bundle branch block, left axis deviation  ST and T waves: No acute    Interpreted Contemporaneously by me, independently viewed  Not significantly changed compared to prior 8/1/2022   [TR]   1314 Speaking with Dr. Barros with neuroradiology.  CT shows no acute. [TR]   1330 Updated patient's daughter on labs and workup today.  Daughter states that they found out today the results of the liver bx-patient has liver cancer.  She states that patient does not want to undergo treatment and they were told he may have several months left.  They want to try to get pt home with Hospice.  [MS]   1400 Pt's BUN and creatinine  were normal 3 days ago and today are 29 & 3.77. [MS]   1400 IV ertapenem 1 g ordered for UTI, urine culture and blood cultures ordered. [MS]   1559 Consult Note    Discussed care with Dr Bradley  Reviewed patient's history, exam, results and need for admission secondary to SASCHA, Hyponatremia, UTI, cirrhosis  Dr. Bradley accepts the patient to be admitted to telemetry inpatient bed.     [MS]      ED Course User Index  [MS]  Trupti Jolley, APRN  [TR] David Romano MD     ADMISSION    Discussed treatment plan and reason for admission with pt/family and admitting physician.  Pt/family voiced understanding of the plan for admission for further testing/treatment as needed.      DIAGNOSIS  Final diagnoses:   SASCHA (acute kidney injury) (HCC)   Acute UTI   Hyponatremia   Other cirrhosis of liver (HCC)   Elevated liver enzymes   Fall, initial encounter         MEDICATIONS GIVEN IN ED    Medications   sodium chloride 0.9 % flush 10 mL (has no administration in time range)   nitroglycerin (NITROSTAT) SL tablet 0.4 mg (has no administration in time range)   acetaminophen (TYLENOL) tablet 650 mg (has no administration in time range)   ondansetron (ZOFRAN) tablet 4 mg (has no administration in time range)     Or   ondansetron (ZOFRAN) injection 4 mg (has no administration in time range)   melatonin tablet 3 mg (has no administration in time range)   ertapenem (INVanz) 1 g in sodium chloride 0.9 % 100 mL IVPB-VTB (has no administration in time range)   sodium chloride 0.9 % infusion (75 mL/hr Intravenous New Bag 11/4/22 2148)   Cariprazine HCl (VRAYLAR) capsule capsule 4.5 mg (4.5 mg Oral Not Given 11/4/22 2206)   finasteride (PROSCAR) tablet 5 mg (has no administration in time range)   lactulose (CHRONULAC) 10 GM/15ML solution 20 g (20 g Oral Not Given 11/4/22 2206)   oxyCODONE-acetaminophen (PERCOCET) 7.5-325 MG per tablet 1 tablet (1 tablet Oral Given 11/4/22 2149)   sennosides-docusate (PERICOLACE) 8.6-50 MG per tablet 2 tablet (has no administration in time range)   vitamin B-12 (CYANOCOBALAMIN) tablet 1,000 mcg (has no administration in time range)   dextrose (GLUTOSE) oral gel 15 g (has no administration in time range)   dextrose (D50W) (25 g/50 mL) IV injection 25 g (has no administration in time range)   glucagon (human recombinant) (GLUCAGEN DIAGNOSTIC) injection 1 mg (has no administration in time range)   insulin lispro  (ADMELOG) injection 0-9 Units (has no administration in time range)   ertapenem (INVanz) 1 g in sodium chloride 0.9 % 100 mL IVPB-VTB (0 g Intravenous Stopped 11/4/22 0670)           COURSE & MEDICAL DECISION MAKING  Any/All labs and Any/All Imaging studies that were ordered were reviewed and are noted above.  Results were reviewed/discussed with the patient and they were also made aware of online access.    Pt also made aware that some labs, such as cultures, will not be resulted during ER visit and followup with PMD is necessary.        Trupti Jolley, APRN  11/04/22 3390

## 2022-11-04 NOTE — OUTREACH NOTE
Medical Week 1 Survey    Flowsheet Row Responses   [facility] patient discharged from? [place]   Does the patient have one of the following disease processes/diagnoses(primary or secondary)? Other   Week 1 attempt successful? No  [Patient currently in ED]   Unsuccessful attempts Attempt 2          AUSTIN MARKHAM - Registered Nurse

## 2022-11-05 ENCOUNTER — APPOINTMENT (OUTPATIENT)
Dept: ULTRASOUND IMAGING | Facility: HOSPITAL | Age: 82
End: 2022-11-05

## 2022-11-05 LAB
ANION GAP SERPL CALCULATED.3IONS-SCNC: 13 MMOL/L (ref 5–15)
BUN SERPL-MCNC: 31 MG/DL (ref 8–23)
BUN/CREAT SERPL: 11.5 (ref 7–25)
CALCIUM SPEC-SCNC: 8.7 MG/DL (ref 8.6–10.5)
CHLORIDE SERPL-SCNC: 95 MMOL/L (ref 98–107)
CO2 SERPL-SCNC: 22 MMOL/L (ref 22–29)
CREAT SERPL-MCNC: 2.69 MG/DL (ref 0.76–1.27)
D-LACTATE SERPL-SCNC: 2.3 MMOL/L (ref 0.5–2)
D-LACTATE SERPL-SCNC: 2.4 MMOL/L (ref 0.5–2)
DEPRECATED RDW RBC AUTO: 52.9 FL (ref 37–54)
EGFRCR SERPLBLD CKD-EPI 2021: 23.1 ML/MIN/1.73
ERYTHROCYTE [DISTWIDTH] IN BLOOD BY AUTOMATED COUNT: 14 % (ref 12.3–15.4)
GLUCOSE BLDC GLUCOMTR-MCNC: 114 MG/DL (ref 70–130)
GLUCOSE BLDC GLUCOMTR-MCNC: 125 MG/DL (ref 70–130)
GLUCOSE BLDC GLUCOMTR-MCNC: 159 MG/DL (ref 70–130)
GLUCOSE BLDC GLUCOMTR-MCNC: 164 MG/DL (ref 70–130)
GLUCOSE SERPL-MCNC: 127 MG/DL (ref 65–99)
HBA1C MFR BLD: 4.6 % (ref 4.8–5.6)
HCT VFR BLD AUTO: 32.3 % (ref 37.5–51)
HGB BLD-MCNC: 11 G/DL (ref 13–17.7)
MCH RBC QN AUTO: 35.1 PG (ref 26.6–33)
MCHC RBC AUTO-ENTMCNC: 34.1 G/DL (ref 31.5–35.7)
MCV RBC AUTO: 103.2 FL (ref 79–97)
PLATELET # BLD AUTO: 107 10*3/MM3 (ref 140–450)
PMV BLD AUTO: 9.2 FL (ref 6–12)
POTASSIUM SERPL-SCNC: 4.8 MMOL/L (ref 3.5–5.2)
QT INTERVAL: 407 MS
RBC # BLD AUTO: 3.13 10*6/MM3 (ref 4.14–5.8)
SODIUM SERPL-SCNC: 130 MMOL/L (ref 136–145)
WBC NRBC COR # BLD: 12.01 10*3/MM3 (ref 3.4–10.8)

## 2022-11-05 PROCEDURE — 82962 GLUCOSE BLOOD TEST: CPT

## 2022-11-05 PROCEDURE — 83036 HEMOGLOBIN GLYCOSYLATED A1C: CPT | Performed by: INTERNAL MEDICINE

## 2022-11-05 PROCEDURE — 25010000002 ERTAPENEM PER 500 MG: Performed by: INTERNAL MEDICINE

## 2022-11-05 PROCEDURE — 85027 COMPLETE CBC AUTOMATED: CPT | Performed by: INTERNAL MEDICINE

## 2022-11-05 PROCEDURE — 80048 BASIC METABOLIC PNL TOTAL CA: CPT | Performed by: INTERNAL MEDICINE

## 2022-11-05 PROCEDURE — 83605 ASSAY OF LACTIC ACID: CPT | Performed by: EMERGENCY MEDICINE

## 2022-11-05 PROCEDURE — 76775 US EXAM ABDO BACK WALL LIM: CPT

## 2022-11-05 RX ADMIN — SODIUM CHLORIDE 75 ML/HR: 9 INJECTION, SOLUTION INTRAVENOUS at 09:12

## 2022-11-05 RX ADMIN — Medication 1000 MCG: at 09:13

## 2022-11-05 RX ADMIN — LACTULOSE 20 G: 20 SOLUTION ORAL at 09:13

## 2022-11-05 RX ADMIN — OXYCODONE HYDROCHLORIDE AND ACETAMINOPHEN 1 TABLET: 7.5; 325 TABLET ORAL at 21:30

## 2022-11-05 RX ADMIN — DOCUSATE SODIUM 50MG AND SENNOSIDES 8.6MG 2 TABLET: 8.6; 5 TABLET, FILM COATED ORAL at 09:13

## 2022-11-05 RX ADMIN — ERTAPENEM SODIUM 500 MG: 1 INJECTION, POWDER, LYOPHILIZED, FOR SOLUTION INTRAMUSCULAR; INTRAVENOUS at 22:40

## 2022-11-05 RX ADMIN — CARIPRAZINE 4.5 MG: 1.5 CAPSULE, GELATIN COATED ORAL at 18:15

## 2022-11-05 RX ADMIN — LACTULOSE 20 G: 20 SOLUTION ORAL at 20:22

## 2022-11-05 RX ADMIN — LACTULOSE 20 G: 20 SOLUTION ORAL at 18:12

## 2022-11-05 RX ADMIN — FINASTERIDE 5 MG: 5 TABLET, FILM COATED ORAL at 09:13

## 2022-11-05 NOTE — PLAN OF CARE
Problem: Adult Inpatient Plan of Care  Goal: Plan of Care Review  Outcome: Ongoing, Progressing  Flowsheets (Taken 11/5/2022 0401)  Progress: no change  Plan of Care Reviewed With: patient  Outcome Evaluation:   Pt admitted tonight with SASCHA and UTI   IV antibiotics started in ER   Pt is alert to self and place   NS at 75   CT head negative   Pt has subrapubic cath and left neph drain   Palliative to meet with family   PRN pain med given x1   will continue to monitor  Goal: Patient-Specific Goal (Individualized)  Outcome: Ongoing, Progressing  Goal: Absence of Hospital-Acquired Illness or Injury  Outcome: Ongoing, Progressing  Intervention: Identify and Manage Fall Risk  Recent Flowsheet Documentation  Taken 11/5/2022 0353 by Bev Carlson RN  Safety Promotion/Fall Prevention:   room organization consistent   safety round/check completed   nonskid shoes/slippers when out of bed  Taken 11/5/2022 0212 by Bev Carlson RN  Safety Promotion/Fall Prevention:   room organization consistent   safety round/check completed   nonskid shoes/slippers when out of bed  Taken 11/4/2022 2347 by Bev Carlson RN  Safety Promotion/Fall Prevention:   safety round/check completed   room organization consistent   nonskid shoes/slippers when out of bed  Taken 11/4/2022 2156 by Bev Carlson RN  Safety Promotion/Fall Prevention:   safety round/check completed   room organization consistent   nonskid shoes/slippers when out of bed  Taken 11/4/2022 1959 by Bev Carlson RN  Safety Promotion/Fall Prevention:   activity supervised   nonskid shoes/slippers when out of bed   room organization consistent   safety round/check completed  Intervention: Prevent Skin Injury  Recent Flowsheet Documentation  Taken 11/5/2022 0353 by Bev Carlson RN  Body Position:   position changed independently   supine  Taken 11/5/2022 0212 by Bev Carlson RN  Body Position:   position changed independently    supine  Taken 11/4/2022 2347 by Bev Carlson RN  Body Position:   left   position changed independently  Taken 11/4/2022 2156 by Bev Carlson RN  Body Position:   position changed independently   supine  Taken 11/4/2022 1959 by Bev Carlson RN  Body Position:   right   position changed independently  Intervention: Prevent and Manage VTE (Venous Thromboembolism) Risk  Recent Flowsheet Documentation  Taken 11/5/2022 0353 by Bve Carlson RN  VTE Prevention/Management:   bilateral   sequential compression devices on  Taken 11/5/2022 0212 by Bev Carlson RN  VTE Prevention/Management:   bilateral   sequential compression devices on  Taken 11/4/2022 1959 by Bev Carlson RN  Activity Management: activity adjusted per tolerance  VTE Prevention/Management:   bilateral   sequential compression devices off  Intervention: Prevent Infection  Recent Flowsheet Documentation  Taken 11/5/2022 0353 by Bev Carlson RN  Infection Prevention: rest/sleep promoted  Taken 11/5/2022 0212 by Bev Carlson RN  Infection Prevention: rest/sleep promoted  Taken 11/4/2022 2347 by Bev Carlson RN  Infection Prevention: rest/sleep promoted  Taken 11/4/2022 2156 by Bev Carlson RN  Infection Prevention: rest/sleep promoted  Taken 11/4/2022 1959 by Bev Carlson RN  Infection Prevention: rest/sleep promoted  Goal: Optimal Comfort and Wellbeing  Outcome: Ongoing, Progressing  Intervention: Provide Person-Centered Care  Recent Flowsheet Documentation  Taken 11/4/2022 1959 by Bev Carlson RN  Trust Relationship/Rapport: care explained  Goal: Readiness for Transition of Care  Outcome: Ongoing, Progressing  Intervention: Mutually Develop Transition Plan  Recent Flowsheet Documentation  Taken 11/4/2022 1957 by Bev Carlson RN  Transportation Anticipated: family or friend will provide  Patient/Family Anticipated Services at Transition: case  manager  Patient/Family Anticipates Transition to: home with family  Taken 11/4/2022 1956 by Bev Carlson, RN  Equipment Currently Used at Home: walker, rolling   Goal Outcome Evaluation:  Plan of Care Reviewed With: patient        Progress: no change  Outcome Evaluation: Pt admitted tonight with SASCHA and UTI; IV antibiotics started in ER; Pt is alert to self and place; NS at 75; CT head negative; Pt has subrapubic cath and left neph drain; Palliative to meet with family; PRN pain med given x1; will continue to monitor

## 2022-11-05 NOTE — OUTREACH NOTE
Medical Week 1 Survey    Flowsheet Row Responses   [facility] patient discharged from? [place]   Does the patient have one of the following disease processes/diagnoses(primary or secondary)? Other   Week 1 attempt successful? No   Unsuccessful attempts Attempt 1   Revoke Readmitted          ROSHAN MARKHAM - Registered Nurse

## 2022-11-05 NOTE — PROGRESS NOTES
Name: Teto Castle ADMIT: 2022   : 1940  PCP: Cam Dickey MD    MRN: [id number] LOS: 1 days   AGE/SEX: 82 y.o. male  ROOM: [facility]     Subjective   Subjective   Patient more lethargic this morning.  Has no complaints.  Discussed with family at bedside who states that he is not eating much at this point.  Also not really getting out of bed.    Review of Systems    Neg for CP, SOA, N/V/D    Objective   Objective   Vital Signs  Temp:  [97.9 °F (36.6 °C)-99.1 °F (37.3 °C)] 98.4 °F (36.9 °C)  Heart Rate:  [77-85] 79  Resp:  [18] 18  BP: ()/(40-80) 97/40  SpO2:  [94 %-98 %] 98 %  on  Flow (L/min):  [2] 2;   Device (Oxygen Therapy): room air  Body mass index is 32.61 kg/m².  Physical Exam  Constitutional:       Appearance: He is obese.      Comments: Groggy; chronically ill-appearing   Cardiovascular:      Rate and Rhythm: Normal rate and regular rhythm.      Heart sounds: No murmur heard.    No gallop.   Pulmonary:      Effort: Pulmonary effort is normal. No respiratory distress.      Breath sounds: Normal breath sounds. No wheezing.   Abdominal:      General: Abdomen is flat. There is no distension.      Palpations: Abdomen is soft.      Tenderness: There is no abdominal tenderness.   Genitourinary:     Comments: Suprapubic catheter  Skin:     General: Skin is warm.      Coloration: Skin is not pale.   Neurological:      General: No focal deficit present.      Mental Status: He is alert. He is disoriented.         Results Review     I reviewed the patient's new clinical results.  Results from last 7 days   Lab Units 11/05/22  0428 11/04/22  1209 11/01/22  0434 10/31/22  0429   WBC 10*3/mm3 12.01* 13.13* 6.38 7.24   HEMOGLOBIN g/dL 11.0* 10.7* 11.5* 11.0*   PLATELETS 10*3/mm3 107* 104* 121* 108*     Results from last 7 days   Lab Units 11/05/22  0428 11/04/22  1209 11/01/22  0434 10/31/22  0429   SODIUM mmol/L 130* 129* 133* 132*   POTASSIUM mmol/L 4.8 5.3* 3.8 3.7   CHLORIDE mmol/L 95*  94* 98 99   CO2 mmol/L 22.0 23.3 24.8 24.1   BUN mg/dL 31* 29* 9 8   CREATININE mg/dL 2.69* 3.77* 0.78 0.80   GLUCOSE mg/dL 127* 133* 225* 139*   EGFR mL/min/1.73 23.1* 15.4* 89.6 88.9     Results from last 7 days   Lab Units 11/04/22  1209 11/01/22  0434 10/31/22  0429 10/30/22  0551   ALBUMIN g/dL 2.70* 2.90* 2.70* 2.60*   BILIRUBIN mg/dL 3.8* 2.6* 3.1* 3.1*   ALK PHOS U/L 182* 118* 110 107   AST (SGOT) U/L 106* 53* 45* 57*   ALT (SGPT) U/L 46* 22 23 26     Results from last 7 days   Lab Units 11/05/22  0428 11/04/22  1209 11/01/22  0434 10/31/22  0429 10/30/22  0551   CALCIUM mg/dL 8.7 8.9 9.0 8.5* 8.2*   ALBUMIN g/dL  --  2.70* 2.90* 2.70* 2.60*   MAGNESIUM mg/dL  --  2.0  --   --   --      Results from last 7 days   Lab Units 11/05/22  0512 11/05/22 0428 11/04/22 2137 11/04/22  1837   LACTATE mmol/L 2.3* 2.4* 2.8* 2.8*     Hemoglobin A1C   Date/Time Value Ref Range Status   11/05/2022 0428 4.60 (L) 4.80 - 5.60 % Final     Glucose   Date/Time Value Ref Range Status   11/05/2022 1024 125 70 - 130 mg/dL Final     Comment:     Meter: [id number] : [id number] Charles GOMEZ   11/05/2022 0608 114 70 - 130 mg/dL Final     Comment:     Meter: [id number] : [id number] Brandon GOMEZ       CT Head Without Contrast    Result Date: 11/4/2022   No CT evidence for acute intracranial pathology. The etiology of the patient's confusion is not further elucidated on this examination; and if further assessment is required, one could obtain an MRI of the brain for follow-up.  Radiation dose reduction techniques were utilized, including automated exposure control and exposure modulation based on body size.  This report was finalized on 11/4/2022 1:38 PM by Dr. Buddy Barros M.D.      XR Chest 1 View    Result Date: 11/4/2022  No focal pulmonary consolidation. Follow-up as clinical indications persist.  This report was finalized on 11/4/2022 12:43 PM by Dr. Kedar Olsen M.D.      Scheduled  Medications  Cariprazine HCl, 4.5 mg, Oral, Q PM  ertapenem, 500 mg, Intravenous, Q24H  finasteride, 5 mg, Oral, Daily  insulin lispro, 0-9 Units, Subcutaneous, TID With Meals  lactulose, 20 g, Oral, TID  sennosides-docusate, 2 tablet, Oral, Daily  vitamin B-12, 1,000 mcg, Oral, Daily    Infusions  sodium chloride, 75 mL/hr, Last Rate: 75 mL/hr (11/05/22 0912)    Diet  Diet Regular; Cardiac       Assessment/Plan     Active Hospital Problems    Diagnosis  POA   • **SASCHA (acute kidney injury) (HCC) [N17.9]  Yes   • Cirrhosis (HCC) [K74.60]  Yes   • 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       82 y.o. male admitted with SASCHA (acute kidney injury) (HCC).    Sepsis 2/2 UTI?  Generalized weakness  Mechanical fall  Hypotension, resolved  -WBC 13; lactate 3.6 OA (likely continued elevation in setting of chronic liver disease)  -CTH w/out acute intracranial abnormality  -UA w/ 3+ leuks, numerous WBCs, 3+ bacteria  -started on Ertapenem given prior hx of ESBL UTI    SASCHA  -Crt 3.77 OA (b/l ~0.9) > 2.69  -Renal US w/out hydronephrosis  -consult Renal    Cirrhosis  -cont lactulose    DM2  -SSI    HCC  -recently diagnosed via liver bx 11/1/22    TCP  Hyponatremia  -in setting of cirrhosis    · SCDs for DVT prophylaxis.  · Full code.  · Discussed with patient.  · Anticipate discharge home with family timing yet to be determined.      Warren Dean MD  [facility]  11/05/22  12:25 EDT

## 2022-11-05 NOTE — H&P
HISTORY AND PHYSICAL   [facility]        Date of Admission: 2022  Patient Identification:  Name: Teto aCstle  Age: 82 y.o.  Sex: male  :  1940  MRN: [id number]                     Primary Care Physician: Cma Dickey MD    Chief Complaint:  82 year old gentleman who presented with weakness, malaise and confusion;he fell prior to coming in; he was discharged three days ago; he had a nephrostomy tube and suprapubic catheter placed during the admission; he had a biopsy of his liver and family was told that he had cancer but the patient is not aware; the patient is not able to give a good history; family is present at the bedside    History of Present Illness:   As above    Past Medical History:  Past Medical History:   Diagnosis Date   • Aneurysm of aorta (HCC)    • Anxiety and depression    • Arthritis    • BPH (benign prostatic hyperplasia)    • Chronic UTI    • Cirrhosis (HCC)    • Diabetes mellitus (HCC)    • H/O esophageal varices    • Hard of hearing    • History of frequent urinary tract infections    • History of MI (myocardial infarction)     STATES WAS TOLD HE HAD IN PAST, NEVER SAW CARDIOLOGY   • Hypertension    • Pulmonary nodules    • Self-catheterizes urinary bladder      Past Surgical History:  Past Surgical History:   Procedure Laterality Date   • CATARACT EXTRACTION, BILATERAL     • COLONOSCOPY  approx 2017    normal per patient   • CYSTECTOMY N/A 2022    Procedure: Bladder Diverticulectomy;  Surgeon: Art Dickerson MD;  Location: [facility];  Service: Urology;  Laterality: N/A;   • CYSTOSCOPY TRANSURETHRAL RESECTION OF PROSTATE N/A 9/10/2018    Procedure: TRANSURETHRAL RESECTION OF PROSTATE;  Surgeon: Art Dickerson MD;  Location: [facility];  Service: Urology   • ENDOSCOPIC FUNCTIONAL SINUS SURGERY (FESS) N/A 5/15/2019    Procedure: ETHMOIDECTOMY,LEFT INTERNASAL  ANTROSTOMY;  Surgeon: Mark Linda MD;  Location: [facility];   Service: ENT   • FRACTURE SURGERY      LT ARM   • HEMORRHOIDECTOMY     • SEPTOPLASTY N/A 5/15/2019    Procedure: NASAL SEPTOPLASTY,;  Surgeon: aMrk Linda MD;  [place] [facility] [place] [facility];  Service: ENT   • SUPRAPUBIC TUBE PLACEMENT N/A 9/30/2022    Procedure: SUPRAPUBIC CATHETER INSERTION;  Surgeon: Art Dickerson MD;  Location: [facility] MAIN OR;  Service: Urology;  Laterality: N/A;      Home Meds:  Medications Prior to Admission   Medication Sig Dispense Refill Last Dose   • Cariprazine HCl (VRAYLAR) 1.5 MG capsule capsule Take 4.5 mg by mouth Every Evening.      • finasteride (PROSCAR) 5 MG tablet Take 5 mg by mouth Daily.      • furosemide (LASIX) 20 MG tablet Take 3 tablets by mouth Daily for 30 days. 90 tablet 0    • Insulin Degludec (TRESIBA FLEXTOUCH SC) Inject 12-15 Units under the skin into the appropriate area as directed As Needed. PER SLIDING SCALE      • lactulose (CHRONULAC) 10 GM/15ML solution Take 30 mL by mouth 3 (Three) Times a Day. 30 mL 0    • meloxicam (MOBIC) 7.5 MG tablet Take 7.5 mg by mouth Daily. TO HOLD 1 WEEK BEFORE SURGERY      • metFORMIN (GLUCOPHAGE) 500 MG tablet Take 1 tablet by mouth 2 (Two) Times a Day With Meals.      • oxyCODONE-acetaminophen (PERCOCET) 7.5-325 MG per tablet Take 1 tablet by mouth Every 4 (Four) Hours As Needed for Moderate Pain. 30 tablet 0    • pioglitazone (ACTOS) 30 MG tablet Take 30 mg by mouth Daily.      • potassium chloride 10 MEQ CR tablet Take 1 tablet by mouth Daily. 30 tablet 0    • sennosides-docusate (PERICOLACE) 8.6-50 MG per tablet Take 2 tablets by mouth Daily for 30 days. 60 tablet 0    • spironolactone (ALDACTONE) 100 MG tablet Take 1 tablet by mouth Daily. 30 tablet 0    • vitamin B-12 (CYANOCOBALAMIN) 500 MCG tablet Take 2 tablets by mouth Daily.          Allergies:  Allergies   Allergen Reactions   • Morphine Itching and Confusion   • Penicillins Unknown - Low Severity     REACTION UNKNOWN; patient reports no allergy to pcn.    "    Immunizations:  Immunization History   Administered Date(s) Administered   • COVID-19 (PFIZER) PURPLE CAP 2021   • Fluzone High-Dose 65+yrs 2022   • Tdap 2019     Social History:   Social History     Social History Narrative   • Not on file     Social History     Socioeconomic History   • Marital status:    Tobacco Use   • Smoking status: Former     Types: Cigars, Cigarettes   • Smokeless tobacco: Current     Types: Chew, Snuff   • Tobacco comments:     25 YEARS AGO   Vaping Use   • Vaping Use: Never used   Substance and Sexual Activity   • Alcohol use: Yes     Comment: DRINKS BEERS EVERY OTHER DAY   • Drug use: No   • Sexual activity: Defer       Family History:  Family History   Problem Relation Age of Onset   • Malig Hyperthermia Neg Hx         Review of Systems  Not obtainable from the patient    Objective:  T Max 24 hrs: Temp (24hrs), Av.9 °F (36.6 °C), Min:97.9 °F (36.6 °C), Max:97.9 °F (36.6 °C)    Vitals Ranges:   Temp:  [97.9 °F (36.6 °C)] 97.9 °F (36.6 °C)  Heart Rate:  [75-91] 85  Resp:  [18-20] 18  BP: ()/(44-80) 96/44      Exam:  BP 96/44 (BP Location: Left arm, Patient Position: Lying)   Pulse 85   Temp 97.9 °F (36.6 °C) (Oral)   Resp 18   Ht 175.3 cm (69\")   Wt 95.1 kg (209 lb 11.2 oz)   SpO2 95%   BMI 30.97 kg/m²     General Appearance:    drowsy, cooperative, no distress, appears stated age   Head:    Normocephalic, without obvious abnormality, atraumatic   Eyes:    PERRL, conjunctivae/corneas clear, EOM's intact, both eyes   Ears:    Normal external ear canals, both ears   Nose:   Nares normal, septum midline, mucosa normal, no drainage    or sinus tenderness   Throat:   Lips, mucosa, and tongue normal   Neck:   Supple, symmetrical, trachea midline, no adenopathy;     thyroid:  no enlargement/tenderness/nodules; no carotid    bruit or JVD   Back:     Symmetric, no curvature, ROM normal, no CVA tenderness   Lungs:     Decreased breath sounds bilaterally, " respirations unlabored   Chest Wall:    No tenderness or deformity    Heart:    Regular rate and rhythm, S1 and S2 normal, no murmur, rub   or gallop   Abdomen:     Soft, nontender, bowel sounds active all four quadrants,     no masses, no hepatomegaly, no splenomegaly   Extremities:   Extremities normal, atraumatic, no cyanosis or edema   Pulses:   2+ and symmetric all extremities   Skin:   Skin color, texture, turgor normal, no rashes or lesions               .    Data Review:  Labs in chart were reviewed.  WBC   Date Value Ref Range Status   11/04/2022 13.13 (H) 3.40 - 10.80 10*3/mm3 Final     Hemoglobin   Date Value Ref Range Status   11/04/2022 10.7 (L) 13.0 - 17.7 g/dL Final     Hematocrit   Date Value Ref Range Status   11/04/2022 30.5 (L) 37.5 - 51.0 % Final     Platelets   Date Value Ref Range Status   11/04/2022 104 (L) 140 - 450 10*3/mm3 Final     Sodium   Date Value Ref Range Status   11/04/2022 129 (L) 136 - 145 mmol/L Final     Potassium   Date Value Ref Range Status   11/04/2022 5.3 (H) 3.5 - 5.2 mmol/L Final     Chloride   Date Value Ref Range Status   11/04/2022 94 (L) 98 - 107 mmol/L Final     CO2   Date Value Ref Range Status   11/04/2022 23.3 22.0 - 29.0 mmol/L Final     BUN   Date Value Ref Range Status   11/04/2022 29 (H) 8 - 23 mg/dL Final     Creatinine   Date Value Ref Range Status   11/04/2022 3.77 (H) 0.76 - 1.27 mg/dL Final     Glucose   Date Value Ref Range Status   11/04/2022 133 (H) 65 - 99 mg/dL Final     Calcium   Date Value Ref Range Status   11/04/2022 8.9 8.6 - 10.5 mg/dL Final     Magnesium   Date Value Ref Range Status   11/04/2022 2.0 1.6 - 2.4 mg/dL Final     AST (SGOT)   Date Value Ref Range Status   11/04/2022 106 (H) 1 - 40 U/L Final     ALT (SGPT)   Date Value Ref Range Status   11/04/2022 46 (H) 1 - 41 U/L Final     Alkaline Phosphatase   Date Value Ref Range Status   11/04/2022 182 (H) 39 - 117 U/L Final                Imaging Results (All)     Procedure Component Value  Units Date/Time    CT Head Without Contrast [[id number]] Collected: 11/04/22 1318     Updated: 11/04/22 1341    Narrative:      CT HEAD WITHOUT CONTRAST     CLINICAL HISTORY: Confusion.     TECHNIQUE: CT scan of the head was obtained with 3 mm axial soft tissue  algorithm images. No intravenous contrast was administered. Sagittal and  coronal reconstructions were obtained.     COMPARISON: No previous similar studies are available for comparison.     FINDINGS:     The ventricles, sulci, and cisterns are age appropriate. There are mild  changes of chronic small vessel ischemic phenomena. The basal ganglia  and thalami are unremarkable in appearance. The gray-white matter  differentiation is within normal limits. The posterior fossa structures  are unremarkable.       Impression:         No CT evidence for acute intracranial pathology. The etiology of the  patient's confusion is not further elucidated on this examination; and  if further assessment is required, one could obtain an MRI of the brain  for follow-up.     Radiation dose reduction techniques were utilized, including automated  exposure control and exposure modulation based on body size.     This report was finalized on 11/4/2022 1:38 PM by Dr. Buddy Barros M.D.       XR Chest 1 View [[id number]] Collected: 11/04/22 1242     Updated: 11/04/22 1246    Narrative:      XR CHEST 1 VW-     HISTORY: Male who is 82 years-old,  weakness     TECHNIQUE: Frontal views of the chest     COMPARISON: None available     FINDINGS: Heart, mediastinum and pulmonary vasculature are unremarkable.  No focal pulmonary consolidation, pleural effusion, or pneumothorax. Old  granulomatous disease is apparent. No acute osseous process.       Impression:      No focal pulmonary consolidation. Follow-up as clinical  indications persist.     This report was finalized on 11/4/2022 12:43 PM by Dr. Kedar Olsen M.D.               Assessment:  Active Hospital Problems    Diagnosis  POA    • **SASCHA (acute kidney injury) (HCC) [N17.9]  Yes      Resolved Hospital Problems   No resolved problems to display.   cirrhosis  Metabolic encephalopathy  Diabetes  Cad  Hyperkalemia  hyponatremia    Plan:  Will hydrate gently  Family is interested in comfort care  Will place a palliative care consult  Trend labs  Monitor on telemetry  dw patient and family as well as ED provider    Ping Bradley MD  11/4/2022  21:14 EDT

## 2022-11-06 VITALS
TEMPERATURE: 97.3 F | RESPIRATION RATE: 20 BRPM | DIASTOLIC BLOOD PRESSURE: 62 MMHG | WEIGHT: 200.2 LBS | HEIGHT: 69 IN | SYSTOLIC BLOOD PRESSURE: 116 MMHG | BODY MASS INDEX: 29.65 KG/M2 | OXYGEN SATURATION: 100 % | HEART RATE: 77 BPM

## 2022-11-06 LAB
D-LACTATE SERPL-SCNC: 1.6 MMOL/L (ref 0.5–2)
GLUCOSE BLDC GLUCOMTR-MCNC: 121 MG/DL (ref 70–130)
GLUCOSE BLDC GLUCOMTR-MCNC: 126 MG/DL (ref 70–130)

## 2022-11-06 PROCEDURE — 82962 GLUCOSE BLOOD TEST: CPT

## 2022-11-06 PROCEDURE — 83605 ASSAY OF LACTIC ACID: CPT | Performed by: STUDENT IN AN ORGANIZED HEALTH CARE EDUCATION/TRAINING PROGRAM

## 2022-11-06 RX ORDER — LORAZEPAM 2 MG/ML
2 INJECTION INTRAMUSCULAR
Status: DISCONTINUED | OUTPATIENT
Start: 2022-11-06 | End: 2022-11-06 | Stop reason: HOSPADM

## 2022-11-06 RX ORDER — LORAZEPAM 2 MG/ML
1 INJECTION INTRAMUSCULAR
Status: DISCONTINUED | OUTPATIENT
Start: 2022-11-06 | End: 2022-11-06 | Stop reason: HOSPADM

## 2022-11-06 RX ORDER — LORAZEPAM 2 MG/ML
0.5 INJECTION INTRAMUSCULAR
Status: DISCONTINUED | OUTPATIENT
Start: 2022-11-06 | End: 2022-11-06 | Stop reason: HOSPADM

## 2022-11-06 RX ORDER — ACETAMINOPHEN 650 MG/1
650 SUPPOSITORY RECTAL EVERY 4 HOURS PRN
Status: DISCONTINUED | OUTPATIENT
Start: 2022-11-06 | End: 2022-11-06 | Stop reason: HOSPADM

## 2022-11-06 RX ORDER — LORAZEPAM 2 MG/ML
2 CONCENTRATE ORAL
Status: DISCONTINUED | OUTPATIENT
Start: 2022-11-06 | End: 2022-11-06 | Stop reason: HOSPADM

## 2022-11-06 RX ORDER — ACETAMINOPHEN 160 MG/5ML
650 SOLUTION ORAL EVERY 4 HOURS PRN
Status: DISCONTINUED | OUTPATIENT
Start: 2022-11-06 | End: 2022-11-06 | Stop reason: HOSPADM

## 2022-11-06 RX ORDER — LORAZEPAM 2 MG/ML
0.5 CONCENTRATE ORAL
Status: DISCONTINUED | OUTPATIENT
Start: 2022-11-06 | End: 2022-11-06 | Stop reason: HOSPADM

## 2022-11-06 RX ORDER — DIPHENOXYLATE HYDROCHLORIDE AND ATROPINE SULFATE 2.5; .025 MG/1; MG/1
1 TABLET ORAL
Status: DISCONTINUED | OUTPATIENT
Start: 2022-11-06 | End: 2022-11-06 | Stop reason: HOSPADM

## 2022-11-06 RX ORDER — ACETAMINOPHEN 325 MG/1
650 TABLET ORAL EVERY 4 HOURS PRN
Status: DISCONTINUED | OUTPATIENT
Start: 2022-11-06 | End: 2022-11-06 | Stop reason: HOSPADM

## 2022-11-06 RX ORDER — LORAZEPAM 2 MG/ML
1 CONCENTRATE ORAL
Status: DISCONTINUED | OUTPATIENT
Start: 2022-11-06 | End: 2022-11-06 | Stop reason: HOSPADM

## 2022-11-06 RX ADMIN — FINASTERIDE 5 MG: 5 TABLET, FILM COATED ORAL at 11:41

## 2022-11-06 RX ADMIN — LACTULOSE 20 G: 20 SOLUTION ORAL at 11:40

## 2022-11-06 RX ADMIN — SODIUM CHLORIDE 75 ML/HR: 9 INJECTION, SOLUTION INTRAVENOUS at 05:01

## 2022-11-06 RX ADMIN — OXYCODONE HYDROCHLORIDE AND ACETAMINOPHEN 1 TABLET: 7.5; 325 TABLET ORAL at 14:21

## 2022-11-06 RX ADMIN — Medication 1000 MCG: at 11:40

## 2022-11-06 RX ADMIN — DOCUSATE SODIUM 50MG AND SENNOSIDES 8.6MG 2 TABLET: 8.6; 5 TABLET, FILM COATED ORAL at 11:41

## 2022-11-06 RX ADMIN — OXYCODONE HYDROCHLORIDE AND ACETAMINOPHEN 1 TABLET: 7.5; 325 TABLET ORAL at 11:42

## 2022-11-06 NOTE — PROGRESS NOTES
Enter Query Response Below      Query Response:       Sepsis present on admit, ruled in 2/2 UTI       If applicable, please update the problem list.     Patient: Teto Castle        : 1940  Account: [id number]           Admit Date: 2022        How to Respond to this query:       a. Click New Note     b. Answer query within the yellow box.                c. Update the Problem List, if applicable.      If you have any questions about this query contact me at: [email or web address]     Dr. Dean:     Patient admitted on  with acute kidney injury. Physician progress note on  listed sepsis as a diagnosis. Patient presented with WBC count 13.13 and lactate 3.5. Patient was treated with IV Invanz before transitioning to comfort measures only. Sepsis was not listed as a discharge diagnosis.     After study, can you further clarify the sepsis as:    Sepsis present on admit, ruled in   Sepsis ruled out   Other (please specify)_______________  Clinically indeterminable     By submitting this query, we are merely seeking further clarification of documentation to accurately reflect all conditions that you are monitoring, evaluating, treating or that extend the hospitalization or utilize additional resources of care. Please utilize your independent clinical judgment when addressing the question(s) above.     This query and your response, once completed, will be entered into the legal medical record.    Sincerely,  Re OMORE RN, CCDS   Clinical Documentation Integrity Program

## 2022-11-06 NOTE — PROGRESS NOTES
"Enter Query Response Below      Query Response:     Clinically indeterminable          If applicable, please update the problem list.     Patient: Teto Castle        : 1940  Account: [id number]           Admit Date:         How to Respond to this query:       a. Click New Note     b. Answer query within the yellow box.                c. Update the Problem List, if applicable.      If you have any questions about this query contact me at: [email or web address]     Dr. Dean:     Patient admitted on  with acute kidney injury. Physician progress note on  included the following documentation: \"Sepsis 2/2 UTI?\" Patient has a suprapubic catheter in place. Preliminary urine cultures were positive for gram negative Bacilli. Patient was treated with IV Invanz before transitioning to comfort measures. Urinary tract infection was not listed as a discharge diagnosis.     After study, can you further clarify the urinary tract infection as:    Urinary tract infection due to suprapubic catheter, ruled in   Urinary tract infection unrelated to suprapubic catheter, ruled in   Urinary tract infection ruled out   Other (please specify)_________________  Clinically indeterminable     By submitting this query, we are merely seeking further clarification of documentation to accurately reflect all conditions that you are monitoring, evaluating, treating or that extend the hospitalization or utilize additional resources of care. Please utilize your independent clinical judgment when addressing the question(s) above.     This query and your response, once completed, will be entered into the legal medical record.    Sincerely,  Re MOORE RN, CCDS                                    Clinical Documentation Integrity Program   "

## 2022-11-06 NOTE — PROGRESS NOTES
Name: Teto Castle ADMIT: 2022   : 1940  PCP: Cam Dickey MD    MRN: [id number] LOS: 2 days   AGE/SEX: 82 y.o. male  ROOM: [facility]     Subjective   Subjective       Review of Systems    Neg for CP, SOA, N/V/D    Objective   Objective   Vital Signs  Temp:  [97.3 °F (36.3 °C)-99 °F (37.2 °C)] 97.3 °F (36.3 °C)  Heart Rate:  [77-86] 77  Resp:  [16-20] 20  BP: (106-125)/(56-63) 116/62  SpO2:  [94 %-100 %] 100 %  on   ;   Device (Oxygen Therapy): room air  Body mass index is 29.56 kg/m².  Physical Exam  Constitutional:       Appearance: He is obese.      Comments: Groggy; chronically ill-appearing   Cardiovascular:      Rate and Rhythm: Normal rate and regular rhythm.      Heart sounds: No murmur heard.    No gallop.   Pulmonary:      Effort: Pulmonary effort is normal. No respiratory distress.      Breath sounds: Normal breath sounds. No wheezing.   Abdominal:      General: Abdomen is flat. There is no distension.      Palpations: Abdomen is soft.      Tenderness: There is no abdominal tenderness.   Genitourinary:     Comments: Suprapubic catheter  Skin:     General: Skin is warm.      Coloration: Skin is not pale.   Neurological:      General: No focal deficit present.      Mental Status: He is alert. He is disoriented.         Results Review     I reviewed the patient's new clinical results.  Results from last 7 days   Lab Units 11/05/22  0428 11/04/22  1209 11/01/22  0434 10/31/22  0429   WBC 10*3/mm3 12.01* 13.13* 6.38 7.24   HEMOGLOBIN g/dL 11.0* 10.7* 11.5* 11.0*   PLATELETS 10*3/mm3 107* 104* 121* 108*     Results from last 7 days   Lab Units 11/05/22  0428 11/04/22  1209 11/01/22  0434 10/31/22  0429   SODIUM mmol/L 130* 129* 133* 132*   POTASSIUM mmol/L 4.8 5.3* 3.8 3.7   CHLORIDE mmol/L 95* 94* 98 99   CO2 mmol/L 22.0 23.3 24.8 24.1   BUN mg/dL 31* 29* 9 8   CREATININE mg/dL 2.69* 3.77* 0.78 0.80   GLUCOSE mg/dL 127* 133* 225* 139*   EGFR mL/min/1.73 23.1* 15.4* 89.6 88.9     Results  from last 7 days   Lab Units 11/04/22  1209 11/01/22  0434 10/31/22  0429   ALBUMIN g/dL 2.70* 2.90* 2.70*   BILIRUBIN mg/dL 3.8* 2.6* 3.1*   ALK PHOS U/L 182* 118* 110   AST (SGOT) U/L 106* 53* 45*   ALT (SGPT) U/L 46* 22 23     Results from last 7 days   Lab Units 11/05/22  0428 11/04/22  1209 11/01/22  0434 10/31/22  0429   CALCIUM mg/dL 8.7 8.9 9.0 8.5*   ALBUMIN g/dL  --  2.70* 2.90* 2.70*   MAGNESIUM mg/dL  --  2.0  --   --      Results from last 7 days   Lab Units 11/05/22  0512 11/05/22 0428 11/04/22 2137 11/04/22  1837   LACTATE mmol/L 2.3* 2.4* 2.8* 2.8*     Hemoglobin A1C   Date/Time Value Ref Range Status   11/05/2022 0428 4.60 (L) 4.80 - 5.60 % Final     Glucose   Date/Time Value Ref Range Status   11/06/2022 0616 121 70 - 130 mg/dL Final     Comment:     Meter: [id number] : [id number] Porter Svetlana NA   11/05/2022 2135 164 (H) 70 - 130 mg/dL Final     Comment:     Meter: [id number] : [id number] Prema Montes De Oca NA   11/05/2022 1642 159 (H) 70 - 130 mg/dL Final     Comment:     Meter: [id number] : [id number] Charles Grahamy NA   11/05/2022 1024 125 70 - 130 mg/dL Final     Comment:     Meter: [id number] : [id number] Charles Salinastany NA   11/05/2022 0608 114 70 - 130 mg/dL Final     Comment:     Meter: [id number] : [id number] Brandon GOMEZ       CT Head Without Contrast    Result Date: 11/4/2022   No CT evidence for acute intracranial pathology. The etiology of the patient's confusion is not further elucidated on this examination; and if further assessment is required, one could obtain an MRI of the brain for follow-up.  Radiation dose reduction techniques were utilized, including automated exposure control and exposure modulation based on body size.  This report was finalized on 11/4/2022 1:38 PM by Dr. Buddy Barros M.D.      XR Chest 1 View    Result Date: 11/4/2022  No focal pulmonary consolidation. Follow-up as clinical indications persist.  This report was finalized on  11/4/2022 12:43 PM by Dr. Kedar Olsen M.D.      US Renal Bilateral    Result Date: 11/5/2022  Electronically signed by Warren Devine MD on 11-05-22 at 1859    Scheduled Medications  Cariprazine HCl, 4.5 mg, Oral, Q PM  ertapenem, 500 mg, Intravenous, Q24H  finasteride, 5 mg, Oral, Daily  insulin lispro, 0-9 Units, Subcutaneous, TID With Meals  lactulose, 20 g, Oral, TID  sennosides-docusate, 2 tablet, Oral, Daily  vitamin B-12, 1,000 mcg, Oral, Daily    Infusions  sodium chloride, 75 mL/hr, Last Rate: 75 mL/hr (11/06/22 0501)    Diet  Diet Regular; Cardiac, Low Sodium, Consistent Carbohydrate       Assessment/Plan     Active Hospital Problems    Diagnosis  POA   • **SASCHA (acute kidney injury) (HCC) [N17.9]  Yes   • Cirrhosis (HCC) [K74.60]  Yes   • 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       82 y.o. male admitted with SASCHA (acute kidney injury) (HCC).       I had a long discussion today with 2 family members present at bedside as well as additional daughter over phone.  Patient's family owns an assisted living facility and they are very familiar with end-of-life care.  We discussed that given patient's malignancy and other chronical medical problems, that the chance of meaningful recovery after code would be highly unlikely.  We will update his CODE STATUS at this time to DNR, which the family agreed with stating that they would not want him to undergo code at this time.  I did discuss that given his possible UTI, it is reasonable to continue antibiotics today to see if he has any change in mentation.  They have discussed with me that he has gradually declined over the past couple weeks and has been eating less and less, becoming less interactive.  They understand that he is reaching the end of his life  and would like to focus on his comfort more than anything.  We will transfer over to [facility] at this time and focus mainly on comfort, while continuing antibiotics through today to see if there is any change in his clinical status.  We will also discussed with CCP to attempt arrange transport home, as family would like to have hospice care at home if possible.    Sepsis 2/2 UTI?  Generalized weakness  Mechanical fall  Hypotension, resolved  -WBC 13; lactate 3.6 OA (likely continued elevation in setting of chronic liver disease)  -CTH w/out acute intracranial abnormality  -UA w/ 3+ leuks, numerous WBCs, 3+ bacteria  -started on Ertapenem given prior hx of ESBL UTI    SASCHA  -Crt 3.77 OA (b/l ~0.9) > 2.69  -consult Renal    Cirrhosis  -cont lactulose    DM2  -SSI    HCC  -recently diagnosed via liver bx 11/1/22    TCP  Hyponatremia  -in setting of cirrhosis    Per admission notes family was reportedly interested in palliative care, however when I discussed with the 2 daughters today they indicated they want him to remain full code and are not interested in comfort care at this time.  I do think he would be appropriate given all of his chronic conditions, worsening p.o. status per daughter.  I mention with the daughter we can see how the next couple days ago, though do believe palliative care would be in best interest.    · SCDs for DVT prophylaxis.  · Full code.  · Discussed with patient.  · Anticipate discharge home with family timing yet to be determined.      Warren Dean MD  [facility]  11/06/22  07:34 EST

## 2022-11-06 NOTE — NURSING NOTE
Spoke to Maria Ines from hospice. They will be contacting family to schedule home follow up. Family requested to go home with hospice follow up in progress. They are ok with family doctor covering his PO meds until hospice can arrange comfort care. They will transport pt in their car.DC instructions provided.Lucia (daughter) verbalized understanding.

## 2022-11-06 NOTE — DISCHARGE SUMMARY
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        Brief Admitting HPI      82 year old gentleman who presented with weakness, malaise and confusion;he fell prior to coming in; he was discharged three days ago; he had a nephrostomy tube and suprapubic catheter placed during the admission; he had a biopsy of his liver and family was told that he had cancer but the patient is not aware; the patient is not able to give a good history; family is present at the bedside    Hospital Course     Pt admitted for weakness and malaise and decreased p.o. intake.  He has a known suprapubic catheter and given his mental status as well as infectious appearing urine he was initially started on ertapenem for presumed UTI, given his history of ESBL E. Coli.       I had a long discussion today with 2 family members present at bedside as well as additional daughter over phone.  Patient's family owns an assisted living facility and they are very familiar with end-of-life care.  We discussed that given patient's malignancy and other chronical medical problems, that the chance of meaningful recovery after code would be highly unlikely, and we updated his code status to DNR.  I did discuss that given his possible UTI, it is reasonable to continue antibiotics today to see if he has any change in mentation. They have discussed with me that he has gradually declined  over the past couple weeks and has been eating less and less, becoming less interactive.  They understand that he is reaching the end of his life and would like to focus on his comfort more than anything.  We were initially going to transfer to [facility] and focus mainly on comfort while continuing antibiotics through today to see if there is was any change in clinical status.  However the family decided that they would not like to pursue additional treatments at this time and would just prefer to take him home in order to be comfortable and to be able to interact with extended family.  I believe that this is a good option as the patient is declining and nearing the end of his life.  The family did not request any medications at this time and they plan on transporting home in their car today, with a Home [facility] consult pending.    Discharge Plan     Transition to comfort measures only  [facility] consult at home    Day of Discharge     Subjective:  Patient lethargic this morning.    Very little p.o. intake and not getting out of bed.    Physical Exam:  Temp:  [97.3 °F (36.3 °C)-99 °F (37.2 °C)] 97.3 °F (36.3 °C)  Heart Rate:  [77-86] 77  Resp:  [16-20] 20  BP: (106-125)/(56-63) 116/62  Body mass index is 29.56 kg/m².  Physical Exam   Constitutional:       Appearance: He is obese.      Comments: Groggy; chronically ill-appearing   Cardiovascular:      Rate and Rhythm: Normal rate and regular rhythm.      Heart sounds: No murmur heard.    No gallop.   Pulmonary:      Effort: Pulmonary effort is normal. No respiratory distress.      Breath sounds: Normal breath sounds. No wheezing.   Abdominal:      General: Abdomen is flat. There is no distension.      Palpations: Abdomen is soft.      Tenderness: There is no abdominal tenderness.   Genitourinary:     Comments: Suprapubic catheter  Skin:     General: Skin is warm.      Coloration: Skin is not pale.   Neurological:      General: No focal deficit present.      Mental  Status: He is alert. He is disoriented.     Consultants     Consult Orders (all) (From admission, onward)     Start     Ordered    11/06/22 0845  Inpatient Hospice / [facility] Consult  Once        Specialty:  Hospice and Palliative Medicine  Provider:  (Not yet assigned)    11/06/22 0845    11/05/22 1650  Inpatient Nephrology Consult  Once,   Status:  Canceled        Specialty:  Nephrology  Provider:  Cristiane Abdi MD    11/05/22 1650    11/05/22 1236  Inpatient Nephrology Consult  Once,   Status:  Canceled        Specialty:  Nephrology  Provider:  Jey Jaime MD    11/05/22 1236    11/04/22 2121  Inpatient Palliative Care Team Consult  Once        Provider:  (Not yet assigned)    11/04/22 2120 11/04/22 1959  Inpatient Case Management  Consult  Once        Provider:  (Not yet assigned)    11/04/22 1958 11/04/22 1531  [facility] (on-call MD unless specified) Details  Once        Specialty:  Hospitalist  Provider:  Ping Bradley MD    11/04/22 1533              Procedures     * Surgery not found *      Imaging Results (All)     Procedure Component Value Units Date/Time    US Renal Bilateral [[id number]] Collected: 11/05/22 1859     Updated: 11/05/22 1859    Narrative:        Patient: LUCIANO DE LEON  Time Out: 18:59  Exam(s): US RENAL     EXAM:    US Retroperitoneal Limited, Renal    CLINICAL HISTORY:     Reason for exam: SASCHA.    TECHNIQUE:    Real-time limited ultrasound of the retroperitoneum with image   documentation.    COMPARISON:  CT dated 10 20 2022    FINDINGS:    Right kidney:  Right kidney measures up to 10.7 cm pain cortical cyst   measuring up to 5 mm.  Cortical thinning.  No stone, mass or   hydronephrosis.    Left kidney:  Left kidney measured 11.6 cm.  Echogenic tube seen within   the renal pelvis likely representing nephrostomy tube.  Cortical thinning.    No stone, mass or hydronephrosis.    Bladder:  Yip catheter within the urinary bladder.    IMPRESSION:        1.  Moderate cortical thinning of both kidneys suggesting chronic kidney   disease.  No acute findings.  2.  Left-sided nephrostomy tube.  No hydronephrosis.    Impression:          Electronically signed by Warren Devine MD on 11-05-22 at 1859    CT Head Without Contrast [[id number]] Collected: 11/04/22 1318     Updated: 11/04/22 1341    Narrative:      CT HEAD WITHOUT CONTRAST     CLINICAL HISTORY: Confusion.     TECHNIQUE: CT scan of the head was obtained with 3 mm axial soft tissue  algorithm images. No intravenous contrast was administered. Sagittal and  coronal reconstructions were obtained.     COMPARISON: No previous similar studies are available for comparison.     FINDINGS:     The ventricles, sulci, and cisterns are age appropriate. There are mild  changes of chronic small vessel ischemic phenomena. The basal ganglia  and thalami are unremarkable in appearance. The gray-white matter  differentiation is within normal limits. The posterior fossa structures  are unremarkable.       Impression:         No CT evidence for acute intracranial pathology. The etiology of the  patient's confusion is not further elucidated on this examination; and  if further assessment is required, one could obtain an MRI of the brain  for follow-up.     Radiation dose reduction techniques were utilized, including automated  exposure control and exposure modulation based on body size.     This report was finalized on 11/4/2022 1:38 PM by Dr. Buddy Barros M.D.       XR Chest 1 View [[id number]] Collected: 11/04/22 1242     Updated: 11/04/22 1246    Narrative:      XR CHEST 1 VW-     HISTORY: Male who is 82 years-old,  weakness     TECHNIQUE: Frontal views of the chest     COMPARISON: None available     FINDINGS: Heart, mediastinum and pulmonary vasculature are unremarkable.  No focal pulmonary consolidation, pleural effusion, or pneumothorax. Old  granulomatous disease is apparent. No acute osseous process.       Impression:       No focal pulmonary consolidation. Follow-up as clinical  indications persist.     This report was finalized on 11/4/2022 12:43 PM by Dr. Kedar Olsen M.D.           Results for orders placed during the hospital encounter of 10/20/22    Duplex Portal Hepatic Complete CAR    Interpretation Summary  •  Technically difficult examination due to body habitus, positioning and ascites. The superior mesenteric and right portal veins were not visualized.  •  Echogenicity of the liver consistent with cirrhosis. Borderline dilation of the extrahepatic main portal vein.  •  All other vessels appear normal with normal flow direction and no evidence of thrombus.    Results for orders placed during the hospital encounter of 09/28/21    Adult Transthoracic Echo Complete W/ Cont if Necessary Per Protocol    Interpretation Summary  · Left ventricular ejection fraction appears to be 61 - 65%.  · Left ventricular diastolic function is consistent with (grade I) impaired relaxation.  · No evidence of significant valvular dysfunction    Pertinent Labs     Results from last 7 days   Lab Units 11/05/22  0428 11/04/22  1209 11/01/22  0434 10/31/22  0429   WBC 10*3/mm3 12.01* 13.13* 6.38 7.24   HEMOGLOBIN g/dL 11.0* 10.7* 11.5* 11.0*   PLATELETS 10*3/mm3 107* 104* 121* 108*     Results from last 7 days   Lab Units 11/05/22  0428 11/04/22  1209 11/01/22  0434 10/31/22  0429   SODIUM mmol/L 130* 129* 133* 132*   POTASSIUM mmol/L 4.8 5.3* 3.8 3.7   CHLORIDE mmol/L 95* 94* 98 99   CO2 mmol/L 22.0 23.3 24.8 24.1   BUN mg/dL 31* 29* 9 8   CREATININE mg/dL 2.69* 3.77* 0.78 0.80   GLUCOSE mg/dL 127* 133* 225* 139*   EGFR mL/min/1.73 23.1* 15.4* 89.6 88.9     Results from last 7 days   Lab Units 11/04/22  1209 11/01/22  0434 10/31/22  0429   ALBUMIN g/dL 2.70* 2.90* 2.70*   BILIRUBIN mg/dL 3.8* 2.6* 3.1*   ALK PHOS U/L 182* 118* 110   AST (SGOT) U/L 106* 53* 45*   ALT (SGPT) U/L 46* 22 23     Results from last 7 days   Lab Units  11/05/22  0428 11/04/22  1209 11/01/22  0434 10/31/22  0429   CALCIUM mg/dL 8.7 8.9 9.0 8.5*   ALBUMIN g/dL  --  2.70* 2.90* 2.70*   MAGNESIUM mg/dL  --  2.0  --   --      Results from last 7 days   Lab Units 11/04/22  1417   AMMONIA umol/L 42     Results from last 7 days   Lab Units 11/04/22  1209   TROPONIN T ng/mL 0.103*           Invalid input(s): LDLCALC  Results from last 7 days   Lab Units 11/04/22  1622 11/04/22  1551 11/04/22  1355   BLOODCX  No growth at 24 hours No growth at 24 hours  --    URINECX   --   --  >100,000 CFU/mL Gram Negative Bacilli*     Results from last 7 days   Lab Units 11/01/22  1310   COVID19  Not Detected       Test Results Pending at Discharge     Pending Labs     Order Current Status    Blood Culture - Blood, Arm, Left Preliminary result    Blood Culture - Blood, Arm, Right Preliminary result    Urine Culture - Urine, Urine, Catheter Preliminary result          Discharge Details        Discharge Medications      Continue These Medications      Instructions Start Date   Cariprazine HCl 1.5 MG capsule capsule  Commonly known as: VRAYLAR   4.5 mg, Oral, Every Evening      finasteride 5 MG tablet  Commonly known as: PROSCAR   5 mg, Oral, Daily      furosemide 20 MG tablet  Commonly known as: LASIX   60 mg, Oral, Daily      lactulose 10 GM/15ML solution  Commonly known as: CHRONULAC   20 g, Oral, 3 Times Daily      meloxicam 7.5 MG tablet  Commonly known as: MOBIC   7.5 mg, Oral, Daily, TO HOLD 1 WEEK BEFORE SURGERY      metFORMIN 500 MG tablet  Commonly known as: GLUCOPHAGE   500 mg, Oral, 2 Times Daily With Meals      oxyCODONE-acetaminophen 7.5-325 MG per tablet  Commonly known as: PERCOCET   1 tablet, Oral, Every 4 Hours PRN      pioglitazone 30 MG tablet  Commonly known as: ACTOS   30 mg, Oral, Daily      potassium chloride 10 MEQ CR tablet   10 mEq, Oral, Daily      sennosides-docusate 8.6-50 MG per tablet  Commonly known as: PERICOLACE   2 tablets, Oral, Daily      spironolactone  100 MG tablet  Commonly known as: ALDACTONE   100 mg, Oral, Daily      vitamin B-12 500 MCG tablet  Commonly known as: CYANOCOBALAMIN   1,000 mcg, Oral, Daily         Stop These Medications    TRESIBA FLEXTOUCH SC            Allergies   Allergen Reactions   • Morphine Itching and Confusion   • Penicillins Unknown - Low Severity     REACTION UNKNOWN; patient reports no allergy to pcn.         Discharge Disposition:  Home or Self Care      Discharge Diet:  Diet Order   Procedures   • Diet Regular; Cardiac, Low Sodium, Consistent Carbohydrate       Discharge Activity:   Activity Instructions     Activity as Tolerated            CODE STATUS:    Code Status and Medical Interventions:   Ordered at: 11/06/22 0847     Medical Intervention Limits:    NO intubation (DNI)     Code Status (Patient has no pulse and is not breathing):    No CPR (Do Not Attempt to Resuscitate)     Medical Interventions (Patient has pulse or is breathing):    Limited Support     Release to patient:    Routine Release       Future Appointments   Date Time Provider Department Center   11/21/2022  9:50 AM LAB CHAIR 5 MELINDA ANGULO  [facility]   11/21/2022 10:20 AM Cony Lancaster MD PhD [facility]      Follow-up Information     Cam Dickey MD .    Specialty: Family Medicine  Contact information:  [address] [place] [address] [phone]                         Time Spent on Discharge:  Greater than 30 minutes      Warren Dean MD  [facility]  11/06/22  12:43 EST

## 2022-11-06 NOTE — PROGRESS NOTES
Nephrology    Chart reviewed.    Family has decided to focus on comfort.    Arrangements being made to transfer patient home under the care of [facility]. Will therefore not see.  Thank you for the consideration of the consult, though.    --Antoine Chambers MD

## 2022-11-06 NOTE — PLAN OF CARE
Goal Outcome Evaluation:  Plan of Care Reviewed With: patient        Progress: improving  Outcome Evaluation: Pt alert except to situation; IVF and antibiotics continue; Neph tube with moderate output; Nephrology consulted; PRN pain med given x1; Will continue to monitor

## 2022-11-06 NOTE — CONSULTS
Chart reviewed, patient is being discharged home today with family. [facility] consult was called by this RN who will reach out to family to schedule meeting time.

## 2022-11-07 ENCOUNTER — READMISSION MANAGEMENT (OUTPATIENT)
Dept: CALL CENTER | Facility: HOSPITAL | Age: 82
End: 2022-11-07

## 2022-11-07 NOTE — OUTREACH NOTE
Prep Survey    Flowsheet Row Responses   [facility] patient discharged from? [facility]   Is LACE score < 7 ? No   Emergency Room discharge w/ pulse ox? No   Eligibility Readm Mgmt   Discharge diagnosis SASCHA   Does the patient have one of the following disease processes/diagnoses(primary or secondary)? Other   Does the patient have Home health ordered? No   Is there a DME ordered? No   Medication alerts for this patient see AVS   Prep survey completed? Yes          THOMAS YU - Registered Nurse

## 2022-11-08 LAB — BACTERIA SPEC AEROBE CULT: ABNORMAL

## 2022-11-09 ENCOUNTER — READMISSION MANAGEMENT (OUTPATIENT)
Dept: CALL CENTER | Facility: HOSPITAL | Age: 82
End: 2022-11-09

## 2022-11-09 LAB
BACTERIA SPEC AEROBE CULT: NORMAL
BACTERIA SPEC AEROBE CULT: NORMAL

## 2022-11-09 NOTE — OUTREACH NOTE
Medical Week 1 Survey    Flowsheet Row Responses   [facility] patient discharged from? [place]   Does the patient have one of the following disease processes/diagnoses(primary or secondary)? Other   Week 1 attempt successful? No   Unsuccessful attempts Attempt 1          ALTAF AVENDAÑO - Licensed Nurse

## 2022-11-14 ENCOUNTER — LAB (OUTPATIENT)
Dept: LAB | Facility: HOSPITAL | Age: 82
End: 2022-11-14

## 2022-11-16 ENCOUNTER — READMISSION MANAGEMENT (OUTPATIENT)
Dept: CALL CENTER | Facility: HOSPITAL | Age: 82
End: 2022-11-16

## 2022-11-16 NOTE — OUTREACH NOTE
Medical Week 2 Survey    Flowsheet Row Responses   [facility] patient discharged from? [place]   Does the patient have one of the following disease processes/diagnoses(primary or secondary)? Other   Week 2 attempt successful? No   Unsuccessful attempts Attempt 1          ANSHUL RIVERS - Registered Nurse

## 2022-11-21 ENCOUNTER — APPOINTMENT (OUTPATIENT)
Dept: LAB | Facility: HOSPITAL | Age: 82
End: 2022-11-21

## 2022-11-23 ENCOUNTER — READMISSION MANAGEMENT (OUTPATIENT)
Dept: CALL CENTER | Facility: HOSPITAL | Age: 82
End: 2022-11-23

## 2022-11-23 NOTE — OUTREACH NOTE
Medical Week 3 Survey    Flowsheet Row Responses   [facility] patient discharged from? [place]   Does the patient have one of the following disease processes/diagnoses(primary or secondary)? Other   Week 3 attempt successful? Yes   Call start time 1549   Revoke Change in health status-moved to LTC/SNF/Hospice  [Daughter states Hospice has been seeing pt ]   Discharge diagnosis SASCHA   Is patient permission given to speak with other caregiver? Yes   List who call center can speak with Daughter  Kasey    Person spoke with today (if not patient) and relationship Gurpreet SCOTT - Registered Nurse

## 2023-05-29 NOTE — CASE MANAGEMENT/SOCIAL WORK
Case Management Discharge Note      Final Note: Discharged to assisted living. ORQUIDEA AVALOS CCP    Provided Post Acute Provider List?: N/A  N/A Provider List Comment: Patient has requested a referral to [facility]  Provided Post Acute Provider Quality & Resource List?: N/A  N/A Quality & Resource List Comment: Patient has requested a referral to [facility]    Selected Continued Care - Discharged on 10/7/2022 Admission date: 9/30/2022 - Discharge disposition: Home or Self Care    Destination    No services have been selected for the patient.              Durable Medical Equipment    No services have been selected for the patient.              Dialysis/Infusion    No services have been selected for the patient.              Home Medical Care    No services have been selected for the patient.              Therapy    No services have been selected for the patient.              Community Resources    No services have been selected for the patient.              Community & DME    No services have been selected for the patient.                  Transportation Services  Private: Car    Final Discharge Disposition Code: 01 - home or self-care   Self

## (undated) DEVICE — WIPE INST MEROCEL

## (undated) DEVICE — SUT ETHLN 2/0 PS 18IN 585H

## (undated) DEVICE — MAT FLR ABSORBENT LG 4FT 10 2.5FT

## (undated) DEVICE — IRRIGATOR BULB ASEPTO 60CC STRL

## (undated) DEVICE — COLLECTION SOCK, GENERAL: Brand: DEROYAL

## (undated) DEVICE — HDRST POSITIONING FM RND 2X9IN

## (undated) DEVICE — SUT PDS 1 XLH LP 99IN Z881G

## (undated) DEVICE — MAGNETIC DRAPE: Brand: DEVON

## (undated) DEVICE — JELLY,LUBE,STERILE,FLIP TOP,TUBE,4-OZ: Brand: MEDLINE

## (undated) DEVICE — SPNG LAP 18X18IN LF STRL PK/5

## (undated) DEVICE — CATHETER,FOLEY,SILI-ELAST,LTX,24FR,30ML: Brand: MEDLINE

## (undated) DEVICE — SUT SILK 3/0 FS1 18IN 684G

## (undated) DEVICE — SUT SILK 0 TIES 30IN A306H

## (undated) DEVICE — ELECTRD SUPERSECT FRNT LOAD 5PK

## (undated) DEVICE — LOU TUR: Brand: MEDLINE INDUSTRIES, INC.

## (undated) DEVICE — HYDRODEBRIDER 1914001 STANDARD HANDPIECE: Brand: HYDRODEBRIDER®

## (undated) DEVICE — GLV SURG BIOGEL LTX PF 7

## (undated) DEVICE — SUT GUT CHRM 3/0 SH 27IN G122H

## (undated) DEVICE — INTENDED FOR TISSUE SEPARATION, AND OTHER PROCEDURES THAT REQUIRE A SHARP SURGICAL BLADE TO PUNCTURE OR CUT.: Brand: BARD-PARKER ® CARBON RIB-BACK BLADES

## (undated) DEVICE — TOWEL,OR,DSP,ST,BLUE,STD,4/PK,20PK/CS: Brand: MEDLINE

## (undated) DEVICE — DRAINBAG,ANTI-REFLUX TOWER,L/F,2000ML,LL: Brand: MEDLINE

## (undated) DEVICE — GLV SURG TRIUMPH CLASSIC PF LTX 7.5 STRL

## (undated) DEVICE — NDL HYPO PRECISIONGLIDE REG 25G 1 1/2

## (undated) DEVICE — CONTAINER,SPECIMEN,OR STERILE,4OZ: Brand: MEDLINE

## (undated) DEVICE — GLV SURG TRIUMPH CLASSIC PF LTX 6.5 STRL

## (undated) DEVICE — TBG PENCL TELESCP MEGADYNE SMOKE EVAC 10FT

## (undated) DEVICE — TUBING, SUCTION, 1/4" X 20', STRAIGHT: Brand: MEDLINE INDUSTRIES, INC.

## (undated) DEVICE — SYR LL TP 10ML STRL

## (undated) DEVICE — SUT GUT CHRM 4/0 P3 18IN 1654G

## (undated) DEVICE — LIGACLIP MCA MULTIPLE CLIP APPLIERS, 20 LARGE CLIPS
Type: IMPLANTABLE DEVICE | Site: PELVIS | Status: NON-FUNCTIONAL
Brand: LIGACLIP

## (undated) DEVICE — APPL CHLORAPREP HI/LITE 26ML ORNG

## (undated) DEVICE — CATH URETRL FLXITP POLLACK STD 5F 70CM

## (undated) DEVICE — MEDI-VAC YANKAUER SUCTION HANDLE W/BULBOUS TIP: Brand: CARDINAL HEALTH

## (undated) DEVICE — 3M™ STERI-STRIP™ REINFORCED ADHESIVE SKIN CLOSURES, R1547, 1/2 IN X 4 IN (12 MM X 100 MM), 6 STRIPS/ENVELOPE: Brand: 3M™ STERI-STRIP™

## (undated) DEVICE — PACKING 400407 20PK PAIR BARON SINUS-PAK: Brand: MEROCEL®

## (undated) DEVICE — POOLE SUCTION HANDLE: Brand: CARDINAL HEALTH

## (undated) DEVICE — LIGACLIP MCA MULTIPLE CLIP APPLIERS, 20 MEDIUM CLIPS
Type: IMPLANTABLE DEVICE | Site: PELVIS | Status: NON-FUNCTIONAL
Brand: LIGACLIP

## (undated) DEVICE — DRESSING 440402 MEROCEL 10PK STD 8CM: Brand: MEROCEL

## (undated) DEVICE — SPNG GZ WOVN 4X4IN 12PLY 10/BX STRL

## (undated) DEVICE — BAG,DRAINAGE,4L,A/R TOWER,LL,SLIDE TAP: Brand: MEDLINE

## (undated) DEVICE — PK ENT FESS 40

## (undated) DEVICE — SPNG DISECTOR KTNER XRAY COTN 1/4X9/16IN PK/5

## (undated) DEVICE — PK PROC MAJ 40

## (undated) DEVICE — ANTIBACTERIAL UNDYED BRAIDED (POLYGLACTIN 910), SYNTHETIC ABSORBABLE SUTURE: Brand: COATED VICRYL

## (undated) DEVICE — TIDISHIELD UROLOGY DRAIN BAGS FROSTY VINYL STERILE FITS SIEMENS UROSKOP ACCESS 20 PER CASE: Brand: TIDISHIELD

## (undated) DEVICE — CATH COUVALAIRE SIMPLASTIC 3WY 24F 30CC

## (undated) DEVICE — SUT GUT CHRM 2/0 SH 27IN G123H

## (undated) DEVICE — GOWN,NON-REINFORCED,SIRUS,SET IN SLV,XL: Brand: MEDLINE

## (undated) DEVICE — SUT SILK 2/0 SH CR5 18IN C0125

## (undated) DEVICE — STPLR SKIN VISISTAT WD 35CT

## (undated) DEVICE — BLADE 1884004 TRICUT 5PK 4MM: Brand: TRICUT®

## (undated) DEVICE — DRSNG WND BORDR/ADHS NONADHR/GZ LF 4X14IN STRL

## (undated) DEVICE — DRSNG WND GZ PAD BORDERED 4X8IN STRL

## (undated) DEVICE — NITINOL WIRE WITH HYDROPHILIC TIP: Brand: SENSOR

## (undated) DEVICE — CATH IV INSYTE AUTOGARD 14G 1 1/2IN ORNG

## (undated) DEVICE — TOTAL TRAY, 16FR 10ML SIL FOLEY, URN: Brand: MEDLINE

## (undated) DEVICE — COVER,MAYO STAND,STERILE: Brand: MEDLINE

## (undated) DEVICE — CODMAN® SURGICAL PATTIES 1/2" X 3" (1.27CM X 7.62CM): Brand: CODMAN®

## (undated) DEVICE — 1071 S-DRP URO STLE-GAMA 10/BX,4X/C: Brand: STERI-DRAPE™

## (undated) DEVICE — TRAP,MUCUS SPECIMEN, 80CC: Brand: MEDLINE

## (undated) DEVICE — BLADE 1884006 RAD40 5PK SINUS 4MM: Brand: RAD®

## (undated) DEVICE — ANTI-FOG SOLUTION WITH FOAM PAD: Brand: DEVON

## (undated) DEVICE — SUT SILK 2/0 FS BLK 18IN 685G

## (undated) DEVICE — SYRINGE,TOOMEY,IRRIGATION,70CC,STERILE: Brand: MEDLINE

## (undated) DEVICE — SUT SILK 3/0 FS1 18IN 684H